# Patient Record
Sex: MALE | Race: WHITE | NOT HISPANIC OR LATINO | Employment: OTHER | URBAN - METROPOLITAN AREA
[De-identification: names, ages, dates, MRNs, and addresses within clinical notes are randomized per-mention and may not be internally consistent; named-entity substitution may affect disease eponyms.]

---

## 2017-01-19 ENCOUNTER — ALLSCRIPTS OFFICE VISIT (OUTPATIENT)
Dept: OTHER | Facility: OTHER | Age: 72
End: 2017-01-19

## 2017-03-03 ENCOUNTER — ALLSCRIPTS OFFICE VISIT (OUTPATIENT)
Dept: FAMILY MEDICINE CLINIC | Facility: CLINIC | Age: 72
End: 2017-03-03
Payer: COMMERCIAL

## 2017-03-03 ENCOUNTER — APPOINTMENT (OUTPATIENT)
Dept: LAB | Facility: HOSPITAL | Age: 72
End: 2017-03-03
Payer: COMMERCIAL

## 2017-03-03 DIAGNOSIS — E55.9 VITAMIN D DEFICIENCY: ICD-10-CM

## 2017-03-03 DIAGNOSIS — I51.9 HEART DISEASE: ICD-10-CM

## 2017-03-03 DIAGNOSIS — R94.6 ABNORMAL RESULTS OF THYROID FUNCTION STUDIES: ICD-10-CM

## 2017-03-03 DIAGNOSIS — R19.09 OTHER INTRA-ABDOMINAL AND PELVIC SWELLING, MASS AND LUMP: ICD-10-CM

## 2017-03-03 DIAGNOSIS — D64.9 ANEMIA: ICD-10-CM

## 2017-03-03 DIAGNOSIS — E53.8 DEFICIENCY OF OTHER SPECIFIED B GROUP VITAMINS: ICD-10-CM

## 2017-03-03 LAB
25(OH)D3 SERPL-MCNC: 5.8 NG/ML (ref 30–100)
ALBUMIN SERPL BCP-MCNC: 3 G/DL (ref 3.5–5)
ALP SERPL-CCNC: 93 U/L (ref 46–116)
ALT SERPL W P-5'-P-CCNC: 14 U/L (ref 12–78)
ANION GAP SERPL CALCULATED.3IONS-SCNC: 6 MMOL/L (ref 4–13)
AST SERPL W P-5'-P-CCNC: 12 U/L (ref 5–45)
BILIRUB SERPL-MCNC: 0.27 MG/DL (ref 0.2–1)
BUN SERPL-MCNC: 11 MG/DL (ref 5–25)
CALCIUM SERPL-MCNC: 9.1 MG/DL (ref 8.3–10.1)
CHLORIDE SERPL-SCNC: 103 MMOL/L (ref 100–108)
CHOLEST SERPL-MCNC: 182 MG/DL (ref 50–200)
CO2 SERPL-SCNC: 32 MMOL/L (ref 21–32)
CREAT SERPL-MCNC: 0.77 MG/DL (ref 0.6–1.3)
GFR SERPL CREATININE-BSD FRML MDRD: >60 ML/MIN/1.73SQ M
GLUCOSE SERPL-MCNC: 88 MG/DL (ref 65–140)
HDLC SERPL-MCNC: 36 MG/DL (ref 40–60)
LDLC SERPL CALC-MCNC: 107 MG/DL (ref 0–100)
POTASSIUM SERPL-SCNC: 4.2 MMOL/L (ref 3.5–5.3)
PROT SERPL-MCNC: 6.9 G/DL (ref 6.4–8.2)
SODIUM SERPL-SCNC: 141 MMOL/L (ref 136–145)
TRIGL SERPL-MCNC: 197 MG/DL
TSH SERPL DL<=0.05 MIU/L-ACNC: 0.93 UIU/ML (ref 0.36–3.74)
VIT B12 SERPL-MCNC: 407 PG/ML (ref 100–900)

## 2017-03-03 PROCEDURE — 82607 VITAMIN B-12: CPT

## 2017-03-03 PROCEDURE — 80061 LIPID PANEL: CPT

## 2017-03-03 PROCEDURE — 80053 COMPREHEN METABOLIC PANEL: CPT

## 2017-03-03 PROCEDURE — T1015 CLINIC SERVICE: HCPCS | Performed by: PHYSICIAN ASSISTANT

## 2017-03-03 PROCEDURE — 82306 VITAMIN D 25 HYDROXY: CPT

## 2017-03-03 PROCEDURE — 84443 ASSAY THYROID STIM HORMONE: CPT

## 2017-03-03 PROCEDURE — 36415 COLL VENOUS BLD VENIPUNCTURE: CPT

## 2017-03-20 ENCOUNTER — ALLSCRIPTS OFFICE VISIT (OUTPATIENT)
Dept: OTHER | Facility: OTHER | Age: 72
End: 2017-03-20

## 2017-05-08 ENCOUNTER — APPOINTMENT (INPATIENT)
Dept: RADIOLOGY | Facility: HOSPITAL | Age: 72
DRG: 189 | End: 2017-05-08
Payer: MEDICARE

## 2017-05-08 ENCOUNTER — HOSPITAL ENCOUNTER (INPATIENT)
Facility: HOSPITAL | Age: 72
LOS: 7 days | Discharge: HOME | DRG: 189 | End: 2017-05-15
Attending: INTERNAL MEDICINE | Admitting: INTERNAL MEDICINE
Payer: MEDICARE

## 2017-05-08 ENCOUNTER — ALLSCRIPTS OFFICE VISIT (OUTPATIENT)
Dept: OTHER | Facility: OTHER | Age: 72
End: 2017-05-08

## 2017-05-08 DIAGNOSIS — J44.1 CHRONIC OBSTRUCTIVE PULMONARY DISEASE WITH ACUTE EXACERBATION (HCC): Primary | ICD-10-CM

## 2017-05-08 LAB
ALBUMIN SERPL BCP-MCNC: 3.1 G/DL (ref 3.5–5)
ALP SERPL-CCNC: 84 U/L (ref 46–116)
ALT SERPL W P-5'-P-CCNC: 16 U/L (ref 12–78)
ANION GAP SERPL CALCULATED.3IONS-SCNC: 5 MMOL/L (ref 4–13)
ARTERIAL PATENCY WRIST A: YES
AST SERPL W P-5'-P-CCNC: 13 U/L (ref 5–45)
BASE EXCESS BLDA CALC-SCNC: 4.9 MMOL/L
BASOPHILS # BLD AUTO: 0.03 THOUSANDS/ΜL (ref 0–0.1)
BASOPHILS NFR BLD AUTO: 1 % (ref 0–1)
BILIRUB SERPL-MCNC: 0.6 MG/DL (ref 0.2–1)
BUN SERPL-MCNC: 11 MG/DL (ref 5–25)
CALCIUM SERPL-MCNC: 8.4 MG/DL (ref 8.3–10.1)
CHLORIDE SERPL-SCNC: 106 MMOL/L (ref 100–108)
CO2 SERPL-SCNC: 32 MMOL/L (ref 21–32)
CREAT SERPL-MCNC: 0.85 MG/DL (ref 0.6–1.3)
EOSINOPHIL # BLD AUTO: 0.24 THOUSAND/ΜL (ref 0–0.61)
EOSINOPHIL NFR BLD AUTO: 4 % (ref 0–6)
ERYTHROCYTE [DISTWIDTH] IN BLOOD BY AUTOMATED COUNT: 13.8 % (ref 11.6–15.1)
GFR SERPL CREATININE-BSD FRML MDRD: >60 ML/MIN/1.73SQ M
GLUCOSE SERPL-MCNC: 86 MG/DL (ref 65–140)
HCO3 BLDA-SCNC: 30.2 MMOL/L (ref 22–28)
HCT VFR BLD AUTO: 42.4 % (ref 36.5–49.3)
HGB BLD-MCNC: 13.6 G/DL (ref 12–17)
LYMPHOCYTES # BLD AUTO: 1.35 THOUSANDS/ΜL (ref 0.6–4.47)
LYMPHOCYTES NFR BLD AUTO: 20 % (ref 14–44)
MAGNESIUM SERPL-MCNC: 2 MG/DL (ref 1.6–2.6)
MCH RBC QN AUTO: 30.9 PG (ref 26.8–34.3)
MCHC RBC AUTO-ENTMCNC: 32.1 G/DL (ref 31.4–37.4)
MCV RBC AUTO: 96 FL (ref 82–98)
MONOCYTES # BLD AUTO: 0.67 THOUSAND/ΜL (ref 0.17–1.22)
MONOCYTES NFR BLD AUTO: 10 % (ref 4–12)
NEUTROPHILS # BLD AUTO: 4.34 THOUSANDS/ΜL (ref 1.85–7.62)
NEUTS SEG NFR BLD AUTO: 65 % (ref 43–75)
NON VENT ROOM AIR: ABNORMAL %
O2 CT BLDA-SCNC: 17.9 ML/DL (ref 16–23)
OXYHGB MFR BLDA: 90.4 % (ref 94–97)
PCO2 BLDA: 46.8 MM HG (ref 36–44)
PH BLDA: 7.43 [PH] (ref 7.35–7.45)
PHOSPHATE SERPL-MCNC: 3.1 MG/DL (ref 2.3–4.1)
PLATELET # BLD AUTO: 223 THOUSANDS/UL (ref 149–390)
PMV BLD AUTO: 9.7 FL (ref 8.9–12.7)
PO2 BLDA: 61.2 MM HG (ref 75–129)
POTASSIUM SERPL-SCNC: 4.1 MMOL/L (ref 3.5–5.3)
PROT SERPL-MCNC: 6.7 G/DL (ref 6.4–8.2)
RBC # BLD AUTO: 4.4 MILLION/UL (ref 3.88–5.62)
SODIUM SERPL-SCNC: 143 MMOL/L (ref 136–145)
SPECIMEN SOURCE: ABNORMAL
TROPONIN I SERPL-MCNC: <0.02 NG/ML
WBC # BLD AUTO: 6.63 THOUSAND/UL (ref 4.31–10.16)

## 2017-05-08 PROCEDURE — 82805 BLOOD GASES W/O2 SATURATION: CPT | Performed by: INTERNAL MEDICINE

## 2017-05-08 PROCEDURE — 85025 COMPLETE CBC W/AUTO DIFF WBC: CPT | Performed by: PHYSICIAN ASSISTANT

## 2017-05-08 PROCEDURE — 71010 HB CHEST X-RAY 1 VIEW FRONTAL (PORTABLE): CPT

## 2017-05-08 PROCEDURE — 84484 ASSAY OF TROPONIN QUANT: CPT | Performed by: PHYSICIAN ASSISTANT

## 2017-05-08 PROCEDURE — 94760 N-INVAS EAR/PLS OXIMETRY 1: CPT

## 2017-05-08 PROCEDURE — 36600 WITHDRAWAL OF ARTERIAL BLOOD: CPT

## 2017-05-08 PROCEDURE — 84100 ASSAY OF PHOSPHORUS: CPT | Performed by: INTERNAL MEDICINE

## 2017-05-08 PROCEDURE — 94640 AIRWAY INHALATION TREATMENT: CPT

## 2017-05-08 PROCEDURE — 94664 DEMO&/EVAL PT USE INHALER: CPT

## 2017-05-08 PROCEDURE — 87798 DETECT AGENT NOS DNA AMP: CPT | Performed by: PHYSICIAN ASSISTANT

## 2017-05-08 PROCEDURE — 93005 ELECTROCARDIOGRAM TRACING: CPT | Performed by: PHYSICIAN ASSISTANT

## 2017-05-08 PROCEDURE — 80053 COMPREHEN METABOLIC PANEL: CPT | Performed by: PHYSICIAN ASSISTANT

## 2017-05-08 PROCEDURE — 83735 ASSAY OF MAGNESIUM: CPT | Performed by: PHYSICIAN ASSISTANT

## 2017-05-08 RX ORDER — GUAIFENESIN/DEXTROMETHORPHAN 100-10MG/5
10 SYRUP ORAL EVERY 4 HOURS PRN
Status: DISCONTINUED | OUTPATIENT
Start: 2017-05-08 | End: 2017-05-15 | Stop reason: HOSPADM

## 2017-05-08 RX ORDER — NICOTINE 21 MG/24HR
1 PATCH, TRANSDERMAL 24 HOURS TRANSDERMAL DAILY
Status: DISCONTINUED | OUTPATIENT
Start: 2017-05-08 | End: 2017-05-08

## 2017-05-08 RX ORDER — LEVALBUTEROL 1.25 MG/.5ML
1.25 SOLUTION, CONCENTRATE RESPIRATORY (INHALATION) EVERY 6 HOURS PRN
Status: DISCONTINUED | OUTPATIENT
Start: 2017-05-08 | End: 2017-05-15 | Stop reason: HOSPADM

## 2017-05-08 RX ORDER — METHYLPREDNISOLONE SODIUM SUCCINATE 40 MG/ML
40 INJECTION, POWDER, LYOPHILIZED, FOR SOLUTION INTRAMUSCULAR; INTRAVENOUS EVERY 8 HOURS
Status: DISCONTINUED | OUTPATIENT
Start: 2017-05-08 | End: 2017-05-11

## 2017-05-08 RX ORDER — NICOTINE 21 MG/24HR
21 PATCH, TRANSDERMAL 24 HOURS TRANSDERMAL DAILY
Status: DISCONTINUED | OUTPATIENT
Start: 2017-05-08 | End: 2017-05-15 | Stop reason: HOSPADM

## 2017-05-08 RX ORDER — LEVALBUTEROL 1.25 MG/.5ML
1.25 SOLUTION, CONCENTRATE RESPIRATORY (INHALATION)
Status: DISCONTINUED | OUTPATIENT
Start: 2017-05-08 | End: 2017-05-15 | Stop reason: HOSPADM

## 2017-05-08 RX ORDER — SODIUM CHLORIDE FOR INHALATION 0.9 %
3 VIAL, NEBULIZER (ML) INHALATION EVERY 6 HOURS PRN
Status: DISCONTINUED | OUTPATIENT
Start: 2017-05-08 | End: 2017-05-15 | Stop reason: HOSPADM

## 2017-05-08 RX ORDER — ATORVASTATIN CALCIUM 40 MG/1
80 TABLET, FILM COATED ORAL
Status: DISCONTINUED | OUTPATIENT
Start: 2017-05-08 | End: 2017-05-15 | Stop reason: HOSPADM

## 2017-05-08 RX ORDER — ASPIRIN 81 MG/1
81 TABLET, CHEWABLE ORAL DAILY
Status: DISCONTINUED | OUTPATIENT
Start: 2017-05-08 | End: 2017-05-15 | Stop reason: HOSPADM

## 2017-05-08 RX ORDER — FERROUS SULFATE 325(65) MG
325 TABLET ORAL
Status: DISCONTINUED | OUTPATIENT
Start: 2017-05-09 | End: 2017-05-15 | Stop reason: HOSPADM

## 2017-05-08 RX ADMIN — ASPIRIN 81 MG: 81 TABLET, CHEWABLE ORAL at 19:03

## 2017-05-08 RX ADMIN — ATORVASTATIN CALCIUM 80 MG: 40 TABLET, FILM COATED ORAL at 19:03

## 2017-05-08 RX ADMIN — THEOPHYLLINE ANHYDROUS 400 MG: 400 CAPSULE, EXTENDED RELEASE ORAL at 19:03

## 2017-05-08 RX ADMIN — LEVALBUTEROL 1.25 MG: 1.25 SOLUTION, CONCENTRATE RESPIRATORY (INHALATION) at 19:44

## 2017-05-08 RX ADMIN — IPRATROPIUM BROMIDE 0.5 MG: 0.5 SOLUTION RESPIRATORY (INHALATION) at 19:44

## 2017-05-08 RX ADMIN — NICOTINE 21 MG: 21 PATCH, EXTENDED RELEASE TRANSDERMAL at 19:03

## 2017-05-08 RX ADMIN — ENOXAPARIN SODIUM 40 MG: 40 INJECTION SUBCUTANEOUS at 19:02

## 2017-05-08 RX ADMIN — METHYLPREDNISOLONE SODIUM SUCCINATE 40 MG: 40 INJECTION, POWDER, FOR SOLUTION INTRAMUSCULAR; INTRAVENOUS at 19:03

## 2017-05-09 ENCOUNTER — APPOINTMENT (INPATIENT)
Dept: OCCUPATIONAL THERAPY | Facility: HOSPITAL | Age: 72
DRG: 189 | End: 2017-05-09
Payer: MEDICARE

## 2017-05-09 LAB
25(OH)D3 SERPL-MCNC: 8.8 NG/ML (ref 30–100)
ANION GAP SERPL CALCULATED.3IONS-SCNC: 8 MMOL/L (ref 4–13)
ATRIAL RATE: 89 BPM
BUN SERPL-MCNC: 14 MG/DL (ref 5–25)
CALCIUM SERPL-MCNC: 8.8 MG/DL (ref 8.3–10.1)
CHLORIDE SERPL-SCNC: 105 MMOL/L (ref 100–108)
CO2 SERPL-SCNC: 28 MMOL/L (ref 21–32)
CREAT SERPL-MCNC: 0.84 MG/DL (ref 0.6–1.3)
ERYTHROCYTE [DISTWIDTH] IN BLOOD BY AUTOMATED COUNT: 13.3 % (ref 11.6–15.1)
FLUAV AG SPEC QL: NORMAL
FLUBV AG SPEC QL: NORMAL
GFR SERPL CREATININE-BSD FRML MDRD: >60 ML/MIN/1.73SQ M
GLUCOSE SERPL-MCNC: 144 MG/DL (ref 65–140)
HCT VFR BLD AUTO: 44 % (ref 36.5–49.3)
HGB BLD-MCNC: 14.3 G/DL (ref 12–17)
MCH RBC QN AUTO: 30.8 PG (ref 26.8–34.3)
MCHC RBC AUTO-ENTMCNC: 32.5 G/DL (ref 31.4–37.4)
MCV RBC AUTO: 95 FL (ref 82–98)
P AXIS: 81 DEGREES
PLATELET # BLD AUTO: 224 THOUSANDS/UL (ref 149–390)
PMV BLD AUTO: 9.9 FL (ref 8.9–12.7)
POTASSIUM SERPL-SCNC: 4.1 MMOL/L (ref 3.5–5.3)
PR INTERVAL: 168 MS
QRS AXIS: -20 DEGREES
QRSD INTERVAL: 76 MS
QT INTERVAL: 356 MS
QTC INTERVAL: 433 MS
RBC # BLD AUTO: 4.64 MILLION/UL (ref 3.88–5.62)
RSV B RNA SPEC QL NAA+PROBE: NORMAL
SODIUM SERPL-SCNC: 141 MMOL/L (ref 136–145)
T WAVE AXIS: 47 DEGREES
THEOPHYLLINE SERPL-MCNC: 4.9 UG/ML (ref 10–20)
VENTRICULAR RATE: 89 BPM
WBC # BLD AUTO: 4.27 THOUSAND/UL (ref 4.31–10.16)

## 2017-05-09 PROCEDURE — 94640 AIRWAY INHALATION TREATMENT: CPT

## 2017-05-09 PROCEDURE — G8988 SELF CARE GOAL STATUS: HCPCS

## 2017-05-09 PROCEDURE — G8987 SELF CARE CURRENT STATUS: HCPCS

## 2017-05-09 PROCEDURE — 82306 VITAMIN D 25 HYDROXY: CPT | Performed by: PHYSICIAN ASSISTANT

## 2017-05-09 PROCEDURE — 85027 COMPLETE CBC AUTOMATED: CPT | Performed by: PHYSICIAN ASSISTANT

## 2017-05-09 PROCEDURE — 80048 BASIC METABOLIC PNL TOTAL CA: CPT | Performed by: PHYSICIAN ASSISTANT

## 2017-05-09 PROCEDURE — 94760 N-INVAS EAR/PLS OXIMETRY 1: CPT

## 2017-05-09 PROCEDURE — 80198 ASSAY OF THEOPHYLLINE: CPT | Performed by: INTERNAL MEDICINE

## 2017-05-09 PROCEDURE — 97167 OT EVAL HIGH COMPLEX 60 MIN: CPT

## 2017-05-09 PROCEDURE — 97535 SELF CARE MNGMENT TRAINING: CPT

## 2017-05-09 RX ADMIN — METHYLPREDNISOLONE SODIUM SUCCINATE 40 MG: 40 INJECTION, POWDER, FOR SOLUTION INTRAMUSCULAR; INTRAVENOUS at 18:28

## 2017-05-09 RX ADMIN — GUAIFENESIN AND DEXTROMETHORPHAN 10 ML: 100; 10 SYRUP ORAL at 21:00

## 2017-05-09 RX ADMIN — LEVALBUTEROL 1.25 MG: 1.25 SOLUTION, CONCENTRATE RESPIRATORY (INHALATION) at 19:58

## 2017-05-09 RX ADMIN — IPRATROPIUM BROMIDE 0.5 MG: 0.5 SOLUTION RESPIRATORY (INHALATION) at 08:14

## 2017-05-09 RX ADMIN — ASPIRIN 81 MG: 81 TABLET, CHEWABLE ORAL at 09:42

## 2017-05-09 RX ADMIN — LEVALBUTEROL 1.25 MG: 1.25 SOLUTION, CONCENTRATE RESPIRATORY (INHALATION) at 13:11

## 2017-05-09 RX ADMIN — IPRATROPIUM BROMIDE 0.5 MG: 0.5 SOLUTION RESPIRATORY (INHALATION) at 19:58

## 2017-05-09 RX ADMIN — ENOXAPARIN SODIUM 40 MG: 40 INJECTION SUBCUTANEOUS at 18:28

## 2017-05-09 RX ADMIN — IPRATROPIUM BROMIDE 0.5 MG: 0.5 SOLUTION RESPIRATORY (INHALATION) at 13:11

## 2017-05-09 RX ADMIN — NICOTINE 21 MG: 21 PATCH, EXTENDED RELEASE TRANSDERMAL at 09:43

## 2017-05-09 RX ADMIN — ISODIUM CHLORIDE 3 ML: 0.03 SOLUTION RESPIRATORY (INHALATION) at 00:20

## 2017-05-09 RX ADMIN — LEVALBUTEROL 1.25 MG: 1.25 SOLUTION, CONCENTRATE RESPIRATORY (INHALATION) at 00:19

## 2017-05-09 RX ADMIN — METHYLPREDNISOLONE SODIUM SUCCINATE 40 MG: 40 INJECTION, POWDER, FOR SOLUTION INTRAMUSCULAR; INTRAVENOUS at 09:48

## 2017-05-09 RX ADMIN — THEOPHYLLINE ANHYDROUS 400 MG: 400 CAPSULE, EXTENDED RELEASE ORAL at 09:42

## 2017-05-09 RX ADMIN — Medication 325 MG: at 09:42

## 2017-05-09 RX ADMIN — METHYLPREDNISOLONE SODIUM SUCCINATE 40 MG: 40 INJECTION, POWDER, FOR SOLUTION INTRAMUSCULAR; INTRAVENOUS at 02:52

## 2017-05-09 RX ADMIN — LEVALBUTEROL 1.25 MG: 1.25 SOLUTION, CONCENTRATE RESPIRATORY (INHALATION) at 08:14

## 2017-05-09 RX ADMIN — ATORVASTATIN CALCIUM 80 MG: 40 TABLET, FILM COATED ORAL at 18:28

## 2017-05-10 ENCOUNTER — APPOINTMENT (INPATIENT)
Dept: PHYSICAL THERAPY | Facility: HOSPITAL | Age: 72
DRG: 189 | End: 2017-05-10
Payer: MEDICARE

## 2017-05-10 ENCOUNTER — APPOINTMENT (INPATIENT)
Dept: OCCUPATIONAL THERAPY | Facility: HOSPITAL | Age: 72
DRG: 189 | End: 2017-05-10
Payer: MEDICARE

## 2017-05-10 PROCEDURE — 97535 SELF CARE MNGMENT TRAINING: CPT

## 2017-05-10 PROCEDURE — G8979 MOBILITY GOAL STATUS: HCPCS | Performed by: PHYSICAL THERAPIST

## 2017-05-10 PROCEDURE — G8978 MOBILITY CURRENT STATUS: HCPCS | Performed by: PHYSICAL THERAPIST

## 2017-05-10 PROCEDURE — 94640 AIRWAY INHALATION TREATMENT: CPT

## 2017-05-10 PROCEDURE — 94760 N-INVAS EAR/PLS OXIMETRY 1: CPT

## 2017-05-10 PROCEDURE — 97163 PT EVAL HIGH COMPLEX 45 MIN: CPT | Performed by: PHYSICAL THERAPIST

## 2017-05-10 RX ORDER — ONDANSETRON 2 MG/ML
4 INJECTION INTRAMUSCULAR; INTRAVENOUS EVERY 4 HOURS PRN
Status: DISCONTINUED | OUTPATIENT
Start: 2017-05-10 | End: 2017-05-15 | Stop reason: HOSPADM

## 2017-05-10 RX ORDER — BENZONATATE 100 MG/1
100 CAPSULE ORAL 3 TIMES DAILY PRN
Status: DISCONTINUED | OUTPATIENT
Start: 2017-05-10 | End: 2017-05-15 | Stop reason: HOSPADM

## 2017-05-10 RX ORDER — SIMETHICONE 80 MG
80 TABLET,CHEWABLE ORAL EVERY 6 HOURS PRN
Status: DISCONTINUED | OUTPATIENT
Start: 2017-05-10 | End: 2017-05-11

## 2017-05-10 RX ADMIN — Medication 325 MG: at 08:11

## 2017-05-10 RX ADMIN — ENOXAPARIN SODIUM 40 MG: 40 INJECTION SUBCUTANEOUS at 18:08

## 2017-05-10 RX ADMIN — LEVALBUTEROL 1.25 MG: 1.25 SOLUTION, CONCENTRATE RESPIRATORY (INHALATION) at 13:44

## 2017-05-10 RX ADMIN — ISODIUM CHLORIDE 3 ML: 0.03 SOLUTION RESPIRATORY (INHALATION) at 04:28

## 2017-05-10 RX ADMIN — METHYLPREDNISOLONE SODIUM SUCCINATE 40 MG: 40 INJECTION, POWDER, FOR SOLUTION INTRAMUSCULAR; INTRAVENOUS at 18:09

## 2017-05-10 RX ADMIN — BENZONATATE 100 MG: 100 CAPSULE ORAL at 05:53

## 2017-05-10 RX ADMIN — NICOTINE 21 MG: 21 PATCH, EXTENDED RELEASE TRANSDERMAL at 09:53

## 2017-05-10 RX ADMIN — IPRATROPIUM BROMIDE 0.5 MG: 0.5 SOLUTION RESPIRATORY (INHALATION) at 20:50

## 2017-05-10 RX ADMIN — LEVALBUTEROL 1.25 MG: 1.25 SOLUTION, CONCENTRATE RESPIRATORY (INHALATION) at 08:12

## 2017-05-10 RX ADMIN — THEOPHYLLINE ANHYDROUS 400 MG: 400 CAPSULE, EXTENDED RELEASE ORAL at 09:52

## 2017-05-10 RX ADMIN — ATORVASTATIN CALCIUM 80 MG: 40 TABLET, FILM COATED ORAL at 18:08

## 2017-05-10 RX ADMIN — SIMETHICONE CHEW TAB 80 MG 80 MG: 80 TABLET ORAL at 22:35

## 2017-05-10 RX ADMIN — BENZONATATE 100 MG: 100 CAPSULE ORAL at 18:23

## 2017-05-10 RX ADMIN — METHYLPREDNISOLONE SODIUM SUCCINATE 40 MG: 40 INJECTION, POWDER, FOR SOLUTION INTRAMUSCULAR; INTRAVENOUS at 02:22

## 2017-05-10 RX ADMIN — LEVALBUTEROL 1.25 MG: 1.25 SOLUTION, CONCENTRATE RESPIRATORY (INHALATION) at 20:50

## 2017-05-10 RX ADMIN — IPRATROPIUM BROMIDE 0.5 MG: 0.5 SOLUTION RESPIRATORY (INHALATION) at 08:12

## 2017-05-10 RX ADMIN — IPRATROPIUM BROMIDE 0.5 MG: 0.5 SOLUTION RESPIRATORY (INHALATION) at 13:44

## 2017-05-10 RX ADMIN — GUAIFENESIN AND DEXTROMETHORPHAN 10 ML: 100; 10 SYRUP ORAL at 10:06

## 2017-05-10 RX ADMIN — ASPIRIN 81 MG: 81 TABLET, CHEWABLE ORAL at 09:52

## 2017-05-10 RX ADMIN — GUAIFENESIN AND DEXTROMETHORPHAN 10 ML: 100; 10 SYRUP ORAL at 02:50

## 2017-05-10 RX ADMIN — LEVALBUTEROL 1.25 MG: 1.25 SOLUTION, CONCENTRATE RESPIRATORY (INHALATION) at 04:28

## 2017-05-10 RX ADMIN — ONDANSETRON 4 MG: 2 INJECTION INTRAMUSCULAR; INTRAVENOUS at 05:53

## 2017-05-10 RX ADMIN — METHYLPREDNISOLONE SODIUM SUCCINATE 40 MG: 40 INJECTION, POWDER, FOR SOLUTION INTRAMUSCULAR; INTRAVENOUS at 09:52

## 2017-05-11 ENCOUNTER — APPOINTMENT (INPATIENT)
Dept: OCCUPATIONAL THERAPY | Facility: HOSPITAL | Age: 72
DRG: 189 | End: 2017-05-11
Payer: MEDICARE

## 2017-05-11 PROCEDURE — 94760 N-INVAS EAR/PLS OXIMETRY 1: CPT

## 2017-05-11 PROCEDURE — 97110 THERAPEUTIC EXERCISES: CPT

## 2017-05-11 PROCEDURE — 94640 AIRWAY INHALATION TREATMENT: CPT

## 2017-05-11 RX ORDER — ERGOCALCIFEROL 1.25 MG/1
50000 CAPSULE ORAL WEEKLY
Status: DISCONTINUED | OUTPATIENT
Start: 2017-05-11 | End: 2017-05-15 | Stop reason: HOSPADM

## 2017-05-11 RX ORDER — METHYLPREDNISOLONE SODIUM SUCCINATE 40 MG/ML
40 INJECTION, POWDER, LYOPHILIZED, FOR SOLUTION INTRAMUSCULAR; INTRAVENOUS EVERY 12 HOURS SCHEDULED
Status: DISCONTINUED | OUTPATIENT
Start: 2017-05-11 | End: 2017-05-13

## 2017-05-11 RX ORDER — FAMOTIDINE 20 MG/1
20 TABLET, FILM COATED ORAL 2 TIMES DAILY
Status: DISCONTINUED | OUTPATIENT
Start: 2017-05-11 | End: 2017-05-12

## 2017-05-11 RX ORDER — MAGNESIUM HYDROXIDE/ALUMINUM HYDROXICE/SIMETHICONE 120; 1200; 1200 MG/30ML; MG/30ML; MG/30ML
30 SUSPENSION ORAL EVERY 4 HOURS PRN
Status: DISCONTINUED | OUTPATIENT
Start: 2017-05-11 | End: 2017-05-15 | Stop reason: HOSPADM

## 2017-05-11 RX ADMIN — METHYLPREDNISOLONE SODIUM SUCCINATE 40 MG: 40 INJECTION, POWDER, FOR SOLUTION INTRAMUSCULAR; INTRAVENOUS at 09:52

## 2017-05-11 RX ADMIN — THEOPHYLLINE ANHYDROUS 400 MG: 400 CAPSULE, EXTENDED RELEASE ORAL at 14:29

## 2017-05-11 RX ADMIN — METHYLPREDNISOLONE SODIUM SUCCINATE 40 MG: 40 INJECTION, POWDER, FOR SOLUTION INTRAMUSCULAR; INTRAVENOUS at 20:56

## 2017-05-11 RX ADMIN — ONDANSETRON 4 MG: 2 INJECTION INTRAMUSCULAR; INTRAVENOUS at 09:53

## 2017-05-11 RX ADMIN — LEVALBUTEROL 1.25 MG: 1.25 SOLUTION, CONCENTRATE RESPIRATORY (INHALATION) at 14:05

## 2017-05-11 RX ADMIN — FAMOTIDINE 20 MG: 20 TABLET ORAL at 09:52

## 2017-05-11 RX ADMIN — FAMOTIDINE 20 MG: 20 TABLET ORAL at 18:46

## 2017-05-11 RX ADMIN — IPRATROPIUM BROMIDE 0.5 MG: 0.5 SOLUTION RESPIRATORY (INHALATION) at 14:04

## 2017-05-11 RX ADMIN — LEVALBUTEROL 1.25 MG: 1.25 SOLUTION, CONCENTRATE RESPIRATORY (INHALATION) at 21:06

## 2017-05-11 RX ADMIN — ASPIRIN 81 MG: 81 TABLET, CHEWABLE ORAL at 09:52

## 2017-05-11 RX ADMIN — NICOTINE 21 MG: 21 PATCH, EXTENDED RELEASE TRANSDERMAL at 09:53

## 2017-05-11 RX ADMIN — METHYLPREDNISOLONE SODIUM SUCCINATE 40 MG: 40 INJECTION, POWDER, FOR SOLUTION INTRAMUSCULAR; INTRAVENOUS at 02:02

## 2017-05-11 RX ADMIN — SIMETHICONE CHEW TAB 80 MG 80 MG: 80 TABLET ORAL at 02:02

## 2017-05-11 RX ADMIN — ATORVASTATIN CALCIUM 80 MG: 40 TABLET, FILM COATED ORAL at 18:46

## 2017-05-11 RX ADMIN — LEVALBUTEROL 1.25 MG: 1.25 SOLUTION, CONCENTRATE RESPIRATORY (INHALATION) at 01:23

## 2017-05-11 RX ADMIN — LEVALBUTEROL 1.25 MG: 1.25 SOLUTION, CONCENTRATE RESPIRATORY (INHALATION) at 07:35

## 2017-05-11 RX ADMIN — IPRATROPIUM BROMIDE 0.5 MG: 0.5 SOLUTION RESPIRATORY (INHALATION) at 21:06

## 2017-05-11 RX ADMIN — Medication 325 MG: at 09:52

## 2017-05-11 RX ADMIN — GUAIFENESIN AND DEXTROMETHORPHAN 10 ML: 100; 10 SYRUP ORAL at 02:02

## 2017-05-11 RX ADMIN — IPRATROPIUM BROMIDE 0.5 MG: 0.5 SOLUTION RESPIRATORY (INHALATION) at 07:35

## 2017-05-11 RX ADMIN — ERGOCALCIFEROL 50000 UNITS: 1.25 CAPSULE ORAL at 09:52

## 2017-05-11 RX ADMIN — ISODIUM CHLORIDE 3 ML: 0.03 SOLUTION RESPIRATORY (INHALATION) at 01:22

## 2017-05-11 RX ADMIN — ENOXAPARIN SODIUM 40 MG: 40 INJECTION SUBCUTANEOUS at 18:46

## 2017-05-11 RX ADMIN — BENZONATATE 100 MG: 100 CAPSULE ORAL at 02:02

## 2017-05-12 ENCOUNTER — APPOINTMENT (INPATIENT)
Dept: OCCUPATIONAL THERAPY | Facility: HOSPITAL | Age: 72
DRG: 189 | End: 2017-05-12
Payer: MEDICARE

## 2017-05-12 ENCOUNTER — APPOINTMENT (INPATIENT)
Dept: PHYSICAL THERAPY | Facility: HOSPITAL | Age: 72
DRG: 189 | End: 2017-05-12
Payer: MEDICARE

## 2017-05-12 PROCEDURE — 94760 N-INVAS EAR/PLS OXIMETRY 1: CPT

## 2017-05-12 PROCEDURE — 94640 AIRWAY INHALATION TREATMENT: CPT

## 2017-05-12 PROCEDURE — 97116 GAIT TRAINING THERAPY: CPT

## 2017-05-12 PROCEDURE — 97530 THERAPEUTIC ACTIVITIES: CPT

## 2017-05-12 PROCEDURE — 97110 THERAPEUTIC EXERCISES: CPT

## 2017-05-12 RX ORDER — PANTOPRAZOLE SODIUM 40 MG/1
40 TABLET, DELAYED RELEASE ORAL
Status: DISCONTINUED | OUTPATIENT
Start: 2017-05-12 | End: 2017-05-15 | Stop reason: HOSPADM

## 2017-05-12 RX ADMIN — METHYLPREDNISOLONE SODIUM SUCCINATE 40 MG: 40 INJECTION, POWDER, FOR SOLUTION INTRAMUSCULAR; INTRAVENOUS at 20:01

## 2017-05-12 RX ADMIN — IPRATROPIUM BROMIDE 0.5 MG: 0.5 SOLUTION RESPIRATORY (INHALATION) at 14:11

## 2017-05-12 RX ADMIN — METHYLPREDNISOLONE SODIUM SUCCINATE 40 MG: 40 INJECTION, POWDER, FOR SOLUTION INTRAMUSCULAR; INTRAVENOUS at 08:22

## 2017-05-12 RX ADMIN — THEOPHYLLINE ANHYDROUS 400 MG: 400 CAPSULE, EXTENDED RELEASE ORAL at 08:22

## 2017-05-12 RX ADMIN — ATORVASTATIN CALCIUM 80 MG: 40 TABLET, FILM COATED ORAL at 15:53

## 2017-05-12 RX ADMIN — GUAIFENESIN AND DEXTROMETHORPHAN 10 ML: 100; 10 SYRUP ORAL at 08:27

## 2017-05-12 RX ADMIN — NICOTINE 21 MG: 21 PATCH, EXTENDED RELEASE TRANSDERMAL at 08:22

## 2017-05-12 RX ADMIN — Medication 325 MG: at 07:57

## 2017-05-12 RX ADMIN — LEVALBUTEROL 1.25 MG: 1.25 SOLUTION, CONCENTRATE RESPIRATORY (INHALATION) at 14:12

## 2017-05-12 RX ADMIN — LEVALBUTEROL 1.25 MG: 1.25 SOLUTION, CONCENTRATE RESPIRATORY (INHALATION) at 20:34

## 2017-05-12 RX ADMIN — IPRATROPIUM BROMIDE 0.5 MG: 0.5 SOLUTION RESPIRATORY (INHALATION) at 20:34

## 2017-05-12 RX ADMIN — ALUMINUM HYDROXIDE, MAGNESIUM HYDROXIDE, AND SIMETHICONE 30 ML: 200; 200; 20 SUSPENSION ORAL at 20:02

## 2017-05-12 RX ADMIN — ENOXAPARIN SODIUM 40 MG: 40 INJECTION SUBCUTANEOUS at 18:38

## 2017-05-12 RX ADMIN — PANTOPRAZOLE SODIUM 40 MG: 40 TABLET, DELAYED RELEASE ORAL at 15:52

## 2017-05-12 RX ADMIN — FAMOTIDINE 20 MG: 20 TABLET ORAL at 08:22

## 2017-05-12 RX ADMIN — ASPIRIN 81 MG: 81 TABLET, CHEWABLE ORAL at 08:22

## 2017-05-13 PROCEDURE — 87493 C DIFF AMPLIFIED PROBE: CPT | Performed by: INTERNAL MEDICINE

## 2017-05-13 PROCEDURE — 94640 AIRWAY INHALATION TREATMENT: CPT

## 2017-05-13 PROCEDURE — 94760 N-INVAS EAR/PLS OXIMETRY 1: CPT

## 2017-05-13 RX ORDER — METHYLPREDNISOLONE SODIUM SUCCINATE 40 MG/ML
40 INJECTION, POWDER, LYOPHILIZED, FOR SOLUTION INTRAMUSCULAR; INTRAVENOUS DAILY
Status: DISCONTINUED | OUTPATIENT
Start: 2017-05-14 | End: 2017-05-14

## 2017-05-13 RX ADMIN — LEVALBUTEROL 1.25 MG: 1.25 SOLUTION, CONCENTRATE RESPIRATORY (INHALATION) at 14:03

## 2017-05-13 RX ADMIN — THEOPHYLLINE ANHYDROUS 400 MG: 400 CAPSULE, EXTENDED RELEASE ORAL at 08:46

## 2017-05-13 RX ADMIN — LEVALBUTEROL 1.25 MG: 1.25 SOLUTION, CONCENTRATE RESPIRATORY (INHALATION) at 09:05

## 2017-05-13 RX ADMIN — NICOTINE 21 MG: 21 PATCH, EXTENDED RELEASE TRANSDERMAL at 08:46

## 2017-05-13 RX ADMIN — ASPIRIN 81 MG: 81 TABLET, CHEWABLE ORAL at 08:46

## 2017-05-13 RX ADMIN — Medication 325 MG: at 08:16

## 2017-05-13 RX ADMIN — LEVALBUTEROL 1.25 MG: 1.25 SOLUTION, CONCENTRATE RESPIRATORY (INHALATION) at 20:55

## 2017-05-13 RX ADMIN — BENZONATATE 100 MG: 100 CAPSULE ORAL at 17:08

## 2017-05-13 RX ADMIN — ALUMINUM HYDROXIDE, MAGNESIUM HYDROXIDE, AND SIMETHICONE 30 ML: 200; 200; 20 SUSPENSION ORAL at 17:14

## 2017-05-13 RX ADMIN — METHYLPREDNISOLONE SODIUM SUCCINATE 40 MG: 40 INJECTION, POWDER, FOR SOLUTION INTRAMUSCULAR; INTRAVENOUS at 08:46

## 2017-05-13 RX ADMIN — PANTOPRAZOLE SODIUM 40 MG: 40 TABLET, DELAYED RELEASE ORAL at 17:08

## 2017-05-13 RX ADMIN — IPRATROPIUM BROMIDE 0.5 MG: 0.5 SOLUTION RESPIRATORY (INHALATION) at 09:06

## 2017-05-13 RX ADMIN — IPRATROPIUM BROMIDE 0.5 MG: 0.5 SOLUTION RESPIRATORY (INHALATION) at 20:55

## 2017-05-13 RX ADMIN — PANTOPRAZOLE SODIUM 40 MG: 40 TABLET, DELAYED RELEASE ORAL at 06:22

## 2017-05-13 RX ADMIN — ATORVASTATIN CALCIUM 80 MG: 40 TABLET, FILM COATED ORAL at 17:08

## 2017-05-13 RX ADMIN — ENOXAPARIN SODIUM 40 MG: 40 INJECTION SUBCUTANEOUS at 17:08

## 2017-05-13 RX ADMIN — IPRATROPIUM BROMIDE 0.5 MG: 0.5 SOLUTION RESPIRATORY (INHALATION) at 14:03

## 2017-05-14 LAB
ANION GAP SERPL CALCULATED.3IONS-SCNC: 6 MMOL/L (ref 4–13)
BASOPHILS # BLD AUTO: 0 THOUSANDS/ΜL (ref 0–0.1)
BASOPHILS NFR BLD AUTO: 0 % (ref 0–1)
BUN SERPL-MCNC: 24 MG/DL (ref 5–25)
CALCIUM SERPL-MCNC: 8.4 MG/DL (ref 8.3–10.1)
CHLORIDE SERPL-SCNC: 101 MMOL/L (ref 100–108)
CO2 SERPL-SCNC: 33 MMOL/L (ref 21–32)
CREAT SERPL-MCNC: 0.88 MG/DL (ref 0.6–1.3)
EOSINOPHIL # BLD AUTO: 0.04 THOUSAND/ΜL (ref 0–0.61)
EOSINOPHIL NFR BLD AUTO: 0 % (ref 0–6)
ERYTHROCYTE [DISTWIDTH] IN BLOOD BY AUTOMATED COUNT: 13.5 % (ref 11.6–15.1)
GFR SERPL CREATININE-BSD FRML MDRD: >60 ML/MIN/1.73SQ M
GLUCOSE SERPL-MCNC: 89 MG/DL (ref 65–140)
HCT VFR BLD AUTO: 44.4 % (ref 36.5–49.3)
HGB BLD-MCNC: 14.4 G/DL (ref 12–17)
LYMPHOCYTES # BLD AUTO: 2.27 THOUSANDS/ΜL (ref 0.6–4.47)
LYMPHOCYTES NFR BLD AUTO: 21 % (ref 14–44)
MCH RBC QN AUTO: 30.7 PG (ref 26.8–34.3)
MCHC RBC AUTO-ENTMCNC: 32.4 G/DL (ref 31.4–37.4)
MCV RBC AUTO: 95 FL (ref 82–98)
MONOCYTES # BLD AUTO: 1.5 THOUSAND/ΜL (ref 0.17–1.22)
MONOCYTES NFR BLD AUTO: 14 % (ref 4–12)
NEUTROPHILS # BLD AUTO: 7.28 THOUSANDS/ΜL (ref 1.85–7.62)
NEUTS SEG NFR BLD AUTO: 65 % (ref 43–75)
PLATELET # BLD AUTO: 249 THOUSANDS/UL (ref 149–390)
PMV BLD AUTO: 10.4 FL (ref 8.9–12.7)
POTASSIUM SERPL-SCNC: 3.6 MMOL/L (ref 3.5–5.3)
RBC # BLD AUTO: 4.69 MILLION/UL (ref 3.88–5.62)
SODIUM SERPL-SCNC: 140 MMOL/L (ref 136–145)
WBC # BLD AUTO: 11.09 THOUSAND/UL (ref 4.31–10.16)

## 2017-05-14 PROCEDURE — 80048 BASIC METABOLIC PNL TOTAL CA: CPT | Performed by: FAMILY MEDICINE

## 2017-05-14 PROCEDURE — 85025 COMPLETE CBC W/AUTO DIFF WBC: CPT | Performed by: FAMILY MEDICINE

## 2017-05-14 PROCEDURE — 94640 AIRWAY INHALATION TREATMENT: CPT

## 2017-05-14 PROCEDURE — 94760 N-INVAS EAR/PLS OXIMETRY 1: CPT

## 2017-05-14 RX ORDER — PREDNISONE 20 MG/1
40 TABLET ORAL DAILY
Status: DISCONTINUED | OUTPATIENT
Start: 2017-05-15 | End: 2017-05-15 | Stop reason: HOSPADM

## 2017-05-14 RX ADMIN — ALUMINUM HYDROXIDE, MAGNESIUM HYDROXIDE, AND SIMETHICONE 30 ML: 200; 200; 20 SUSPENSION ORAL at 17:21

## 2017-05-14 RX ADMIN — ALUMINUM HYDROXIDE, MAGNESIUM HYDROXIDE, AND SIMETHICONE 30 ML: 200; 200; 20 SUSPENSION ORAL at 08:41

## 2017-05-14 RX ADMIN — LEVALBUTEROL 1.25 MG: 1.25 SOLUTION, CONCENTRATE RESPIRATORY (INHALATION) at 20:36

## 2017-05-14 RX ADMIN — LEVALBUTEROL 1.25 MG: 1.25 SOLUTION, CONCENTRATE RESPIRATORY (INHALATION) at 13:57

## 2017-05-14 RX ADMIN — METHYLPREDNISOLONE SODIUM SUCCINATE 40 MG: 40 INJECTION, POWDER, FOR SOLUTION INTRAMUSCULAR; INTRAVENOUS at 08:40

## 2017-05-14 RX ADMIN — BENZONATATE 100 MG: 100 CAPSULE ORAL at 17:21

## 2017-05-14 RX ADMIN — ENOXAPARIN SODIUM 40 MG: 40 INJECTION SUBCUTANEOUS at 17:16

## 2017-05-14 RX ADMIN — LEVALBUTEROL 1.25 MG: 1.25 SOLUTION, CONCENTRATE RESPIRATORY (INHALATION) at 08:00

## 2017-05-14 RX ADMIN — ATORVASTATIN CALCIUM 80 MG: 40 TABLET, FILM COATED ORAL at 17:16

## 2017-05-14 RX ADMIN — Medication 325 MG: at 08:40

## 2017-05-14 RX ADMIN — PANTOPRAZOLE SODIUM 40 MG: 40 TABLET, DELAYED RELEASE ORAL at 06:15

## 2017-05-14 RX ADMIN — IPRATROPIUM BROMIDE 0.5 MG: 0.5 SOLUTION RESPIRATORY (INHALATION) at 20:36

## 2017-05-14 RX ADMIN — NICOTINE 21 MG: 21 PATCH, EXTENDED RELEASE TRANSDERMAL at 08:49

## 2017-05-14 RX ADMIN — PANTOPRAZOLE SODIUM 40 MG: 40 TABLET, DELAYED RELEASE ORAL at 17:16

## 2017-05-14 RX ADMIN — THEOPHYLLINE ANHYDROUS 400 MG: 400 CAPSULE, EXTENDED RELEASE ORAL at 08:40

## 2017-05-14 RX ADMIN — IPRATROPIUM BROMIDE 0.5 MG: 0.5 SOLUTION RESPIRATORY (INHALATION) at 08:00

## 2017-05-14 RX ADMIN — IPRATROPIUM BROMIDE 0.5 MG: 0.5 SOLUTION RESPIRATORY (INHALATION) at 13:57

## 2017-05-14 RX ADMIN — ASPIRIN 81 MG: 81 TABLET, CHEWABLE ORAL at 08:40

## 2017-05-15 ENCOUNTER — APPOINTMENT (INPATIENT)
Dept: OCCUPATIONAL THERAPY | Facility: HOSPITAL | Age: 72
DRG: 189 | End: 2017-05-15
Payer: MEDICARE

## 2017-05-15 VITALS
RESPIRATION RATE: 18 BRPM | OXYGEN SATURATION: 91 % | DIASTOLIC BLOOD PRESSURE: 62 MMHG | SYSTOLIC BLOOD PRESSURE: 108 MMHG | HEIGHT: 64 IN | TEMPERATURE: 97.6 F | WEIGHT: 113.98 LBS | HEART RATE: 83 BPM | BODY MASS INDEX: 19.46 KG/M2

## 2017-05-15 LAB — C DIFF TOX GENS STL QL NAA+PROBE: NORMAL

## 2017-05-15 PROCEDURE — 94640 AIRWAY INHALATION TREATMENT: CPT

## 2017-05-15 PROCEDURE — 94760 N-INVAS EAR/PLS OXIMETRY 1: CPT

## 2017-05-15 RX ORDER — PANTOPRAZOLE SODIUM 40 MG/1
40 TABLET, DELAYED RELEASE ORAL
Qty: 60 TABLET | Refills: 0 | Status: ON HOLD | OUTPATIENT
Start: 2017-05-15 | End: 2017-11-07

## 2017-05-15 RX ORDER — PREDNISONE 20 MG/1
TABLET ORAL
Qty: 25 TABLET | Refills: 0 | Status: ON HOLD | OUTPATIENT
Start: 2017-05-15 | End: 2017-11-07

## 2017-05-15 RX ADMIN — IPRATROPIUM BROMIDE 0.5 MG: 0.5 SOLUTION RESPIRATORY (INHALATION) at 13:18

## 2017-05-15 RX ADMIN — PANTOPRAZOLE SODIUM 40 MG: 40 TABLET, DELAYED RELEASE ORAL at 06:12

## 2017-05-15 RX ADMIN — Medication 325 MG: at 09:13

## 2017-05-15 RX ADMIN — IPRATROPIUM BROMIDE 0.5 MG: 0.5 SOLUTION RESPIRATORY (INHALATION) at 07:37

## 2017-05-15 RX ADMIN — NICOTINE 21 MG: 21 PATCH, EXTENDED RELEASE TRANSDERMAL at 09:14

## 2017-05-15 RX ADMIN — LEVALBUTEROL 1.25 MG: 1.25 SOLUTION, CONCENTRATE RESPIRATORY (INHALATION) at 13:17

## 2017-05-15 RX ADMIN — GUAIFENESIN AND DEXTROMETHORPHAN 10 ML: 100; 10 SYRUP ORAL at 02:56

## 2017-05-15 RX ADMIN — LEVALBUTEROL 1.25 MG: 1.25 SOLUTION, CONCENTRATE RESPIRATORY (INHALATION) at 07:39

## 2017-05-15 RX ADMIN — ASPIRIN 81 MG: 81 TABLET, CHEWABLE ORAL at 09:13

## 2017-05-15 RX ADMIN — THEOPHYLLINE ANHYDROUS 400 MG: 400 CAPSULE, EXTENDED RELEASE ORAL at 09:13

## 2017-05-15 RX ADMIN — PREDNISONE 40 MG: 20 TABLET ORAL at 09:13

## 2017-05-16 ENCOUNTER — GENERIC CONVERSION - ENCOUNTER (OUTPATIENT)
Dept: OTHER | Facility: OTHER | Age: 72
End: 2017-05-16

## 2017-11-05 ENCOUNTER — HOSPITAL ENCOUNTER (INPATIENT)
Facility: HOSPITAL | Age: 72
LOS: 6 days | Discharge: HOME | DRG: 871 | End: 2017-11-11
Attending: EMERGENCY MEDICINE | Admitting: INTERNAL MEDICINE
Payer: MEDICARE

## 2017-11-05 ENCOUNTER — APPOINTMENT (EMERGENCY)
Dept: RADIOLOGY | Facility: HOSPITAL | Age: 72
DRG: 871 | End: 2017-11-05
Payer: MEDICARE

## 2017-11-05 DIAGNOSIS — R06.03 ACUTE RESPIRATORY DISTRESS: Primary | ICD-10-CM

## 2017-11-05 DIAGNOSIS — J44.1 COPD WITH ACUTE EXACERBATION (HCC): ICD-10-CM

## 2017-11-05 DIAGNOSIS — J20.9 ACUTE TRACHEOBRONCHITIS: ICD-10-CM

## 2017-11-05 LAB
ALBUMIN SERPL BCP-MCNC: 3.2 G/DL (ref 3.5–5)
ALP SERPL-CCNC: 83 U/L (ref 46–116)
ALT SERPL W P-5'-P-CCNC: 19 U/L (ref 12–78)
ANION GAP SERPL CALCULATED.3IONS-SCNC: 9 MMOL/L (ref 4–13)
APTT PPP: 43 SECONDS (ref 23–35)
ARTERIAL PATENCY WRIST A: YES
AST SERPL W P-5'-P-CCNC: 19 U/L (ref 5–45)
BASE EXCESS BLDA CALC-SCNC: 4.9 MMOL/L
BASOPHILS # BLD AUTO: 0.03 THOUSANDS/ΜL (ref 0–0.1)
BASOPHILS NFR BLD AUTO: 0 % (ref 0–1)
BILIRUB SERPL-MCNC: 1.2 MG/DL (ref 0.2–1)
BUN SERPL-MCNC: 15 MG/DL (ref 5–25)
CALCIUM SERPL-MCNC: 9.5 MG/DL (ref 8.3–10.1)
CHLORIDE SERPL-SCNC: 97 MMOL/L (ref 100–108)
CO2 SERPL-SCNC: 30 MMOL/L (ref 21–32)
CREAT SERPL-MCNC: 0.97 MG/DL (ref 0.6–1.3)
EOSINOPHIL # BLD AUTO: 0 THOUSAND/ΜL (ref 0–0.61)
EOSINOPHIL NFR BLD AUTO: 0 % (ref 0–6)
ERYTHROCYTE [DISTWIDTH] IN BLOOD BY AUTOMATED COUNT: 13 % (ref 11.6–15.1)
GFR SERPL CREATININE-BSD FRML MDRD: 77 ML/MIN/1.73SQ M
GLUCOSE SERPL-MCNC: 120 MG/DL (ref 65–140)
HCO3 BLDA-SCNC: 34.8 MMOL/L (ref 22–28)
HCT VFR BLD AUTO: 46 % (ref 36.5–49.3)
HGB BLD-MCNC: 14.9 G/DL (ref 12–17)
HOLD SPECIMEN: NO
INR PPP: 1.22 (ref 0.86–1.16)
LACTATE SERPL-SCNC: 2 MMOL/L (ref 0.5–2)
LYMPHOCYTES # BLD AUTO: 0.78 THOUSANDS/ΜL (ref 0.6–4.47)
LYMPHOCYTES NFR BLD AUTO: 4 % (ref 14–44)
MCH RBC QN AUTO: 30 PG (ref 26.8–34.3)
MCHC RBC AUTO-ENTMCNC: 32.4 G/DL (ref 31.4–37.4)
MCV RBC AUTO: 93 FL (ref 82–98)
MONOCYTES # BLD AUTO: 2.22 THOUSAND/ΜL (ref 0.17–1.22)
MONOCYTES NFR BLD AUTO: 11 % (ref 4–12)
NASAL CANNULA: 3
NEUTROPHILS # BLD AUTO: 17.18 THOUSANDS/ΜL (ref 1.85–7.62)
NEUTS SEG NFR BLD AUTO: 85 % (ref 43–75)
NT-PROBNP SERPL-MCNC: 621 PG/ML
O2 CT BLDA-SCNC: 7.5 ML/DL (ref 16–23)
OXYHGB MFR BLDA: 38.9 % (ref 94–97)
PCO2 BLDA: 65.4 MM HG (ref 36–44)
PH BLDA: 7.36 [PH] (ref 7.35–7.45)
PLATELET # BLD AUTO: 274 THOUSANDS/UL (ref 149–390)
PMV BLD AUTO: 9.9 FL (ref 8.9–12.7)
PO2 BLDA: 19.2 MM HG (ref 75–129)
POTASSIUM SERPL-SCNC: 3.5 MMOL/L (ref 3.5–5.3)
PROT SERPL-MCNC: 8.4 G/DL (ref 6.4–8.2)
PROTHROMBIN TIME: 15.3 SECONDS (ref 12.1–14.4)
RBC # BLD AUTO: 4.97 MILLION/UL (ref 3.88–5.62)
SODIUM SERPL-SCNC: 136 MMOL/L (ref 136–145)
SPECIMEN SOURCE: ABNORMAL
WBC # BLD AUTO: 20.21 THOUSAND/UL (ref 4.31–10.16)

## 2017-11-05 PROCEDURE — 85730 THROMBOPLASTIN TIME PARTIAL: CPT | Performed by: EMERGENCY MEDICINE

## 2017-11-05 PROCEDURE — 85025 COMPLETE CBC W/AUTO DIFF WBC: CPT | Performed by: EMERGENCY MEDICINE

## 2017-11-05 PROCEDURE — 84484 ASSAY OF TROPONIN QUANT: CPT | Performed by: EMERGENCY MEDICINE

## 2017-11-05 PROCEDURE — 83880 ASSAY OF NATRIURETIC PEPTIDE: CPT | Performed by: EMERGENCY MEDICINE

## 2017-11-05 PROCEDURE — 71010 HB CHEST X-RAY 1 VIEW FRONTAL (PORTABLE): CPT

## 2017-11-05 PROCEDURE — 36415 COLL VENOUS BLD VENIPUNCTURE: CPT

## 2017-11-05 PROCEDURE — 80053 COMPREHEN METABOLIC PANEL: CPT | Performed by: EMERGENCY MEDICINE

## 2017-11-05 PROCEDURE — 80198 ASSAY OF THEOPHYLLINE: CPT | Performed by: EMERGENCY MEDICINE

## 2017-11-05 PROCEDURE — 82805 BLOOD GASES W/O2 SATURATION: CPT | Performed by: EMERGENCY MEDICINE

## 2017-11-05 PROCEDURE — 93005 ELECTROCARDIOGRAM TRACING: CPT | Performed by: EMERGENCY MEDICINE

## 2017-11-05 PROCEDURE — 87040 BLOOD CULTURE FOR BACTERIA: CPT | Performed by: EMERGENCY MEDICINE

## 2017-11-05 PROCEDURE — 85610 PROTHROMBIN TIME: CPT | Performed by: EMERGENCY MEDICINE

## 2017-11-05 PROCEDURE — 83605 ASSAY OF LACTIC ACID: CPT | Performed by: EMERGENCY MEDICINE

## 2017-11-05 PROCEDURE — 36600 WITHDRAWAL OF ARTERIAL BLOOD: CPT

## 2017-11-05 PROCEDURE — 96365 THER/PROPH/DIAG IV INF INIT: CPT

## 2017-11-05 RX ORDER — METHYLPREDNISOLONE SODIUM SUCCINATE 125 MG/2ML
125 INJECTION, POWDER, LYOPHILIZED, FOR SOLUTION INTRAMUSCULAR; INTRAVENOUS ONCE
Status: COMPLETED | OUTPATIENT
Start: 2017-11-05 | End: 2017-11-05

## 2017-11-05 RX ORDER — LEVOFLOXACIN 5 MG/ML
750 INJECTION, SOLUTION INTRAVENOUS ONCE
Status: COMPLETED | OUTPATIENT
Start: 2017-11-05 | End: 2017-11-05

## 2017-11-05 RX ORDER — ALBUTEROL SULFATE 2.5 MG/3ML
10 SOLUTION RESPIRATORY (INHALATION) ONCE
Status: COMPLETED | OUTPATIENT
Start: 2017-11-05 | End: 2017-11-05

## 2017-11-05 RX ORDER — 0.9 % SODIUM CHLORIDE 0.9 %
3 VIAL (ML) INJECTION AS NEEDED
Status: DISCONTINUED | OUTPATIENT
Start: 2017-11-05 | End: 2017-11-11 | Stop reason: HOSPADM

## 2017-11-05 RX ADMIN — LEVOFLOXACIN 750 MG: 5 INJECTION, SOLUTION INTRAVENOUS at 22:06

## 2017-11-05 RX ADMIN — ALBUTEROL SULFATE 10 MG: 2.5 SOLUTION RESPIRATORY (INHALATION) at 22:08

## 2017-11-05 RX ADMIN — SODIUM CHLORIDE 1479 ML: 0.9 INJECTION, SOLUTION INTRAVENOUS at 22:06

## 2017-11-05 RX ADMIN — METHYLPREDNISOLONE SODIUM SUCCINATE 125 MG: 125 INJECTION, POWDER, FOR SOLUTION INTRAMUSCULAR; INTRAVENOUS at 23:41

## 2017-11-05 RX ADMIN — IPRATROPIUM BROMIDE 1 MG: 0.5 SOLUTION RESPIRATORY (INHALATION) at 22:08

## 2017-11-06 PROBLEM — E88.09 HYPOALBUMINEMIA: Status: ACTIVE | Noted: 2017-11-06

## 2017-11-06 PROBLEM — E80.6 HYPERBILIRUBINEMIA: Status: ACTIVE | Noted: 2017-11-06

## 2017-11-06 LAB
25(OH)D3 SERPL-MCNC: 9 NG/ML (ref 30–100)
ALBUMIN SERPL BCP-MCNC: 2.3 G/DL (ref 3.5–5)
ALP SERPL-CCNC: 63 U/L (ref 46–116)
ALT SERPL W P-5'-P-CCNC: 9 U/L (ref 12–78)
ANION GAP SERPL CALCULATED.3IONS-SCNC: 14 MMOL/L (ref 4–13)
AST SERPL W P-5'-P-CCNC: 14 U/L (ref 5–45)
ATRIAL RATE: 133 BPM
BACTERIA UR QL AUTO: ABNORMAL /HPF
BASOPHILS # BLD MANUAL: 0 THOUSAND/UL (ref 0–0.1)
BASOPHILS NFR MAR MANUAL: 0 % (ref 0–1)
BILIRUB SERPL-MCNC: 1 MG/DL (ref 0.2–1)
BILIRUB UR QL STRIP: ABNORMAL
BUN SERPL-MCNC: 13 MG/DL (ref 5–25)
CALCIUM SERPL-MCNC: 8.4 MG/DL (ref 8.3–10.1)
CHLORIDE SERPL-SCNC: 102 MMOL/L (ref 100–108)
CK SERPL-CCNC: 51 U/L (ref 39–308)
CLARITY UR: ABNORMAL
CO2 SERPL-SCNC: 24 MMOL/L (ref 21–32)
COLOR UR: ABNORMAL
CREAT SERPL-MCNC: 0.8 MG/DL (ref 0.6–1.3)
EOSINOPHIL # BLD MANUAL: 0 THOUSAND/UL (ref 0–0.4)
EOSINOPHIL NFR BLD MANUAL: 0 % (ref 0–6)
ERYTHROCYTE [DISTWIDTH] IN BLOOD BY AUTOMATED COUNT: 12.7 % (ref 11.6–15.1)
GFR SERPL CREATININE-BSD FRML MDRD: 88 ML/MIN/1.73SQ M
GLUCOSE SERPL-MCNC: 136 MG/DL (ref 65–140)
GLUCOSE UR STRIP-MCNC: NEGATIVE MG/DL
HCT VFR BLD AUTO: 39 % (ref 36.5–49.3)
HGB BLD-MCNC: 12.8 G/DL (ref 12–17)
HGB UR QL STRIP.AUTO: ABNORMAL
KETONES UR STRIP-MCNC: ABNORMAL MG/DL
L PNEUMO1 AG UR QL IA.RAPID: NEGATIVE
LACTATE SERPL-SCNC: 1.3 MMOL/L (ref 0.5–2)
LEUKOCYTE ESTERASE UR QL STRIP: NEGATIVE
LYMPHOCYTES # BLD AUTO: 0 % (ref 14–44)
LYMPHOCYTES # BLD AUTO: 0 THOUSAND/UL (ref 0.6–4.47)
MAGNESIUM SERPL-MCNC: 1.8 MG/DL (ref 1.6–2.6)
MCH RBC QN AUTO: 31 PG (ref 26.8–34.3)
MCHC RBC AUTO-ENTMCNC: 32.8 G/DL (ref 31.4–37.4)
MCV RBC AUTO: 94 FL (ref 82–98)
MONOCYTES # BLD AUTO: 0.14 THOUSAND/UL (ref 0–1.22)
MONOCYTES NFR BLD: 1 % (ref 4–12)
MUCOUS THREADS UR QL AUTO: ABNORMAL
NEUTROPHILS # BLD MANUAL: 13.87 THOUSAND/UL (ref 1.85–7.62)
NEUTS SEG NFR BLD AUTO: 99 % (ref 43–75)
NITRITE UR QL STRIP: NEGATIVE
NON-SQ EPI CELLS URNS QL MICRO: ABNORMAL /HPF
P AXIS: 82 DEGREES
PH UR STRIP.AUTO: 6 [PH] (ref 4.5–8)
PHOSPHATE SERPL-MCNC: 3.1 MG/DL (ref 2.3–4.1)
PLATELET # BLD AUTO: 203 THOUSANDS/UL (ref 149–390)
PLATELET BLD QL SMEAR: ADEQUATE
PMV BLD AUTO: 10.2 FL (ref 8.9–12.7)
POTASSIUM SERPL-SCNC: 3.8 MMOL/L (ref 3.5–5.3)
PR INTERVAL: 142 MS
PROT SERPL-MCNC: 6.5 G/DL (ref 6.4–8.2)
PROT UR STRIP-MCNC: ABNORMAL MG/DL
QRS AXIS: -6 DEGREES
QRSD INTERVAL: 72 MS
QT INTERVAL: 278 MS
QTC INTERVAL: 413 MS
RBC # BLD AUTO: 4.13 MILLION/UL (ref 3.88–5.62)
RBC #/AREA URNS AUTO: ABNORMAL /HPF
S PNEUM AG UR QL: NEGATIVE
SODIUM SERPL-SCNC: 140 MMOL/L (ref 136–145)
SP GR UR STRIP.AUTO: >=1.03 (ref 1–1.03)
T WAVE AXIS: 51 DEGREES
THEOPHYLLINE SERPL-MCNC: <2 UG/ML (ref 10–20)
TOTAL CELLS COUNTED SPEC: 100
TROPONIN I SERPL-MCNC: <0.02 NG/ML
TSH SERPL DL<=0.05 MIU/L-ACNC: 0.23 UIU/ML (ref 0.36–3.74)
UROBILINOGEN UR QL STRIP.AUTO: 2 E.U./DL
VENTRICULAR RATE: 133 BPM
WBC # BLD AUTO: 14.01 THOUSAND/UL (ref 4.31–10.16)
WBC #/AREA URNS AUTO: ABNORMAL /HPF

## 2017-11-06 PROCEDURE — 82306 VITAMIN D 25 HYDROXY: CPT | Performed by: INTERNAL MEDICINE

## 2017-11-06 PROCEDURE — 99285 EMERGENCY DEPT VISIT HI MDM: CPT

## 2017-11-06 PROCEDURE — 85014 HEMATOCRIT: CPT

## 2017-11-06 PROCEDURE — 87086 URINE CULTURE/COLONY COUNT: CPT | Performed by: INTERNAL MEDICINE

## 2017-11-06 PROCEDURE — 97116 GAIT TRAINING THERAPY: CPT | Performed by: PHYSICAL THERAPIST

## 2017-11-06 PROCEDURE — 83605 ASSAY OF LACTIC ACID: CPT | Performed by: INTERNAL MEDICINE

## 2017-11-06 PROCEDURE — 87205 SMEAR GRAM STAIN: CPT | Performed by: INTERNAL MEDICINE

## 2017-11-06 PROCEDURE — 82550 ASSAY OF CK (CPK): CPT | Performed by: INTERNAL MEDICINE

## 2017-11-06 PROCEDURE — 94760 N-INVAS EAR/PLS OXIMETRY 1: CPT

## 2017-11-06 PROCEDURE — 85027 COMPLETE CBC AUTOMATED: CPT | Performed by: INTERNAL MEDICINE

## 2017-11-06 PROCEDURE — 84134 ASSAY OF PREALBUMIN: CPT | Performed by: INTERNAL MEDICINE

## 2017-11-06 PROCEDURE — 82330 ASSAY OF CALCIUM: CPT

## 2017-11-06 PROCEDURE — 84484 ASSAY OF TROPONIN QUANT: CPT | Performed by: INTERNAL MEDICINE

## 2017-11-06 PROCEDURE — 87633 RESP VIRUS 12-25 TARGETS: CPT | Performed by: INTERNAL MEDICINE

## 2017-11-06 PROCEDURE — 84132 ASSAY OF SERUM POTASSIUM: CPT

## 2017-11-06 PROCEDURE — 82803 BLOOD GASES ANY COMBINATION: CPT

## 2017-11-06 PROCEDURE — 85007 BL SMEAR W/DIFF WBC COUNT: CPT | Performed by: INTERNAL MEDICINE

## 2017-11-06 PROCEDURE — 97166 OT EVAL MOD COMPLEX 45 MIN: CPT

## 2017-11-06 PROCEDURE — G8988 SELF CARE GOAL STATUS: HCPCS

## 2017-11-06 PROCEDURE — G8978 MOBILITY CURRENT STATUS: HCPCS | Performed by: PHYSICAL THERAPIST

## 2017-11-06 PROCEDURE — 80053 COMPREHEN METABOLIC PANEL: CPT | Performed by: INTERNAL MEDICINE

## 2017-11-06 PROCEDURE — 83036 HEMOGLOBIN GLYCOSYLATED A1C: CPT | Performed by: INTERNAL MEDICINE

## 2017-11-06 PROCEDURE — 36600 WITHDRAWAL OF ARTERIAL BLOOD: CPT

## 2017-11-06 PROCEDURE — 83735 ASSAY OF MAGNESIUM: CPT | Performed by: INTERNAL MEDICINE

## 2017-11-06 PROCEDURE — 94640 AIRWAY INHALATION TREATMENT: CPT

## 2017-11-06 PROCEDURE — 81001 URINALYSIS AUTO W/SCOPE: CPT | Performed by: INTERNAL MEDICINE

## 2017-11-06 PROCEDURE — 97163 PT EVAL HIGH COMPLEX 45 MIN: CPT | Performed by: PHYSICAL THERAPIST

## 2017-11-06 PROCEDURE — 92610 EVALUATE SWALLOWING FUNCTION: CPT | Performed by: SPEECH-LANGUAGE PATHOLOGIST

## 2017-11-06 PROCEDURE — G8998 SWALLOW D/C STATUS: HCPCS | Performed by: SPEECH-LANGUAGE PATHOLOGIST

## 2017-11-06 PROCEDURE — 97535 SELF CARE MNGMENT TRAINING: CPT

## 2017-11-06 PROCEDURE — G8996 SWALLOW CURRENT STATUS: HCPCS | Performed by: SPEECH-LANGUAGE PATHOLOGIST

## 2017-11-06 PROCEDURE — 84100 ASSAY OF PHOSPHORUS: CPT | Performed by: INTERNAL MEDICINE

## 2017-11-06 PROCEDURE — G8979 MOBILITY GOAL STATUS: HCPCS | Performed by: PHYSICAL THERAPIST

## 2017-11-06 PROCEDURE — 87449 NOS EACH ORGANISM AG IA: CPT | Performed by: INTERNAL MEDICINE

## 2017-11-06 PROCEDURE — 84295 ASSAY OF SERUM SODIUM: CPT

## 2017-11-06 PROCEDURE — 87070 CULTURE OTHR SPECIMN AEROBIC: CPT | Performed by: INTERNAL MEDICINE

## 2017-11-06 PROCEDURE — 82947 ASSAY GLUCOSE BLOOD QUANT: CPT

## 2017-11-06 PROCEDURE — G8987 SELF CARE CURRENT STATUS: HCPCS

## 2017-11-06 PROCEDURE — 84443 ASSAY THYROID STIM HORMONE: CPT | Performed by: INTERNAL MEDICINE

## 2017-11-06 PROCEDURE — G8997 SWALLOW GOAL STATUS: HCPCS | Performed by: SPEECH-LANGUAGE PATHOLOGIST

## 2017-11-06 RX ORDER — GUAIFENESIN/DEXTROMETHORPHAN 100-10MG/5
10 SYRUP ORAL EVERY 4 HOURS PRN
Status: DISCONTINUED | OUTPATIENT
Start: 2017-11-06 | End: 2017-11-11 | Stop reason: HOSPADM

## 2017-11-06 RX ORDER — AZITHROMYCIN 250 MG/1
500 TABLET, FILM COATED ORAL EVERY 24 HOURS
Status: DISCONTINUED | OUTPATIENT
Start: 2017-11-07 | End: 2017-11-11

## 2017-11-06 RX ORDER — NICOTINE 21 MG/24HR
1 PATCH, TRANSDERMAL 24 HOURS TRANSDERMAL DAILY
Status: DISCONTINUED | OUTPATIENT
Start: 2017-11-06 | End: 2017-11-11 | Stop reason: HOSPADM

## 2017-11-06 RX ORDER — OXYCODONE HYDROCHLORIDE 5 MG/1
5 TABLET ORAL EVERY 4 HOURS PRN
Status: DISCONTINUED | OUTPATIENT
Start: 2017-11-06 | End: 2017-11-11 | Stop reason: HOSPADM

## 2017-11-06 RX ORDER — ATORVASTATIN CALCIUM 40 MG/1
80 TABLET, FILM COATED ORAL
Status: DISCONTINUED | OUTPATIENT
Start: 2017-11-06 | End: 2017-11-11 | Stop reason: HOSPADM

## 2017-11-06 RX ORDER — LEVALBUTEROL 1.25 MG/.5ML
1.25 SOLUTION, CONCENTRATE RESPIRATORY (INHALATION)
Status: DISCONTINUED | OUTPATIENT
Start: 2017-11-06 | End: 2017-11-11 | Stop reason: HOSPADM

## 2017-11-06 RX ORDER — PANTOPRAZOLE SODIUM 40 MG/1
40 TABLET, DELAYED RELEASE ORAL
Status: DISCONTINUED | OUTPATIENT
Start: 2017-11-06 | End: 2017-11-11 | Stop reason: HOSPADM

## 2017-11-06 RX ORDER — MELATONIN
1000 DAILY
Status: DISCONTINUED | OUTPATIENT
Start: 2017-11-06 | End: 2017-11-06

## 2017-11-06 RX ORDER — ONDANSETRON 2 MG/ML
4 INJECTION INTRAMUSCULAR; INTRAVENOUS EVERY 6 HOURS PRN
Status: DISCONTINUED | OUTPATIENT
Start: 2017-11-06 | End: 2017-11-11 | Stop reason: HOSPADM

## 2017-11-06 RX ORDER — CHOLECALCIFEROL (VITAMIN D3) 125 MCG
250 CAPSULE ORAL DAILY
Status: DISCONTINUED | OUTPATIENT
Start: 2017-11-06 | End: 2017-11-11 | Stop reason: HOSPADM

## 2017-11-06 RX ORDER — ASPIRIN 81 MG/1
81 TABLET, CHEWABLE ORAL DAILY
Status: DISCONTINUED | OUTPATIENT
Start: 2017-11-06 | End: 2017-11-11 | Stop reason: HOSPADM

## 2017-11-06 RX ORDER — METHYLPREDNISOLONE SODIUM SUCCINATE 40 MG/ML
40 INJECTION, POWDER, LYOPHILIZED, FOR SOLUTION INTRAMUSCULAR; INTRAVENOUS EVERY 12 HOURS SCHEDULED
Status: DISCONTINUED | OUTPATIENT
Start: 2017-11-06 | End: 2017-11-07

## 2017-11-06 RX ORDER — ERGOCALCIFEROL 1.25 MG/1
50000 CAPSULE ORAL WEEKLY
Status: DISCONTINUED | OUTPATIENT
Start: 2017-11-06 | End: 2017-11-11 | Stop reason: HOSPADM

## 2017-11-06 RX ORDER — SODIUM CHLORIDE FOR INHALATION 0.9 %
3 VIAL, NEBULIZER (ML) INHALATION
Status: DISCONTINUED | OUTPATIENT
Start: 2017-11-06 | End: 2017-11-06

## 2017-11-06 RX ORDER — ACETAMINOPHEN 325 MG/1
650 TABLET ORAL EVERY 6 HOURS PRN
Status: DISCONTINUED | OUTPATIENT
Start: 2017-11-06 | End: 2017-11-11 | Stop reason: HOSPADM

## 2017-11-06 RX ORDER — SODIUM CHLORIDE 9 MG/ML
50 INJECTION, SOLUTION INTRAVENOUS CONTINUOUS
Status: DISCONTINUED | OUTPATIENT
Start: 2017-11-06 | End: 2017-11-09

## 2017-11-06 RX ADMIN — NICOTINE 1 PATCH: 21 PATCH, EXTENDED RELEASE TRANSDERMAL at 01:15

## 2017-11-06 RX ADMIN — SODIUM CHLORIDE 100 ML/HR: 0.9 INJECTION, SOLUTION INTRAVENOUS at 01:02

## 2017-11-06 RX ADMIN — LEVALBUTEROL 1.25 MG: 1.25 SOLUTION, CONCENTRATE RESPIRATORY (INHALATION) at 19:34

## 2017-11-06 RX ADMIN — ASPIRIN 81 MG CHEWABLE TABLET 81 MG: 81 TABLET CHEWABLE at 09:59

## 2017-11-06 RX ADMIN — CHOLECALCIFEROL TAB 25 MCG (1000 UNIT) 1000 UNITS: 25 TAB at 09:59

## 2017-11-06 RX ADMIN — NICOTINE 1 PATCH: 21 PATCH, EXTENDED RELEASE TRANSDERMAL at 21:40

## 2017-11-06 RX ADMIN — CYANOCOBALAMIN TAB 500 MCG 250 MCG: 500 TAB at 09:59

## 2017-11-06 RX ADMIN — IPRATROPIUM BROMIDE 0.5 MG: 0.5 SOLUTION RESPIRATORY (INHALATION) at 14:05

## 2017-11-06 RX ADMIN — IPRATROPIUM BROMIDE 0.5 MG: 0.5 SOLUTION RESPIRATORY (INHALATION) at 19:34

## 2017-11-06 RX ADMIN — METHYLPREDNISOLONE SODIUM SUCCINATE 40 MG: 40 INJECTION, POWDER, FOR SOLUTION INTRAMUSCULAR; INTRAVENOUS at 10:00

## 2017-11-06 RX ADMIN — ERGOCALCIFEROL 50000 UNITS: 1.25 CAPSULE ORAL at 20:14

## 2017-11-06 RX ADMIN — ATORVASTATIN CALCIUM 80 MG: 40 TABLET, FILM COATED ORAL at 18:15

## 2017-11-06 RX ADMIN — SODIUM CHLORIDE 100 ML/HR: 0.9 INJECTION, SOLUTION INTRAVENOUS at 21:41

## 2017-11-06 RX ADMIN — THEOPHYLLINE ANHYDROUS 400 MG: 400 CAPSULE, EXTENDED RELEASE ORAL at 10:00

## 2017-11-06 RX ADMIN — LEVALBUTEROL 1.25 MG: 1.25 SOLUTION, CONCENTRATE RESPIRATORY (INHALATION) at 14:05

## 2017-11-06 RX ADMIN — LEVALBUTEROL 1.25 MG: 1.25 SOLUTION, CONCENTRATE RESPIRATORY (INHALATION) at 08:14

## 2017-11-06 RX ADMIN — PANTOPRAZOLE SODIUM 40 MG: 40 TABLET, DELAYED RELEASE ORAL at 05:23

## 2017-11-06 RX ADMIN — SODIUM CHLORIDE 100 ML/HR: 0.9 INJECTION, SOLUTION INTRAVENOUS at 12:33

## 2017-11-06 RX ADMIN — ENOXAPARIN SODIUM 40 MG: 40 INJECTION SUBCUTANEOUS at 01:15

## 2017-11-06 RX ADMIN — AZITHROMYCIN MONOHYDRATE 500 MG: 500 INJECTION, POWDER, LYOPHILIZED, FOR SOLUTION INTRAVENOUS at 10:00

## 2017-11-06 RX ADMIN — ISODIUM CHLORIDE 3 ML: 0.03 SOLUTION RESPIRATORY (INHALATION) at 08:14

## 2017-11-06 RX ADMIN — GUAIFENESIN AND DEXTROMETHORPHAN 10 ML: 100; 10 SYRUP ORAL at 23:52

## 2017-11-06 RX ADMIN — METHYLPREDNISOLONE SODIUM SUCCINATE 40 MG: 40 INJECTION, POWDER, FOR SOLUTION INTRAMUSCULAR; INTRAVENOUS at 21:40

## 2017-11-06 NOTE — ED PROVIDER NOTES
History  Chief Complaint   Patient presents with    Shortness of Breath     Shortness of breath since yesterday, no relief with neb treatments     Patient: Nuvia Riojas  72 y o /male  YOB: 1945  MRN: [id number]  PCP: Amaury Mi PA-C  Date of evaluation: 11/05/17    (DEISI Bell may have been used in the preparation of this document )    History gets given by the woman at the bedside who lives in the same house with him  He has been short of breath and coughing for the past day  He has been using his nebulizer repeatedly with minimal relief  Unknown if it is productive  He is eating normally  History provided by: Live-in person at bedside  History limited by:  Patient nonverbal  Shortness of Breath   Severity:  Severe  Onset quality:  Gradual  Duration:  1 day  Timing:  Constant  Progression:  Unchanged  Chronicity:  Recurrent  Relieved by:  Nothing  Worsened by:  Nothing  Ineffective treatments: nebulizer  Associated symptoms: cough    Associated symptoms: no diaphoresis, no fever and no vomiting        Prior to Admission Medications   Prescriptions Last Dose Informant Patient Reported? Taking?    Incontinence Supply Disposable ([employer] CLEANSING WIPES) MISC   Yes No   Sig: [employer] Cleansing Wipes Miscellaneous use as directed  Quantity: 1;  Refills: 5    Saba Chelsea CRNP;  Started 12-Nov-2015 [id number] Miscellaneous Package (3 Packages)   Incontinence Supply Disposable (DEPEND GUARDS FOR MEN) MISC   Yes No   Sig: Depend Guards for Men Miscellaneous use as directed  Quantity: 1;  Refills: 5    Saba Chelsea CRNP;  Started 12-Nov-2015 [id number] Miscellaneous Package (2 Packages)   Incontinence Supply Disposable (DEPEND UNDERGARMENTS) MISC   Yes No   Sig: Depend Undergarments Miscellaneous use as directed  Quantity: 100;  Refills: 5    Saba Chelsea CRNP;  Started 12-Nov-2015 Active   Ipratropium-Albuterol (COMBIVENT RESPIMAT)  MCG/ACT AERS   Yes No   Sig: Combivent Respimat  MCG/ACT Inhalation Aerosol Solution INHALE 1 PUFF 4 TIMES DAILY (MAXIMUM OF 6 PUFFS IN 24 HOURS)  Quantity: 1;  Refills: 3    Ermalene Rather CRNP; Active   Mometasone Furo-Formoterol Fum (DULERA) 200-5 MCG/ACT AERO   Yes No   Sig: Dulera 200-5 MCG/ACT Inhalation Aerosol INHALE 2 PUFFS TWICE DAILY  RINSE MOUTH AFTER USE  Quantity: 0;  Refills: 0   , M D ; Active   aspirin 81 mg chewable tablet   No No   Sig: Chew 1 tablet daily   atorvastatin (LIPITOR) 80 mg tablet   No No   Sig: Take 1 tablet by mouth daily with dinner   cyanocobalamin 250 MCG tablet   No No   Sig: Take 1 tablet by mouth daily for 30 days   dextromethorphan-guaiFENesin (ROBITUSSIN DM)  mg/5 mL syrup   No No   Sig: Take 10 mL by mouth every 4 (four) hours as needed for cough   ergocalciferol (VITAMIN D2) 50,000 units   No No   Sig: Take 1 capsule by mouth once a week for 7 doses   pantoprazole (PROTONIX) 40 mg tablet   No No   Sig: Take 1 tablet by mouth 2 (two) times a day before meals for 30 days   predniSONE 20 mg tablet   No No   Sig: Take 2 tablets daily, decrease by 1/2 tablet every 2 days   theophylline (UNIPHYL) 400 mg 24 hr tablet   Yes No   Sig: Theophylline  MG Oral Tablet Extended Release 24 Hour TAKE 1 TABLET DAILY AS DIRECTED  Quantity: 30;  Refills: 11    Willem Dunlap DO;  Started 22-Dec-2015 Active      Facility-Administered Medications: None       Past Medical History:   Diagnosis Date    Anemia     Asthma     [facility]     Heart disease     Left wrist injury     Low testosterone     Stroke ([facility] [address] )     Vitamin D deficiency        History reviewed  No pertinent surgical history  History reviewed  No pertinent family history  I have reviewed and agree with the history as documented      Social History   Substance Use Topics    Smoking status: Heavy Tobacco Smoker     Packs/day: 2 00    Smokeless tobacco: Never Used      Comment: 2 to 3 packs a day    Alcohol use No        Review of Systems   Unable to perform ROS: Patient nonverbal   Constitutional: Negative for diaphoresis and fever  HENT: Negative for trouble swallowing and voice change  Respiratory: Positive for cough and shortness of breath  Gastrointestinal: Positive for diarrhea (nonbloody)  Negative for vomiting  Psychiatric/Behavioral: Negative for agitation and behavioral problems  Physical Exam  ED Triage Vitals [11/05/17 2117]   Temperature Pulse Respirations Blood Pressure SpO2   100 5 °F (38 1 °C) (!) 137 18 122/73 (!) 89 %      Temp Source Heart Rate Source Patient Position - Orthostatic VS BP Location FiO2 (%)   Temporal Monitor Sitting Left arm --      Pain Score       No Pain           Orthostatic Vital Signs  Vitals:    11/05/17 2117 11/05/17 2130 11/05/17 2200   BP: 122/73 106/64 112/69   Pulse: (!) 137 (!) 130 (!) 130   Patient Position - Orthostatic VS: Sitting Lying Lying       Physical Exam   Constitutional: He is oriented to person, place, and time  He appears well-developed and well-nourished  He appears cachectic  He appears ill  HENT:   Mouth/Throat: Oropharynx is clear and moist and mucous membranes are normal    Voice normal   Eyes: EOM are normal  Pupils are equal, round, and reactive to light  Cardiovascular: Normal rate and regular rhythm  Pulmonary/Chest: Effort normal  No accessory muscle usage  Tachypnea noted  He has decreased breath sounds  He has no wheezes  He has no rhonchi  He has rales in the right middle field, the right lower field, the left middle field and the left lower field  Abdominal: Soft  Bowel sounds are normal    Neurological: He is alert and oriented to person, place, and time  GCS eye subscore is 4  GCS verbal subscore is 5  GCS motor subscore is 6  Unable to assess further   Skin: Skin is warm and dry  Psychiatric: He has a normal mood and affect  His mood appears not anxious  He is slowed  He is noncommunicative  Nursing note and vitals reviewed  ED Medications  Medications   levofloxacin (LEVAQUIN) IVPB (premix) 750 mg (750 mg Intravenous New Bag 11/5/17 2206)   sodium chloride (PF) 0 9 % injection 3 mL (not administered)   sodium chloride 0 9 % bolus 1,479 mL (0 mL/kg × 49 3 kg Intravenous Stopped 11/5/17 2252)     Followed by   sodium chloride 0 9 % bolus 1 mL (not administered)   albuterol inhalation solution 10 mg (10 mg Nebulization Given 11/5/17 2208)   ipratropium (ATROVENT) 0 02 % inhalation solution 1 mg (1 mg Nebulization Given 11/5/17 2208)       Diagnostic Studies  Results Reviewed     Procedure Component Value Units Date/Time    Respiratory Pathogen Profile, PCR [[id number]]     Lab Status:  No result Specimen:  Nasopharyngeal from Nasopharyngeal Swab     Blood gas, arterial [[id number]]     Lab Status:  No result Specimen:  Blood, Arterial     Blood gas, arterial [[id number]]  (Abnormal) Collected:  11/05/17 2234    Lab Status:  Final result Specimen:  Blood, Arterial from Radial, Right Updated:  11/05/17 2256     pH, Arterial 7 356     pCO2, Arterial 65 4 (HH) mm Hg      pO2, Arterial 19 2 (LL) mm Hg      HCO3, Arterial 34 8 (H) mmol/L      Base Excess, Arterial 4 9 mmol/L      O2 Content, Arterial 7 5 (L) mL/dL      O2 HGB,Arterial  38 9 (L) %      SOURCE Radial, Right     WICHO TEST Yes     Nasal Cannula 3    BNP [[id number]]  (Abnormal) Collected:  11/05/17 2130    Lab Status:  Final result Specimen:  Blood from Arm, Right Updated:  11/05/17 2236     NT-proBNP 621 (H) pg/mL     Lactic acid, plasma [[id number]]  (Normal) Collected:  11/05/17 2136    Lab Status:  Final result Specimen:  Blood from Arm, Right Updated:  11/05/17 2208     LACTIC ACID 2 0 mmol/L     Narrative:         Result may be elevated if tourniquet was used during collection      Comprehensive metabolic panel [[id number]]  (Abnormal) Collected:  11/05/17 2130    Lab Status:  Final result Specimen:  Blood from Arm, Right Updated:  11/05/17 2203 Sodium 136 mmol/L      Potassium 3 5 mmol/L      Chloride 97 (L) mmol/L      CO2 30 mmol/L      Anion Gap 9 mmol/L      BUN 15 mg/dL      Creatinine 0 97 mg/dL      Glucose 120 mg/dL      Calcium 9 5 mg/dL      AST 19 U/L      ALT 19 U/L      Alkaline Phosphatase 83 U/L      Total Protein 8 4 (H) g/dL      Albumin 3 2 (L) g/dL      Total Bilirubin 1 20 (H) mg/dL      eGFR 77 ml/min/1 73sq m     Narrative:         National Kidney Disease Education Program recommendations are as follows:  GFR calculation is accurate only with a steady state creatinine  Chronic Kidney disease less than 60 ml/min/1 73 sq  meters  Kidney failure less than 15 ml/min/1 73 sq  meters  Protime-INR [[id number]]  (Abnormal) Collected:  11/05/17 2130    Lab Status:  Final result Specimen:  Blood from Arm, Right Updated:  11/05/17 2158     Protime 15 3 (H) seconds      INR 1 22 (H)    APTT [[id number]]  (Abnormal) Collected:  11/05/17 2130    Lab Status:  Final result Specimen:  Blood from Arm, Right Updated:  11/05/17 2158     PTT 43 (H) seconds     Narrative: Therapeutic Heparin Range = 60-90 seconds    CBC and differential [[id number]]  (Abnormal) Collected:  11/05/17 2130    Lab Status:  Final result Specimen:  Blood from Arm, Right Updated:  11/05/17 2142     WBC 20 21 (H) Thousand/uL      RBC 4 97 Million/uL      Hemoglobin 14 9 g/dL      Hematocrit 46 0 %      MCV 93 fL      MCH 30 0 pg      MCHC 32 4 g/dL      RDW 13 0 %      MPV 9 9 fL      Platelets 312 Thousands/uL      Neutrophils Relative 85 (H) %      Lymphocytes Relative 4 (L) %      Monocytes Relative 11 %      Eosinophils Relative 0 %      Basophils Relative 0 %      Neutrophils Absolute 17 18 (H) Thousands/µL      Lymphocytes Absolute 0 78 Thousands/µL      Monocytes Absolute 2 22 (H) Thousand/µL      Eosinophils Absolute 0 00 Thousand/µL      Basophils Absolute 0 03 Thousands/µL     Blood culture [[id number]] Collected:  11/05/17 2136    Lab Status:   In process Specimen:  Blood from Arm, Right Updated:  11/05/17 2140    Blood culture [[id number]] Collected:  11/05/17 2136    Lab Status: In process Specimen:  Blood from Arm, Right Updated:  11/05/17 2140    Troponin I [[id number]] Collected:  11/05/17 2130    Lab Status:  No result Specimen:  Blood from Arm, Right                  XR chest portable    (Results Pending)              Procedures  Procedures       Phone Contacts  ED Phone Contact    ED Course  ED Course as of Nov 05 2316   Sun Nov 05, 2017   2258 Normal pH, Arterial: 7 356   2258 pCO2, Arterial: (!!) 65 4   2258 pO2, Arterial: (!!) 19 2   2258 Suspect venous sample  - RT reports that on her draw it seemed venous  O2 Content, Arterial: (!) 7 5   2259 LACTIC ACID: 2 0   2259 WBC: (!) 20 21                         Initial Sepsis Screening     Row Name 11/05/17 2300 11/05/17 2130             Is the patient's history suggestive of a new or worsening infection?  (!)  Yes (Proceed)  -DA       Suspected source of infection   pneumonia  -DA       Are two or more of the following signs & symptoms of infection both present and new to the patient?  (!)  Yes (Proceed)  -DA       Indicate SIRS criteria   Tachycardia > 90 bpm;Tachypnea > 20 resp per min  -DA       If the answer is yes to both questions, suspicion of sepsis is present  [de-identified]         If severe sepsis is present AND tissue hypoperfusion perists in the hour after fluid resuscitation or lactate > 4, the patient meets criteria for SEPTIC SHOCK           Are any of the following organ dysfunction criteria present within 6 hours of suspected infection and SIRS criteria that are NOT considered to be chronic conditions?  No  -DA         Organ dysfunction           Date of presentation of severe sepsis           Time of presentation of severe sepsis           Tissue hypoperfusion persists in the hour after crystalloid fluid administration, evidenced, by either:           Was hypotension present within one hour of the conclusion of crystalloid fluid administration? Clarene [place]       Date of presentation of septic shock           Time of presentation of septic shock             User Key  (r) = Recorded By, (t) = Taken By, (c) = Cosigned By    [address] Name Provider Type    DA Jose A Flores MD Physician                  MDM  Number of Diagnoses or Management Options  Acute respiratory distress: established and worsening     Amount and/or Complexity of Data Reviewed  Tests in the radiology section of CPT®: ordered and reviewed  Tests in the medicine section of CPT®: ordered and reviewed  Decide to obtain previous medical records or to obtain history from someone other than the patient: yes  Obtain history from someone other than the patient: yes  Independent visualization of images, tracings, or specimens: yes    Risk of Complications, Morbidity, and/or Mortality  Presenting problems: high    Patient Progress  Patient progress: improved    CritCare Time    Disposition  Final diagnoses:   Acute respiratory distress     Time reflects when diagnosis was documented in both MDM as applicable and the Disposition within this note     Time User Action Codes Description Comment    11/5/2017 11:09 PM Deja CRUZ Add [R06 03] Acute respiratory distress       ED Disposition     ED Disposition Condition Comment    Admit  Case was discussed with Dr Morenita Mac [DO] for [facility]   and the patient's admission status was agreed to be Admission Status: inpatient status to the service of Dr Dr Morenita Mac [DO] for [facility]     Follow-up Information    None       Patient's Medications   Discharge Prescriptions    No medications on file     No discharge procedures on file      ED Provider  Electronically Signed by           Jose A Flores MD  11/07/17 2004

## 2017-11-06 NOTE — ED NOTES
Patient placed on 3L O2 via nasal canula for low O2 saturation of 83% on RA  Patient came up to 93%       Eddie Mendez RN  11/05/17 2121

## 2017-11-06 NOTE — CONSULTS
Pulmonary Consultation   Trae March 74 y o  male MRN: [id number]  Unit/Bed#:  Encounter: [id number]      Reason for consultation:  COPD exacerbation, acute tracheobronchitis    Requesting physician: Dr Fco Lopez    Impressions/Recommendations:     · COPD with acute exacerbation -  improving on IV steroids  He can be transitioned to prednisone starting tomorrow  Continue Xopenex 3 times daily  Add Atrovent with Xopenex  Resume Combivent on discharge  Start Advair, but resume Dulera on discharge  He will need follow-up in our office  · Acute tracheobronchitis - follow-up sputum culture data  Complete 5 days of azithromycin      · Ongoing tobacco use - smoking cessation, nicotine patch      History of Present Illness   HPI:  Trae March is a 74 y o  male who has history of COPD, maintained on Combivent and Martha Fear as an outpatient  He is also on theophylline  He was last seen in our office by Dr Aziza Suarez in May of this year  He was admitted on 11/05/2017 with complaints of worsening shortness of breath  Patient does have oxygen at home and uses it on an as-needed basis and during hours of sleep  Patient reports a cough with scant sputum production  He was noted to be wheezing on admission and started on steroids  Patient is feeling better today  Also met SIRS criteria, started on antibiotics  Review of systems:  Patient denies headache or vision changes  Patient reports weight loss but is unable to quantify    Denies sore throat or runny nose  Denies fever, chills or sweats  Denies chest pain or palpitations  Denies nausea, vomiting or diarrhea  Denies lower extremity edema  Denies skin rashes  Denies dysuria or hematuria  All other 12-point review of systems are negative      Historical Information   Past Medical History:   Diagnosis Date    Anemia     Asthma     [facility]) [facility] [address]     Heart disease     Left wrist injury     Low testosterone     Stroke ([facility] [address]     Vitamin D deficiency      History reviewed  No pertinent surgical history  History reviewed  No pertinent family history  Tobacco history:  Patient smokes up to 1 pack per day for the past 50+ years    Family history:  Negative from pulmonary standpoint    Meds/Allergies   Current Facility-Administered Medications   Medication Dose Route Frequency    acetaminophen (TYLENOL) tablet 650 mg  650 mg Oral Q6H PRN    aspirin chewable tablet 81 mg  81 mg Oral Daily    atorvastatin (LIPITOR) tablet 80 mg  80 mg Oral Daily With Dinner    [START ON 11/7/2017] azithromycin (ZITHROMAX) tablet 500 mg  500 mg Oral Q24H    cholecalciferol (VITAMIN D3) tablet 1,000 Units  1,000 Units Oral Daily    cyanocobalamin (VITAMIN B-12) tablet 250 mcg  250 mcg Oral Daily    dextromethorphan-guaiFENesin (ROBITUSSIN DM)  mg/5 mL oral syrup 10 mL  10 mL Oral Q4H PRN    enoxaparin (LOVENOX) subcutaneous injection 40 mg  40 mg Subcutaneous Q24H    levalbuterol (XOPENEX) inhalation solution 1 25 mg  1 25 mg Nebulization TID    methylPREDNISolone sodium succinate (Solu-MEDROL) injection 40 mg  40 mg Intravenous Q12H [address]    nicotine (NICODERM CQ) 21 mg/24 hr TD 24 hr patch 1 patch  1 patch Transdermal Daily    ondansetron (ZOFRAN) injection 4 mg  4 mg Intravenous Q6H PRN    oxyCODONE (ROXICODONE) IR tablet 5 mg  5 mg Oral Q4H PRN    pantoprazole (PROTONIX) EC tablet 40 mg  40 mg Oral Early Morning    sodium chloride (PF) 0 9 % injection 3 mL  3 mL Intravenous PRN    sodium chloride 0 9 % infusion  100 mL/hr Intravenous Continuous    sodium chloride 0 9 % inhalation solution 3 mL  3 mL Nebulization TID    theophylline (HOLLY-24) 24 hr capsule 400 mg  400 mg Oral Daily     No Known Allergies    Vitals: Blood pressure 111/60, pulse 82, temperature 97 6 °F (36 4 °C), temperature source Temporal, resp  rate 20, height 5' 6" (1 676 m), weight 49 3 kg (108 lb 11 oz), SpO2 97 %  , 3 L, Body mass index is 17 54 kg/m²  Intake/Output Summary (Last 24 hours) at 11/06/17 1152  Last data filed at 11/06/17 0401   Gross per 24 hour   Intake             1629 ml   Output              100 ml   Net             1529 ml       Physical exam:    General Appearance:    Alert, cooperative, no conversational dyspnea or accessory     muscle use       Head/eyes:    Normocephalic, without obvious abnormality, atraumatic,         PERRL, extraocular muscles intact, no scleral icterus    Nose:   Nares normal, septum midline, mucosa normal, no drainage    or sinus tenderness   Throat:   Moist mucous membranes, no thrush   Neck:   Supple, trachea midline, no adenopathy; no carotid    bruit or JVD   Lungs:     Decreased breath sounds throughout without wheeze, rales or rhonchi   Chest Wall:    No tenderness or deformity    Heart:    Regular rate and rhythm, S1 and S2 normal, no murmur, rub   or gallop   Abdomen:     Soft, non-tender, bowel sounds active all four quadrants,     no masses, no organomegaly   Extremities:   Extremities normal, atraumatic, no cyanosis or edema   Skin:   Warm, dry, turgor normal, no rashes or lesions   Lymph nodes:   Cervical and supraclavicular nodes normal   Neurologic:   CNII-XII intact, normal strength, non-focal     Labs: I have personally reviewed pertinent lab results        Results from last 7 days  Lab Units 11/06/17  0520 11/05/17  2130   WBC Thousand/uL 14 01* 20 21*   HEMOGLOBIN g/dL 12 8 14 9   HEMATOCRIT % 39 0 46 0   PLATELETS Thousands/uL 203 274           Results from last 7 days  Lab Units 11/06/17  0520 11/05/17  2130   SODIUM mmol/L 140 136   POTASSIUM mmol/L 3 8 3 5   CHLORIDE mmol/L 102 97*   CO2 mmol/L 24 30   BUN mg/dL 13 15   CREATININE mg/dL 0 80 0 97   CALCIUM mg/dL 8 4 9 5   TOTAL PROTEIN g/dL 6 5 8 4*   BILIRUBIN TOTAL mg/dL 1 00 1 20*   ALK PHOS U/L 63 83   ALT U/L 9* 19   AST U/L 14 19   GLUCOSE RANDOM mg/dL 136 120       Results from last 7 days  Lab Units 11/05/17  2130   INR  1 22*   PTT seconds 43*       Results from last 7 days  Lab Units 11/06/17  0520   MAGNESIUM mg/dL 1 8     VBG - 7 35, pCO2 65, PO2 19  NT-    Imaging and other studies: I have personally reviewed pertinent films in PACS chest x-ray from 11/05/2017 shows clear lungs with blunting of the costophrenic angles, stable from May 2017    Pulmonary function testing:  None on file    Code Status: Level 1 - Full Code    Thank you for allowing us to participate in the care of your patient      Sunday Chacon DO

## 2017-11-06 NOTE — PLAN OF CARE
Problem: Nutrition/Hydration-ADULT  Goal: Nutrient/Hydration intake appropriate for improving, restoring or maintaining nutritional needs  Monitor and assess patient's nutrition/hydration status for malnutrition (ex- brittle hair, bruises, dry skin, pale skin and conjunctiva, muscle wasting, smooth red tongue, and disorientation)  Collaborate with interdisciplinary team and initiate plan and interventions as ordered  Monitor patient's weight and dietary intake as ordered or per policy  Utilize nutrition screening tool and intervene per policy  Determine patient's food preferences and provide high-protein, high-caloric foods as appropriate  INTERVENTIONS:  - Monitor oral intake, urinary output, labs, and treatment plans  - Assess nutrition and hydration status and recommend course of action  - Evaluate amount of meals eaten  - Assist patient with eating if necessary   - Allow adequate time for meals  - Recommend/ encourage appropriate diets, oral nutritional supplements, and vitamin/mineral supplements  - Order, calculate, and assess calorie counts as needed  - Recommend, monitor, and adjust tube feedings and TPN/PPN based on assessed needs  - Assess need for intravenous fluids  - Provide specific nutrition/hydration education as appropriate  - Include patient/family/caregiver in decisions related to nutrition    Outcome: Not Progressing  No intake recorded at this time and was unable to interview pt today  Predict pt to have inadequate intake during admission   Will review again tomorrow to assess malnutrition status and interview pt as able

## 2017-11-06 NOTE — CASE MANAGEMENT
Initial Clinical Review    Admission: Date/Time/Statement: 11/5/17 @ 2309     Orders Placed This Encounter   Procedures    Inpatient Admission (expected length of stay for this patient is greater than two midnights)     Standing Status:   Standing     Number of Occurrences:   1     Order Specific Question:   Admitting Physician     Answer:   Peggy Rodas [id number]     Order Specific Question:   Level of Care     Answer:   Med Surg [16]     Order Specific Question:   Bed request comments     Answer:   Stepdown?  - needs telemetry     Order Specific Question:   Estimated length of stay     Answer:   More than 2 Midnights     Order Specific Question:   Certification     Answer:   I certify that inpatient services are medically necessary for this patient for a duration of greater than two midnights  See H&P and MD Progress Notes for additional information about the patient's course of treatment  ED: Date/Time/Mode of Arrival:   ED Arrival Information     Expected Arrival Acuity Means of Arrival Escorted By Service Admission Type    - 11/5/2017 21:10 Emergent Walk-In Family Member General Medicine Emergency    Arrival Complaint    sob          Chief Complaint:   Chief Complaint   Patient presents with    Shortness of Breath     Shortness of breath since yesterday, no relief with neb treatments       History of Illness: 74year-old male who presented to the emergency department with the complaints of worsening shortness of breath  The patient experiences chronic dyspnea secondary to his chronic medical condition of COPD  Over the last 24 hours, the patient's shortness of breath acutely worsened  The patient also developed a fever and a productive cough  Nothing seemed to relieve his symptoms  His shortness of breath worsened with any type of movement or exertion  No abdominal pain  No chest pain  No nausea or vomiting    In the emergency department, the patient was found to meet SIRS criteria        ED Vital Signs:   ED Triage Vitals [11/05/17 2117]   Temperature Pulse Respirations Blood Pressure SpO2   100 5 °F (38 1 °C) (!) 137 18 122/73 (!) 89 %      Temp Source Heart Rate Source Patient Position - Orthostatic VS BP Location FiO2 (%)   Temporal Monitor Sitting Left arm --      Pain Score       No Pain        Wt Readings from Last 1 Encounters:   11/06/17 49 3 kg (108 lb 11 oz)       Vital Signs:  11/05 0701 11/06 0700 11/06 0701 11/06 0905  Most Recent     Temperature (°F) 98100 5 97 6  97 6 (36 4)    Pulse 87137 82  82    Respirations 1832 20  20    Blood Pressure 103/64122/73 111/60  111/60    SpO2 (%) 8997 97  97        Abnormal Labs/Diagnostic Test Results: WBC 20 21, CL 97, Tl bili 1 20   11/05 2234                 pH, Arterial  7 356              pCO2, Arterial  65 4              pO2, Arterial  19 2              HCO3, Arterial  34 8                    ED Treatment:   Medication Administration from 11/05/2017 2110 to 11/06/2017 0025       Date/Time Order Dose Route Action Action by Comments                11/05/2017 2206 levofloxacin (LEVAQUIN) IVPB (premix) 750 mg 750 mg Intravenous [address] Kierra Lemus RN                 11/05/2017 2206 sodium chloride 0 9 % bolus 1,479 mL 1,479 mL Intravenous [address] Kierra Lemus RN      11/05/2017 2208 albuterol inhalation solution 10 mg 10 mg Nebulization Given Kierra Lemus RN      11/05/2017 2208 ipratropium (ATROVENT) 0 02 % inhalation solution 1 mg 1 mg Nebulization Given Kierra Lemus RN      11/05/2017 2341 methylPREDNISolone sodium succinate (Solu-MEDROL) injection 125 mg 125 mg Intravenous Given Kierra Lemus RN           Past Medical/Surgical History:   Past Medical History:   Diagnosis Date    Anemia     Asthma     [facility] [address] [facility]     Heart disease     Left wrist injury     Low testosterone     Stroke [facility] [address] [facility]     Vitamin D deficiency        Admitting Diagnosis: Shortness of breath [R06 02]  Acute respiratory distress [R06 03]    Age/Sex: 74 y o  male    Assessment/Plan:    Assessment:      Patient Active Problem List   Diagnosis    Vitamin D deficiency    [facility] [address] [facility]    [facility]    Leukocytosis    Nicotine dependence    Acute tracheobronchitis    Tobacco abuse    BMI less than 19,adult    Hypoalbuminemia    Hyperbilirubinemia         Plan:  1)  Sepsis (Present on admission) secondary to acute tracheobronchitis/Acute tracheobronchitis/Leukocytosis/COPD with acute exacerbation:  Admit the patient to inpatient status, med/surg level of care with telemetry monitoring  The patient will require at least a 2-midnight inpatient hospitalization for work-up and treatment of the symptomatology  The patient received the initial normal saline IV fluid resuscitation per the sepsis protocol with normal saline IV fluids at 30 mL/kg  He will be continued on normal saline IV fluids at 100 mL/hr  Check a respiratory pathogen profile  Check blood cultures x 2 sets  Check urine culture  Check Streptococcus pneumoniae urinary antigen  Check Legionella urinary antigen  IV methylprednisolone  IV azithromycin  Check sputum culture  Consult pulmonology     2)  Tobacco abuse:  Nicotine patch  Smoking cessation counseling      3)  BMI less than 19/Hypoalbuminemia:  Check a prealbumin level  Nutrition consult      4)  DVT Prophylaxis:  Lovenox 40 mg SQ every 24 hours    SCD's bilaterally         Admission Orders:  Admit to M/S/Tele unit  Telem  O2 3L nc --O2 to keep sat >90%  PT/OT/Speech evals  Respiratory protocol  Consult pulmonology  Up with assistance  Monitor I&O's, daily weights    Scheduled Meds:   aspirin 81 mg Oral Daily   atorvastatin 80 mg Oral Daily With Dinner   azithromycin 500 mg Intravenous Q24H   cholecalciferol 1,000 Units Oral Daily   cyanocobalamin 250 mcg Oral Daily   enoxaparin 40 mg Subcutaneous Q24H   levalbuterol 1 25 mg Nebulization TID methylPREDNISolone sodium succinate 40 mg Intravenous Q12H [address]   nicotine 1 patch Transdermal Daily   pantoprazole 40 mg Oral Early Morning   sodium chloride 3 mL Nebulization TID   theophylline 400 mg Oral Daily     Continuous Infusions:   sodium chloride 100 mL/hr Last Rate: 100 mL/hr (11/06/17 0102)     PRN Meds:   acetaminophen    dextromethorphan-guaiFENesin    ondansetron    oxyCODONE

## 2017-11-06 NOTE — SPEECH THERAPY NOTE
11/06/17 1741   Swallow Information   Current Risks for Dysphagia & Aspiration Respiratory compromise   Current Symptoms/Concerns Cough; With liquids;Decreased oral intake   Current Diet Regular; Thin liquid   Baseline Diet Regular; Thin liquids   Baseline Assessment   Behavior/Cognition Alert;Confused; Cooperative; Requires cueing   Speech/Language Status WFL   Patient Positioning Upright in chair   Swallow Mechanism Exam   Labial Symmetry WFL   Labial Strength WFL   Labial ROM WFL   Labial Sensation WFL   Facial Symmetry WFL   Facial Strength WFL   Facial ROM WFL   Facial Sensation WFL   Lingual Symmetry WFL   Lingual Strength Mild reduced   Lingual ROM Mild reduced   Lingual Sensation Reduced   Velum Reduced sustained elevation   Gag Unable to assess   Mandible WFL   Dentition Edentulous   Volitional Cough Strong   Consistencies Assessed and Performance   Materials Admnistered Regular/Solid;Puree/Level 1;Soft/Level 3;Mechanical Soft/Level 2; Thin liquid; Nectar thick liquid   Oral Stage Mild impaired   Oral Stage Comment Patient is edentulous; and states his dentures are at home  He is inefficient masticating hard solid food with extended time and fatigue to respiratory system  Mild oral residue present but patient attempts liquid wash with thin liquids and has difficulty with oral control and loss of liquid with premature leakage  Soft and puree foods are more manageable and efficient with less fatigue  Phargngeal Stage Mild impaired   Pharyngeal Stage Comment Patient has no s/s with soft, MS or puree food  He had cough with mixed consistency soup secondary to the thin liquid  Suspect pen/asp before the swallow secondary to premature leakage  Patient had no overt s/s with nectar thick liquids  Swallow Mechanics Good Larygneal rise;Mild delayed;Aspiration risk   Esophageal Concerns No s/s reported   Summary   Swallow Summary Patient presents today with respiratory compromise and was inpatient for SOB   Patient does not have his dentures with him in the hospital  Evaluation with dinner tray reveals patient is able to feed himself; but he has extended and inefficient mastication with hard solid food  He has reduced oral control with thin liquids and suspect premature leakage, which results in probable pen/asp before the swallow as patient has mild delay in swallow initiation  Patient has adequate laryngeal elevation  Patient trialed mixed consistency soup, and again presents with immediate cough, and with nectar liquids there are no s/s  Patient was not able to follow commands for strategies at bedside      Recommendations   Risk for Aspiration Mild   Recommendations Consider oral diet; Dysphagia treatment   Diet Solid Recommendation Level 3 Dysphagia/ advanced/ soft to chew   Diet Liquid Recommendation Nectar thick liquid   Recommended Form of Meds As desired; As tolerated   General Precautions Aspiration precautions; Feed only when alert;Upright as possible for all oral intake;Supervision with meals   Further Evaluations Dietician   Results Reviewed with RN;PT/Family/Caregiver   Treatment Recommendations   Duration of treatment one week   Follow up treatments Assure diet tolerance   Dysphagia Goals Patient will tolerate recommended diet without observed clinical signs of oral/pharngeal dysphagia; Patient will tolerate advanced diet with no signs of oral or pharngeal dysphagia   Speech Therapy Prognosis   Prognosis Guarded   Prognosis Considerations Co-Morbidities   Speech/Language Pathology  Assessment    Patient Name: Mamie Mills  FFTSQ'B Date: 11/6/2017     Problem List  Patient Active Problem List   Diagnosis    Vitamin D deficiency    [facility] [address] [facility]    Sepsis [facility] [address] [facility]    Leukocytosis    Nicotine dependence    Acute tracheobronchitis    Tobacco abuse    BMI less than 19,adult    Hypoalbuminemia    Hyperbilirubinemia     Past Medical History  Past Medical History:   Diagnosis Date    Anemia  Asthma     [facility]     Heart disease     Left wrist injury     Low testosterone     [facility] [address] [facility]     Vitamin D deficiency      Past Surgical History  History reviewed  No pertinent surgical history

## 2017-11-06 NOTE — SOCIAL WORK
Spoke with Bill Esquivel at [employer] ( [phone])  Bill Esquivel is his care manager  Pt receives In Home Services through [facility]  Ashley Rahman is his personal care assistant that he lives with him  Pt also has aides 2 hours a day on Monday, Wednesday and Friday through [address]

## 2017-11-06 NOTE — PLAN OF CARE
Problem: SLP ADULT - SWALLOWING, IMPAIRED  Goal: Initial SLP swallow eval performed  Outcome: Completed Date Met: 11/06/17

## 2017-11-06 NOTE — H&P
H&P Exam - Aysha Silverio 74 y o  male MRN: [id number]    Unit/Bed#: [facility] Encounter: [id number]    Assessment:  Patient Active Problem List   Diagnosis    Vitamin D deficiency    [facility]ation [facility] [address] [facility]    Sepsis ([facility] [address] [facility]    Leukocytosis    Nicotine dependence    Acute tracheobronchitis    Tobacco abuse    BMI less than 19,adult    Hypoalbuminemia    Hyperbilirubinemia       Plan:  1)  Sepsis (Present on admission) secondary to acute tracheobronchitis/Acute tracheobronchitis/Leukocytosis/COPD with acute exacerbation:  Admit the patient to inpatient status, med/surg level of care with telemetry monitoring  The patient will require at least a 2-midnight inpatient hospitalization for work-up and treatment of the symptomatology  The patient received the initial normal saline IV fluid resuscitation per the sepsis protocol with normal saline IV fluids at 30 mL/kg  He will be continued on normal saline IV fluids at 100 mL/hr  Check a respiratory pathogen profile  Check blood cultures x 2 sets  Check urine culture  Check Streptococcus pneumoniae urinary antigen  Check Legionella urinary antigen  IV methylprednisolone  IV azithromycin  Check sputum culture  Consult pulmonology  2)  Tobacco abuse:  Nicotine patch  Smoking cessation counseling  3)  BMI less than 19/Hypoalbuminemia:  Check a prealbumin level  Nutrition consult  4)  DVT Prophylaxis:  Lovenox 40 mg SQ every 24 hours  SCD's bilaterally  History of Present Illness   The patient is a 74year-old male who presented to the emergency department with the complaints of worsening shortness of breath  The patient experiences chronic dyspnea secondary to his chronic medical condition of COPD  Over the last 24 hours, the patient's shortness of breath acutely worsened  The patient also developed a fever and a productive cough  Nothing seemed to relieve his symptoms    His shortness of breath worsened with any type of movement or exertion  No abdominal pain  No chest pain  No nausea or vomiting  In the emergency department, the patient was found to meet SIRS criteria  Review of Systems:  Per HPI, all other systems have been reviewed and were negative  Historical Information   Past Medical History:   Diagnosis Date    Anemia     Asthma     COPD (chronic obstructive pulmonary disease) (HCC)     Heart disease     Left wrist injury     Low testosterone     [facility] [address] [facility]     Vitamin D deficiency      History reviewed  No pertinent surgical history    Social History   History   Alcohol Use No     History   Drug Use No     History   Smoking Status    Heavy Tobacco Smoker    Packs/day: 1 50    Years: 59 00   Smokeless Tobacco    Never Used     Comment: 1 5 packs per day     Family History: non-contributory    Meds/Allergies   all medications and allergies reviewed  No Known Allergies    Objective   First Vitals:   Blood Pressure: 122/73 (11/05/17 2117)  Pulse: (!) 137 (11/05/17 2117)  Temperature: 100 5 °F (38 1 °C) (11/05/17 2117)  Temp Source: Temporal (11/05/17 2117)  Respirations: 18 (11/05/17 2117)  Height: 5' 6" (167 6 cm) (11/06/17 0027)  Weight - Scale: 49 3 kg (108 lb 11 oz) (11/05/17 2120)  SpO2: (!) 89 % (11/05/17 2117)    Current Vitals:   Blood Pressure: 112/61 (11/06/17 0027)  Pulse: 87 (11/06/17 0203)  Temperature: 98 °F (36 7 °C) (11/06/17 0027)  Temp Source: Temporal (11/06/17 0027)  Respirations: 20 (11/06/17 0203)  Height: 5' 6" (167 6 cm) (11/06/17 0027)  Weight - Scale: 49 3 kg (108 lb 11 oz) (11/06/17 0027)  SpO2: 95 % (11/06/17 0203)      Intake/Output Summary (Last 24 hours) at 11/06/17 0525  Last data filed at 11/06/17 0401   Gross per 24 hour   Intake             1629 ml   Output              100 ml   Net             1529 ml       Invasive Devices     Peripheral Intravenous Line            Peripheral IV 11/05/17 Right Arm less than 1 day                Physical Exam   General:  NAD, awake, alert, follows commands  HEENT:  NC/AT, mucous membranes dry  Neck:  Supple, No JVP elevation  CV:  + S1, +S2, Tachycardic, Regular rhythm  Pulm:  Decreased breath sounds bilaterally with expiratory wheezing bilaterally  Abd:  Soft, Non-tender, Non-distended  Ext:  No clubbing/cyanosis/edema      Lab Results:  Reviewed  Lab Results   Component Value Date    WBC 20 21 (H) 11/05/2017    HGB 14 9 11/05/2017    HCT 46 0 11/05/2017    MCV 93 11/05/2017     11/05/2017     Lab Results   Component Value Date    GLUCOSE 120 11/05/2017    CALCIUM 9 5 11/05/2017     11/05/2017    K 3 5 11/05/2017    CO2 30 11/05/2017    CL 97 (L) 11/05/2017    BUN 15 11/05/2017    CREATININE 0 97 11/05/2017     Lab Results   Component Value Date    ALT 19 11/05/2017    AST 19 11/05/2017    ALKPHOS 83 11/05/2017    BILITOT 1 20 (H) 11/05/2017           Code Status: Level 1 - Full Code  Advance Directive and Living Will:      Power of :    POLST:      Counseling / Coordination of Care: Total floor / unit time spent today 30 minutes

## 2017-11-06 NOTE — PHYSICAL THERAPY NOTE
PT Evaluation     11/06/17 1210   Note Type   Note type Eval/Treat   Pain Assessment   Pain Assessment No/denies pain   Pain Score No Pain   Home Living   Type of Home House   Home Layout Multi-level;Bed/bath upstairs; Able to live on main level with bedroom/bathroom  (no FLAQUITA, 14 steps w/ HR to 2nd floor, sleeps on couch 1st fl)   Home Equipment Cane   Prior Function   Level of [place] Independent with ADLs and functional mobility  ((I) ambulation with SPC)   Lives With Friend(s); Family   Receives Help From Family;Home health  (aide daily 1 hour per day)   ADL Assistance Needs assistance  (aides assists with ADL's)   IADLs Needs assistance  (friends/family perform IADL's and driving)   Comments pt doesn't drive   Restrictions/Precautions   Other Precautions Droplet precautions; Telemetry;Multiple lines;O2;Fall Risk   General   Family/Caregiver Present No   Cognition   Arousal/Participation Alert   Orientation Level Oriented to person;Oriented to place;Oriented to time   Following Commands Follows one step commands with increased time or repetition   Comments pt is Hard of hearing and requires instructed repeated at times   RLE Assessment   RLE Assessment WFL  (4+/5)   LLE Assessment   LLE Assessment WFL  (ROM WFL, 4-/5 increased tone during ambulation)   Coordination   Sensation WFL   Light Touch   RLE Light Touch Grossly intact   LLE Light Touch Grossly intact   Bed Mobility   Additional Comments Pt OOB in chair when PT entered  Returned to sitting in chair at bedside, bell in reach   Transfers   Sit to Stand 4  Minimal assistance   Additional items Assist x 1;Verbal cues;Armrests  (armrest or SPC)   Stand to Sit 4  Minimal assistance   Additional items Assist x 1;Verbal cues   Stand pivot (CGA with SPC)   Additional Comments pt ambulates with decreased coordination and increased tone L LE on lateral aspect of foot with foot positioned in inversion  Pt with one to two episodes of LOB but able to self correct  spO2 on 2L's at rest 95%  BP 98/59 and after ambulation Spo2 90-92%   Ambulation/Elevation   Gait pattern Improper Weight shift;L Foot drag;Decreased foot clearance; Short stride  (weightbears on lateral L foot)   Gait Assistance (CGA)   Assistive Device Straight cane   Distance 200ft with SPC CGA with altered gait pattern due to history of CVA with 2 episodes of LOB but able to self correct with CGA to min(A)  Balance   Static Sitting Good   Dynamic Sitting Good   Static Standing Fair  (with SPC)   Dynamic Standing Fair  (with SPC)   Ambulatory Fair  (with SPC)   Endurance Deficit   Endurance Deficit Yes   Endurance Deficit Description decreased ambulation tolerance due to fatigue and mild drop in SpO2   Activity Tolerance   Activity Tolerance Patient limited by fatigue   Assessment   Prognosis Good   Problem List Decreased strength;Decreased endurance; Impaired balance;Decreased mobility; Decreased safety awareness   Assessment Pt is a 74year old male presenting with decreased L LE strength, decreased balance endurance and functional mobility performing all transfers and ambulation at a CGA to min(A) level  Pt is in need of continued activity in PT to improve impairments and functional deficits  Pt will be safe to return home on discharge with home health care services  Goals   Patient Goals To feel better and return home   LTG Expiration Date 11/20/17   Long Term Goal #1 (I) with all bed mobility and transfers with or without quad cane   Long Term Goal #2 Pt will ambulate 300ft with quad cane or SPC (I) and will ascend/descend 11 steps with HR (S)   Plan   Treatment/Interventions Functional transfer training;LE strengthening/ROM; Elevations; Therapeutic exercise; Endurance training;Patient/family training;Bed mobility;Gait training   PT Frequency (3-5x/wk)   Recommendation   Recommendation Home PT   PT - OK to Discharge Yes  (when medically stable for discharge)   pt with SCD's on but not turned on when PT entered  SCD's reapplied and turned on with pt seated in chair at bedside with call bell in reach

## 2017-11-06 NOTE — OCCUPATIONAL THERAPY NOTE
[address] Evaluation     Patient Name: Sheri Castañeda  IBDTV'G Date: 11/6/2017  Problem List  Patient Active Problem List   Diagnosis    Vitamin D deficiency    [facility] with acute exacerbation ([facility] )    Sepsis ([facility] )    Leukocytosis    Nicotine dependence    Acute tracheobronchitis    Tobacco abuse    BMI less than 19,adult    Hypoalbuminemia    Hyperbilirubinemia     Past Medical History  Past Medical History:   Diagnosis Date    Anemia     Asthma     COPD (chronic obstructive pulmonary disease) ([facility] )     Heart disease     Left wrist injury     Low testosterone     Stroke ([facility] )     Vitamin D deficiency      Past Surgical History  History reviewed  No pertinent surgical history  11/06/17 1146   Note Type   Note type Eval/Treat   Restrictions/Precautions   Other Precautions Airborne/isolation;Contact/isolation; Bed Alarm; Chair Alarm;O2;Multiple lines; Fall Risk   Pain Assessment   Pain Assessment No/denies pain   Home Living   Type of Home House   Home Layout Two level;Stairs to enter with rails  (no FLAQUITA, bedroom 1st floor setup )   Bathroom Shower/Tub (2nd level; 14 steps w/ HR to 2nd floor)   Home Equipment Cane  (ambulates w/ [facility])   Prior Function   Level of Cotton Needs assistance with IADLs; Needs assistance with ADLs and functional mobility  (Aide daily 1 hr )   Lives With Family;Friend(s)   Receives Help From Family;Friend(s); Other (Comment)  (aide daily 1 hr )   ADL Assistance Needs assistance   IADLs Needs assistance   Psychosocial   Psychosocial (WDL) WDL   ADL   Where Assessed Edge of bed   Functional Assistance 4  Minimal Assistance   Functional Deficit Steadying;Supervision/safety   Additional Comments (Pt Min A for sit <> stand functional transfers  )   Transfers   Sit to Stand 4  Minimal assistance   Additional items Assist x 1  (use of SPC)   Stand to Sit 4  Minimal assistance   Additional items Assist x 1  (use of SPC)   Functional Mobility   Functional Mobility 4  Minimal assistance  ((CGA) w/ SPC)   Additional Comments (Functional mobility 200 ft w/ SPC)   Activity Tolerance   Activity Tolerance Patient limited by fatigue   RUE Assessment   RUE Assessment WFL  (MMT completed 3+/5 for shoulder flex/ext, elbow flex/ext)   LUE Assessment   LUE Assessment WFL  (MMT completed 3/5 for LUE shoulder flex/ext, elbow flex/ext )   Hand Function   Gross Motor Coordination Functional   Fine Motor Coordination Functional   Sensation   Light Touch No apparent deficits   Cognition   Overall Cognitive Status WFL   Arousal/Participation Responsive; Cooperative   Attention Attends with cues to redirect   Orientation Level Oriented X4   Goals   Short Term Goal #1 Pt will demonstrate strength improvement to 4-/5 for RUE for increased independence w/ ADLs  Long Term Goal #1 Pt will complete all functional transfers w/ SPV for increased independence w/ ADLs and safe return to PLOF  Long Term Goal #2 Pt will complete all functional mobility w/ SPV for increased independence w/ ADLs and safe return to PLOF  Plan   Treatment Interventions ADL retraining; Endurance training;UE strengthening/ROM   Goal Expiration Date 11/20/17   OT Frequency 3-5x/wk   Additional Treatment Session   Treatment Assessment Pt tolerated session well  Pt seated in chair next to bed at end of session w/ SCDs on and in place, chair alarm on, and call bell w/in reach  Recommendation   OT Discharge Recommendation Home with family support   Barthel Index   Feeding 5   Bathing 0   Grooming Score 0   Dressing Score 5   Bladder Score 10   Bowels Score 10   Toilet Use Score 5   Transfers (Bed/Chair) Score 10   Mobility (Level Surface) Score 10   Stairs Score 5   Barthel Index Score 60      OT eval/tx completed this date  Pt presents w/ decreased strength, ROM, and activity tolearance leading to decreased ability to safely complete ADLs   Pt would benefit from OT services at this time for increased strength, ROM, and activity tolerance for return to PLOF

## 2017-11-06 NOTE — PROGRESS NOTES
The azithromycin has / have been converted to Oral per [facility] IV-to-PO Auto-Conversion Protocol for Adults as approved by the Pharmacy and Therapeutics Committee  The patient met all eligible criteria:  3 Age = 23years old   2) Received at least one dose of the IV form   3) Receiving at least one other scheduled oral/enteral medication   4) Tolerating an oral/enteral diet   and did not have any exclusions:   1) Critical care patient   2) Active GI bleed (IF assessing H2RAs or PPIs)   3) Continuous tube feeding (IF assessing cipro, doxycycline, levofloxacin, minocycline, rifampin, or voriconazole)   4) Receiving PO vancomycin (IF assessing metronidazole)   5) Persistent nausea and/or vomiting   6) Ileus or gastrointestinal obstruction   7) Matilde/nasogastric tube set for continuous suction   8) Specific order not to automatically convert to PO (in the order's comments or if discussed in the most recent Infectious Disease or primary team's progress notes)

## 2017-11-06 NOTE — PLAN OF CARE
Nutrition/Hydration-ADULT     Nutrient/Hydration intake appropriate for improving, restoring or maintaining nutritional needs Not Progressing        Potential for Falls     Patient will remain free of falls Not Progressing        Prexisting or High Potential for Compromised Skin Integrity     Skin integrity is maintained or improved Not Progressing        RESPIRATORY - ADULT     Achieves optimal ventilation and oxygenation Not Progressing        SKIN/TISSUE INTEGRITY - ADULT     Skin integrity remains intact Not Progressing     Incision(s), wounds(s) or drain site(s) healing without S/S of infection Not Progressing     Oral mucous membranes remain intact Not Progressing

## 2017-11-06 NOTE — DISCHARGE INSTR - DIET
Recommend diet level of Dental soft (level 3) and honey thick liquids  Pt with suspect aspiration present

## 2017-11-06 NOTE — SOCIAL WORK
Met with pt to discuss role as  in helping pt to develop discharge plan and to help pt carry out their plan  Pt lives in a house with several friends  Pt has no steps on the outside  Pt has 13 steps on the inside  Pt has his bedroom on the first floor  Pt has 13 steps to 2nd floor where bathroom is located  Pt has bedside commode on first floor  Pt has a single point cane , nebulizer, bedside commode, shower chair, and 02  Pt uses 02 at 2 liters at night   Pt is unsure what company he gets his 02 from  Pt said he has an aid that comes in 1 hour a day to help bath and dress him but he is not sure what agency it is   Pt said Jo-Ann Rayshawn no longer cares for him  Pt's friend Almita Norman who lives at the house does the cooking  Pt has friends drive him to appts  Pt does not think he has ever had home care  Pt 's PCP is Anaid Wilkins   Pt uses the [employer] in [facility]  Discharge checklist discussed with patient   Will attempt to find out what agency supplies aides to her and how many hours they come into the house  Also will try to find out what company supplies 02  Pt could possibly benefit from [facility] on discharge

## 2017-11-06 NOTE — RESPIRATORY THERAPY NOTE
RT Protocol Note  Grayson Hodges 74 y o  male MRN: [id number]  Unit/Bed#: MS 0 Encounter: [id number]    Assessment    Principal Problem:    Sepsis ([facility] )  Active Problems:    Vitamin D deficiency    COPD with acute exacerbation (HCC)    Leukocytosis    Nicotine dependence    Acute tracheobronchitis    Tobacco abuse    BMI less than 19,adult    Hypoalbuminemia    Hyperbilirubinemia      Home Pulmonary Medications:  Albuterol (poor historian)    Past Medical History:   Diagnosis Date    Anemia     Asthma     COPD (chronic obstructive pulmonary disease) ([place] [address] )     Heart disease     Left wrist injury     Low testosterone     Stroke ([place] [address] )     Vitamin D deficiency      Social History     Social History    Marital status: Single     Spouse name: N/A    Number of children: N/A    Years of education: N/A     Social History Main Topics    Smoking status: Heavy Tobacco Smoker     Packs/day: 1 50     Years: 59 00    Smokeless tobacco: Never Used      Comment: 1 5 packs per day    Alcohol use No    Drug use: No    Sexual activity: Not Currently     Other Topics Concern    None     Social History Narrative    None       Subjective  Pt offers no complaints       Objective    Physical Exam:   Assessment Type: Assess only  General Appearance: Alert, Awake  Respiratory Pattern: Normal  Chest Assessment: Chest expansion symmetrical  Bilateral Breath Sounds: Diminished, Expiratory wheezes  O2 Device: 3L NC    Vitals:  Blood pressure 112/61, pulse 87, temperature 98 °F (36 7 °C), temperature source Temporal, resp  rate 20, height 5' 6" (1 676 m), weight 49 3 kg (108 lb 11 oz), SpO2 95 %        Results from last 7 days  Lab Units 11/05/17  2234   PH ART  7 356   PCO2 ART mm Hg 65 4*   PO2 ART mm Hg 19 2*   HCO3 ART mmol/L 34 8*   BASE EXC ART mmol/L 4 9   O2 CONTENT ART mL/dL 7 5*   O2 HGB, ARTERIAL % 38 9*   ABG SOURCE  Radial, Right   WICHO TEST  Yes       Imaging and other studies: I have personally reviewed pertinent reports     and I have personally reviewed pertinent films in PACS    O2 Device: 3L NC     Plan    Respiratory Plan: Mild Distress pathway, Home Bronchodilator Patient pathway

## 2017-11-07 PROBLEM — E87.6 HYPOKALEMIA: Status: ACTIVE | Noted: 2017-11-07

## 2017-11-07 PROBLEM — R13.10 DYSPHAGIA: Status: ACTIVE | Noted: 2017-11-07

## 2017-11-07 LAB
ADENOVIRUS: NOT DETECTED
ALBUMIN SERPL BCP-MCNC: 2.1 G/DL (ref 3.5–5)
ALP SERPL-CCNC: 61 U/L (ref 46–116)
ALT SERPL W P-5'-P-CCNC: 13 U/L (ref 12–78)
ANION GAP SERPL CALCULATED.3IONS-SCNC: 8 MMOL/L (ref 4–13)
AST SERPL W P-5'-P-CCNC: 13 U/L (ref 5–45)
BACTERIA UR CULT: NORMAL
BASOPHILS # BLD MANUAL: 0 THOUSAND/UL (ref 0–0.1)
BASOPHILS NFR MAR MANUAL: 0 % (ref 0–1)
BILIRUB SERPL-MCNC: 0.4 MG/DL (ref 0.2–1)
BUN SERPL-MCNC: 15 MG/DL (ref 5–25)
C PNEUM DNA SPEC QL NAA+PROBE: NOT DETECTED
CALCIUM SERPL-MCNC: 8.3 MG/DL (ref 8.3–10.1)
CHLORIDE SERPL-SCNC: 106 MMOL/L (ref 100–108)
CO2 SERPL-SCNC: 27 MMOL/L (ref 21–32)
CREAT SERPL-MCNC: 0.76 MG/DL (ref 0.6–1.3)
EOSINOPHIL # BLD MANUAL: 0 THOUSAND/UL (ref 0–0.4)
EOSINOPHIL NFR BLD MANUAL: 0 % (ref 0–6)
ERYTHROCYTE [DISTWIDTH] IN BLOOD BY AUTOMATED COUNT: 12.6 % (ref 11.6–15.1)
EST. AVERAGE GLUCOSE BLD GHB EST-MCNC: 123 MG/DL
FLUAV H1 RNA SPEC QL NAA+PROBE: NOT DETECTED
FLUAV H3 RNA SPEC QL NAA+PROBE: NOT DETECTED
FLUAV RNA SPEC QL NAA+PROBE: NOT DETECTED
FLUBV RNA SPEC QL NAA+PROBE: NOT DETECTED
GFR SERPL CREATININE-BSD FRML MDRD: 90 ML/MIN/1.73SQ M
GLUCOSE SERPL-MCNC: 159 MG/DL (ref 65–140)
HBA1C MFR BLD: 5.9 % (ref 4.2–6.3)
HBOV DNA SPEC QL NAA+PROBE: NOT DETECTED
HCOV 229E RNA SPEC QL NAA+PROBE: NOT DETECTED
HCOV HKU1 RNA SPEC QL NAA+PROBE: NOT DETECTED
HCOV NL63 RNA SPEC QL NAA+PROBE: NOT DETECTED
HCOV OC43 RNA SPEC QL NAA+PROBE: NOT DETECTED
HCT VFR BLD AUTO: 38.2 % (ref 36.5–49.3)
HGB BLD-MCNC: 12.2 G/DL (ref 12–17)
HPIV1 RNA SPEC QL NAA+PROBE: NOT DETECTED
HPIV2 RNA SPEC QL NAA+PROBE: NOT DETECTED
HPIV3 RNA SPEC QL NAA+PROBE: NOT DETECTED
HPIV4 RNA SPEC QL NAA+PROBE: NOT DETECTED
LYMPHOCYTES # BLD AUTO: 0.32 THOUSAND/UL (ref 0.6–4.47)
LYMPHOCYTES # BLD AUTO: 2 % (ref 14–44)
M PNEUMO DNA SPEC QL NAA+PROBE: NOT DETECTED
MCH RBC QN AUTO: 30 PG (ref 26.8–34.3)
MCHC RBC AUTO-ENTMCNC: 31.9 G/DL (ref 31.4–37.4)
MCV RBC AUTO: 94 FL (ref 82–98)
METAPNEUMOVIRUS: NOT DETECTED
MONOCYTES # BLD AUTO: 0.8 THOUSAND/UL (ref 0–1.22)
MONOCYTES NFR BLD: 5 % (ref 4–12)
NEUTROPHILS # BLD MANUAL: 14.83 THOUSAND/UL (ref 1.85–7.62)
NEUTS SEG NFR BLD AUTO: 93 % (ref 43–75)
PLATELET # BLD AUTO: 242 THOUSANDS/UL (ref 149–390)
PLATELET BLD QL SMEAR: ADEQUATE
PMV BLD AUTO: 10.5 FL (ref 8.9–12.7)
POTASSIUM SERPL-SCNC: 3.4 MMOL/L (ref 3.5–5.3)
PREALB SERPL-MCNC: 9.2 MG/DL (ref 18–40)
PROT SERPL-MCNC: 6.2 G/DL (ref 6.4–8.2)
RBC # BLD AUTO: 4.06 MILLION/UL (ref 3.88–5.62)
RHINOVIRUS RNA SPEC QL NAA+PROBE: NOT DETECTED
RSV A RNA SPEC QL NAA+PROBE: NOT DETECTED
RSV B RNA SPEC QL NAA+PROBE: NOT DETECTED
SODIUM SERPL-SCNC: 141 MMOL/L (ref 136–145)
T3FREE SERPL-MCNC: 1.64 PG/ML (ref 2.3–4.2)
T4 FREE SERPL-MCNC: 1.31 NG/DL (ref 0.76–1.46)
TOTAL CELLS COUNTED SPEC: 100
WBC # BLD AUTO: 15.95 THOUSAND/UL (ref 4.31–10.16)

## 2017-11-07 PROCEDURE — 85007 BL SMEAR W/DIFF WBC COUNT: CPT | Performed by: PHYSICIAN ASSISTANT

## 2017-11-07 PROCEDURE — 84439 ASSAY OF FREE THYROXINE: CPT | Performed by: PHYSICIAN ASSISTANT

## 2017-11-07 PROCEDURE — 94640 AIRWAY INHALATION TREATMENT: CPT

## 2017-11-07 PROCEDURE — 94760 N-INVAS EAR/PLS OXIMETRY 1: CPT

## 2017-11-07 PROCEDURE — 97110 THERAPEUTIC EXERCISES: CPT

## 2017-11-07 PROCEDURE — 85027 COMPLETE CBC AUTOMATED: CPT | Performed by: PHYSICIAN ASSISTANT

## 2017-11-07 PROCEDURE — 97116 GAIT TRAINING THERAPY: CPT

## 2017-11-07 PROCEDURE — 80053 COMPREHEN METABOLIC PANEL: CPT | Performed by: PHYSICIAN ASSISTANT

## 2017-11-07 PROCEDURE — 97530 THERAPEUTIC ACTIVITIES: CPT

## 2017-11-07 PROCEDURE — 92526 ORAL FUNCTION THERAPY: CPT | Performed by: SPEECH-LANGUAGE PATHOLOGIST

## 2017-11-07 PROCEDURE — 84481 FREE ASSAY (FT-3): CPT | Performed by: PHYSICIAN ASSISTANT

## 2017-11-07 RX ORDER — PREDNISONE 20 MG/1
40 TABLET ORAL DAILY
Status: DISCONTINUED | OUTPATIENT
Start: 2017-11-08 | End: 2017-11-11 | Stop reason: HOSPADM

## 2017-11-07 RX ORDER — GUAIFENESIN 600 MG
600 TABLET, EXTENDED RELEASE 12 HR ORAL EVERY 12 HOURS SCHEDULED
Status: DISCONTINUED | OUTPATIENT
Start: 2017-11-07 | End: 2017-11-11 | Stop reason: HOSPADM

## 2017-11-07 RX ORDER — POTASSIUM CHLORIDE 20 MEQ/1
40 TABLET, EXTENDED RELEASE ORAL ONCE
Status: COMPLETED | OUTPATIENT
Start: 2017-11-07 | End: 2017-11-07

## 2017-11-07 RX ADMIN — IPRATROPIUM BROMIDE 0.5 MG: 0.5 SOLUTION RESPIRATORY (INHALATION) at 07:35

## 2017-11-07 RX ADMIN — POTASSIUM CHLORIDE 40 MEQ: 1500 TABLET, EXTENDED RELEASE ORAL at 12:00

## 2017-11-07 RX ADMIN — LEVALBUTEROL 1.25 MG: 1.25 SOLUTION, CONCENTRATE RESPIRATORY (INHALATION) at 19:58

## 2017-11-07 RX ADMIN — GUAIFENESIN AND DEXTROMETHORPHAN 10 ML: 100; 10 SYRUP ORAL at 09:16

## 2017-11-07 RX ADMIN — SODIUM CHLORIDE 500 ML: 0.9 INJECTION, SOLUTION INTRAVENOUS at 12:24

## 2017-11-07 RX ADMIN — OXYCODONE HYDROCHLORIDE 5 MG: 5 TABLET ORAL at 09:09

## 2017-11-07 RX ADMIN — GUAIFENESIN 600 MG: 600 TABLET, EXTENDED RELEASE ORAL at 12:20

## 2017-11-07 RX ADMIN — SODIUM CHLORIDE 100 ML/HR: 0.9 INJECTION, SOLUTION INTRAVENOUS at 20:32

## 2017-11-07 RX ADMIN — GUAIFENESIN AND DEXTROMETHORPHAN 10 ML: 100; 10 SYRUP ORAL at 20:34

## 2017-11-07 RX ADMIN — THEOPHYLLINE ANHYDROUS 400 MG: 400 CAPSULE, EXTENDED RELEASE ORAL at 09:17

## 2017-11-07 RX ADMIN — GUAIFENESIN 600 MG: 600 TABLET, EXTENDED RELEASE ORAL at 21:37

## 2017-11-07 RX ADMIN — LEVALBUTEROL 1.25 MG: 1.25 SOLUTION, CONCENTRATE RESPIRATORY (INHALATION) at 13:47

## 2017-11-07 RX ADMIN — AZITHROMYCIN 500 MG: 250 TABLET, FILM COATED ORAL at 09:58

## 2017-11-07 RX ADMIN — ATORVASTATIN CALCIUM 80 MG: 40 TABLET, FILM COATED ORAL at 15:34

## 2017-11-07 RX ADMIN — METHYLPREDNISOLONE SODIUM SUCCINATE 40 MG: 40 INJECTION, POWDER, FOR SOLUTION INTRAMUSCULAR; INTRAVENOUS at 09:09

## 2017-11-07 RX ADMIN — OXYCODONE HYDROCHLORIDE 5 MG: 5 TABLET ORAL at 03:53

## 2017-11-07 RX ADMIN — IPRATROPIUM BROMIDE 0.5 MG: 0.5 SOLUTION RESPIRATORY (INHALATION) at 19:58

## 2017-11-07 RX ADMIN — FLUTICASONE PROPIONATE AND SALMETEROL 1 PUFF: 50; 250 POWDER RESPIRATORY (INHALATION) at 21:37

## 2017-11-07 RX ADMIN — PANTOPRAZOLE SODIUM 40 MG: 40 TABLET, DELAYED RELEASE ORAL at 05:49

## 2017-11-07 RX ADMIN — ASPIRIN 81 MG CHEWABLE TABLET 81 MG: 81 TABLET CHEWABLE at 09:09

## 2017-11-07 RX ADMIN — LEVALBUTEROL 1.25 MG: 1.25 SOLUTION, CONCENTRATE RESPIRATORY (INHALATION) at 07:35

## 2017-11-07 RX ADMIN — IPRATROPIUM BROMIDE 0.5 MG: 0.5 SOLUTION RESPIRATORY (INHALATION) at 13:47

## 2017-11-07 RX ADMIN — GUAIFENESIN AND DEXTROMETHORPHAN 10 ML: 100; 10 SYRUP ORAL at 15:37

## 2017-11-07 RX ADMIN — SODIUM CHLORIDE 100 ML/HR: 0.9 INJECTION, SOLUTION INTRAVENOUS at 07:07

## 2017-11-07 RX ADMIN — NICOTINE 1 PATCH: 21 PATCH, EXTENDED RELEASE TRANSDERMAL at 21:39

## 2017-11-07 RX ADMIN — CYANOCOBALAMIN TAB 500 MCG 250 MCG: 500 TAB at 09:09

## 2017-11-07 RX ADMIN — FLUTICASONE PROPIONATE AND SALMETEROL 1 PUFF: 50; 250 POWDER RESPIRATORY (INHALATION) at 11:00

## 2017-11-07 RX ADMIN — ENOXAPARIN SODIUM 40 MG: 40 INJECTION SUBCUTANEOUS at 01:20

## 2017-11-07 NOTE — PHYSICAL THERAPY NOTE
PT Note      11/07/17 1430   Restrictions/Precautions   Other Precautions O2;Fall Risk;Telemetry;Multiple lines   Endurance Deficit   Endurance Deficit Yes   Activity Tolerance   Activity Tolerance Patient limited by fatigue   Exercises   Hip Flexion Sitting;20 reps;AROM   Hip Abduction Sitting;20 reps;AROM   Hip Adduction Sitting;20 reps;AROM   Knee AROM Long Arc Quad Sitting;20 reps;AROM   Ankle Pumps Sitting;20 reps;AROM   Assessment   Prognosis Good   Problem List Decreased strength;Decreased endurance; Impaired balance;Decreased mobility; Decreased safety awareness   Assessment Pt  seen for therapeutic exercises as above  -119  rest breaks with each exercise  Pt  will benefit from continued PT to improve impairments and functional deficits  Plan   Treatment/Interventions Functional transfer training;LE strengthening/ROM; Therapeutic exercise; Endurance training;Bed mobility;Gait training   Progress Progressing toward goals   Recommendation   Recommendation Home PT   Pt  OOB with call bell within reach, scd's connected and turned on and alarm on at end of PT session

## 2017-11-07 NOTE — SPEECH THERAPY NOTE
Speech Language/Pathology                             SLP  Note  Patient Name: Abiodun Ortega  Today's Date: 11/7/2017     Problem List  Patient Active Problem List   Diagnosis    Vitamin D deficiency    [facility]ation [facility]    Sepsis ([facility] )    Leukocytosis    Nicotine dependence    Acute tracheobronchitis    Tobacco abuse    BMI less than 19,adult    Hypoalbuminemia    Hyperbilirubinemia     Past Medical History  Past Medical History:   Diagnosis Date    Anemia     Asthma     COPD (chronic obstructive pulmonary disease) ([facility] )     Heart disease     Left wrist injury     Low testosterone     Stroke ([facility] )     Vitamin D deficiency      Past Surgical History  History reviewed  No pertinent surgical history  Subjective:  Patient up in his chair with the end of his breakfast tray  Patient is alert and cooperative and feeding himself after nursing set him up  Objective:  Since diet level change; patient states that his drinks are better and his softer food is better  Assessment:  Patient is independent and tolerating trials of soft and some puree on tray with ST  He uses a straw and a cup with his nectar liquids without s/s  Nursing present and attests that his coughing this morning was before any meals or drinks  Patient states he didn't want regular liquids; as he says these are "so much easier for me"  Plan/Recommendations:  Patient is satisfied on current diet level of soft and nectar thick liquids  He is tolerating this level  We will discharge him on this recommendation for at home  Discussed with patient the availability of thickener once discharged  Patient is in agreement

## 2017-11-07 NOTE — PHYSICAL THERAPY NOTE
PT Treatment Note      11/07/17 1203   Restrictions/Precautions   Other Precautions (Bed Alarm; Chair Alarm;O2;Multiple lines; Fall Risk;Droplet pr)   Subjective   Subjective Agreeable to therapy   Bed Mobility   Additional Comments see OT note for bed mobility   Transfers   Sit to Stand 4  Minimal assistance   Additional items Assist x 1; Impulsive;Verbal cues   Stand to Sit 4  Minimal assistance   Additional items Assist x 1; Armrests; Verbal cues   Ambulation/Elevation   Gait pattern (Improper Weight shift;L Foot drag;Decreased foot clearance;S)   Gait Assistance (CGA and line management)   Assistive Device Straight cane   Distance 200'     Balance   Static Sitting Good   Dynamic Sitting Good   Static Standing Fair   Dynamic Standing [address]  (with SPC)   Endurance Deficit   Endurance Deficit Yes   Activity Tolerance   Activity Tolerance Patient limited by fatigue   Assessment   Prognosis Good   Problem List Decreased strength;Decreased endurance; Impaired balance;Decreased mobility; Decreased safety awareness   Assessment Pt  performing all transfers and ambulation at a CGA to min(A) level  Pt is in need of continued activity in PT to improve impairments and functional deficits  LOB x 1 requiring min x 1 to correct  SPO2 high 90's with 3 L O2, mild SOB  Pt will be safe to return home on discharge with home health care services  Plan   Treatment/Interventions Functional transfer training;LE strengthening/ROM; Therapeutic exercise; Endurance training;Bed mobility;Gait training   Recommendation   Recommendation Home PT   Pt  OOB with call bell within reach, scd's connected and turned on and alarm on at end of PT session

## 2017-11-07 NOTE — MALNUTRITION/BMI
This medical record reflects one or more clinical indicators suggestive of malnutrition and/or morbid obesity  Please indicate the one diagnosis below which you feel best reflects the clinical picture  Malnutrition Findings:   chronic illness  malnutrition of moderate degree    BMI Findings:  <19    Body mass index is 17 72 kg/m²  See Nutrition note dated 11/7/2017 for additional details  Completed nutrition assessment is viewable in the nutrition documentation

## 2017-11-07 NOTE — OCCUPATIONAL THERAPY NOTE
OT Note     11/07/17 1140   Restrictions/Precautions   Other Precautions O2;Multiple lines; Fall Risk   ADL   LB Dressing Assistance 5  Supervision/Setup   LB Dressing Deficit Setup   LB Dressing Comments Dons shoes in seated bedside chair, completes velcro fasteners without difficulty   Bed Mobility   Supine to Sit 5  Supervision   Additional items Bedrails; Increased time required   Transfers   Sit to Stand 4  Minimal assistance   Additional items Assist x 1; Impulsive   Stand to Sit 4  Minimal assistance   Additional items Assist x 1; Armrests; Verbal cues; Impulsive   Functional Mobility   Functional Mobility 4  Minimal assistance   Additional Comments Completes txfr EOB to chair at bedside   Additional items [address]   Therapeutic Excerise-Strength   UE Strength Yes   Right Upper Extremity- Strength   R Shoulder Flexion; Extension;Horizontal ABduction   R Elbow Elbow flexion;Elbow extension   R Wrist Wrist flexion;Wrist extension   R Hand (Forearm pro/supination)   R Weight/Reps/Sets 2# free wt, 3 sets/10 all movements   Activity Tolerance   Activity Tolerance Patient tolerated treatment well   Assessment   Assessment Presents supine, agreeable to participate  Completes txfr to EOB then OOB to recliner at bedside  Dons footwear  Completes RUE therex as per above  Pleasant and cooperative  SCDs applied and turned on  Call bell in reach     Recommendation   Recommendation (Continue OT services )

## 2017-11-07 NOTE — SPEECH THERAPY NOTE
Recommend discharge diet of Soft and Nectar thick liquids  Discussed with patient, Rn, and dietician  Dietician to provide can of thickener to take home

## 2017-11-07 NOTE — PLAN OF CARE
Problem: Potential for Falls  Goal: Patient will remain free of falls  INTERVENTIONS:  - Assess patient frequently for physical needs  -  Identify cognitive and physical deficits and behaviors that affect risk of falls  -  Ruby fall precautions as indicated by assessment   - Educate patient/family on patient safety including physical limitations  - Instruct patient to call for assistance with activity based on assessment  - Modify environment to reduce risk of injury  - Consider OT/PT consult to assist with strengthening/mobility   Outcome: Progressing      Problem: Prexisting or High Potential for Compromised Skin Integrity  Goal: Skin integrity is maintained or improved  INTERVENTIONS:  - Identify patients at risk for skin breakdown  - Assess and monitor skin integrity  - Assess and monitor nutrition and hydration status  - Monitor labs (i e  albumin)  - Assess for incontinence   - Turn and reposition patient  - Assist with mobility/ambulation  - Relieve pressure over bony prominences  - Avoid friction and shearing  - Provide appropriate hygiene as needed including keeping skin clean and dry  - Evaluate need for skin moisturizer/barrier cream  - Collaborate with interdisciplinary team (i e  Nutrition, Rehabilitation, etc )   - Patient/family teaching   Outcome: Progressing      Problem: Nutrition/Hydration-ADULT  Goal: Nutrient/Hydration intake appropriate for improving, restoring or maintaining nutritional needs  Monitor and assess patient's nutrition/hydration status for malnutrition (ex- brittle hair, bruises, dry skin, pale skin and conjunctiva, muscle wasting, smooth red tongue, and disorientation)  Collaborate with interdisciplinary team and initiate plan and interventions as ordered  Monitor patient's weight and dietary intake as ordered or per policy  Utilize nutrition screening tool and intervene per policy   Determine patient's food preferences and provide high-protein, high-caloric foods as appropriate       INTERVENTIONS:  - Monitor oral intake, urinary output, labs, and treatment plans  - Assess nutrition and hydration status and recommend course of action  - Evaluate amount of meals eaten  - Assist patient with eating if necessary   - Allow adequate time for meals  - Recommend/ encourage appropriate diets, oral nutritional supplements, and vitamin/mineral supplements  - Order, calculate, and assess calorie counts as needed  - Recommend, monitor, and adjust tube feedings and TPN/PPN based on assessed needs  - Assess need for intravenous fluids  - Provide specific nutrition/hydration education as appropriate  - Include patient/family/caregiver in decisions related to nutrition    Outcome: Progressing      Problem: RESPIRATORY - ADULT  Goal: Achieves optimal ventilation and oxygenation  INTERVENTIONS:  - Assess for changes in respiratory status  - Assess for changes in mentation and behavior  - Position to facilitate oxygenation and minimize respiratory effort  - Oxygen administration by appropriate delivery method based on oxygen saturation (per order) or ABGs  - Initiate smoking cessation education as indicated  - Encourage broncho-pulmonary hygiene including cough, deep breathe, Incentive Spirometry  - Assess the need for suctioning and aspirate as needed  - Assess and instruct to report SOB or any respiratory difficulty  - Respiratory Therapy support as indicated   Outcome: Progressing

## 2017-11-07 NOTE — PLAN OF CARE

## 2017-11-07 NOTE — PROGRESS NOTES
Patient seen and treated by speech therapy and RN patient status discussed   Patient to continue with nectar thick level 3 soft foods

## 2017-11-07 NOTE — PROGRESS NOTES
[address] Internal Medicine Progress Note  Patient: Cindy Dexter 74 y o  male   MRN: [id number]  PCP: Maddison Newton PA-C  Unit/Bed#: [facility] Encounter: [id number]  Date Of Visit: 11/07/17    Assessment:    Principal Problem:    Sepsis ([facility] )  Active Problems:    Vitamin D deficiency    COPD with acute exacerbation ([facility] )    Leukocytosis    Nicotine dependence    Acute tracheobronchitis    Tobacco abuse    Low TSH level    BMI less than 19,adult    Hypoalbuminemia    Hyperbilirubinemia    Hypokalemia    Dysphagia      Plan:    · Sepsis,POA secondary to acute tracheobronchitis/Acute tracheobronchitis/Leukocytosis/COPD with acute exacerbation: will discontinue IV steroids and start Prednisone 40 mg PO daily starting tomorrow  Sputum culture pending  Continue PO Azithromycin  Respiratory protocol  The patient will need outpatient follow up with Pulmonology  Leukocytosis improving  Mucinex 600 mg PO BID  · Vitamin D deficiency: the patient was started on Ergocalciferol 50,000 units PO weekly x 8 doses  The patient will need a repeat vitamin D level in 8 weeks  · Low TSH level: T3 is low at 1 64  and T4 is 1 31    · Tobacco use: continue Nicotine patch  · BMI less than 19/Hypoalbuminemia: Nutrition consulted  · Hypokalemia: replete  Repeat labs in am    · Dysphagia:  Speech consulted and change the patient's diet to dental soft with nectar thick liquids  VTE Pharmacologic Prophylaxis:   Pharmacologic: Enoxaparin (Lovenox)  Mechanical VTE Prophylaxis in Place: Yes      Discussions with Specialists or Other Care Team Provider: Nursing, CM, and attending      Time Spent for Care: 30 minutes  More than 50% of total time spent on counseling and coordination of care as described above      Current Length of Stay: 2 day(s)    Current Patient Status: Inpatient   Certification Statement: The patient will continue to require additional inpatient hospital stay due to continued need for steroids, antibiotics and nebulizer treatments      Code Status: Level 1 - Full Code      Subjective: The patient was seen and examined  The patient states his breathing has improved but continues to complain of coughing up phlegm  No acute events overnight  Objective:     Vitals:   Temp (24hrs), Av 9 °F (36 6 °C), Min:97 6 °F (36 4 °C), Max:98 3 °F (36 8 °C)    HR:  [] 88  Resp:  [18-20] 20  BP: ()/(53-67) 98/53  SpO2:  [96 %-99 %] 97 %  Body mass index is 17 72 kg/m²  Input and Output Summary (last 24 hours): Intake/Output Summary (Last 24 hours) at 17 1200  Last data filed at 17 0748   Gross per 24 hour   Intake          3308 33 ml   Output              250 ml   Net          3058 33 ml       Physical Exam:     Physical Exam   Constitutional: He is oriented to person, place, and time  He appears cachectic  He is active and cooperative  Cardiovascular: Normal rate, regular rhythm and normal heart sounds  Pulmonary/Chest: Effort normal    Breath sounds are very decreased with scattered wheezes  Abdominal: Soft  Normal appearance and bowel sounds are normal  There is no tenderness  Neurological: He is alert and oriented to person, place, and time  No cranial nerve deficit  Skin: Skin is warm, dry and intact  Nursing note and vitals reviewed  Additional Data:     Labs:      Results from last 7 days  Lab Units 17  0508  17  2130   WBC Thousand/uL 15 95*  < > 20 21*   HEMOGLOBIN g/dL 12 2  < > 14 9   HEMATOCRIT % 38 2  < > 46 0   PLATELETS Thousands/uL 242  < > 274   NEUTROS PCT %  --   --  85*   LYMPHS PCT %  --   --  4*   LYMPHO PCT % 2*  < >  --    MONOS PCT %  --   --  11   MONO PCT MAN % 5  < >  --    EOS PCT %  --   --  0   EOSINO PCT MANUAL % 0  < >  --    < > = values in this interval not displayed      Results from last 7 days  Lab Units 17  0508   SODIUM mmol/L 141   POTASSIUM mmol/L 3 4*   CHLORIDE mmol/L 106   CO2 mmol/L 27   BUN mg/dL 15   CREATININE mg/dL 0 76   CALCIUM mg/dL 8 3   TOTAL PROTEIN g/dL 6 2*   BILIRUBIN TOTAL mg/dL 0 40   ALK PHOS U/L 61   ALT U/L 13   AST U/L 13   GLUCOSE RANDOM mg/dL 159*       Results from last 7 days  Lab Units 11/05/17  2130   INR  1 22*       * I Have Reviewed All Lab Data Listed Above  * Additional Pertinent Lab Tests Reviewed: All Labs Within Last 24 Hours Reviewed    Imaging:    Imaging Reports Reviewed Today Include: none  Imaging Personally Reviewed by Myself Includes:  none    Recent Cultures (last 7 days):       Results from last 7 days  Lab Units 11/06/17  0604 11/06/17  0415 11/05/17  2136   BLOOD CULTURE   --   --  No Growth at 24 hrs  No Growth at 24 hrs  URINE CULTURE   --  <10,000 cfu/ml   --    LEGIONELLA URINARY ANTIGEN  Negative  --   --        Last 24 Hours Medication List:     aspirin 81 mg Oral Daily   atorvastatin 80 mg Oral Daily With Dinner   azithromycin 500 mg Oral Q24H   cyanocobalamin 250 mcg Oral Daily   enoxaparin 40 mg Subcutaneous Q24H   ergocalciferol 50,000 Units Oral Weekly   fluticasone-salmeterol 1 puff Inhalation Q12H LOC   guaiFENesin 600 mg Oral Q12H LOC   ipratropium 0 5 mg Nebulization TID   levalbuterol 1 25 mg Nebulization TID   methylPREDNISolone sodium succinate 40 mg Intravenous Q12H LOC   nicotine 1 patch Transdermal Daily   pantoprazole 40 mg Oral Early Morning   potassium chloride 40 mEq Oral Once   sodium chloride 500 mL Intravenous Once   theophylline 400 mg Oral Daily        Today, Patient Was Seen By: Batsheva Jasso PA-C    ** Please Note: This note has been constructed using a voice recognition system   **

## 2017-11-07 NOTE — PLAN OF CARE
Problem: SLP ADULT - SWALLOWING, IMPAIRED  Goal: Advance to least restrictive diet without signs or symptoms of aspiration for planned discharge setting  See evaluation for individualized goals  Patient will:    1  Tolerate Soft diet and nectar thick liquids with no S/S of oral/pharyngeal dysphia across meals           Outcome: Completed Date Met: 11/07/17

## 2017-11-08 ENCOUNTER — APPOINTMENT (INPATIENT)
Dept: ULTRASOUND IMAGING | Facility: HOSPITAL | Age: 72
DRG: 871 | End: 2017-11-08
Payer: MEDICARE

## 2017-11-08 PROBLEM — R31.29 MICROSCOPIC HEMATURIA: Status: ACTIVE | Noted: 2017-11-08

## 2017-11-08 PROBLEM — E44.0 MODERATE PROTEIN-CALORIE MALNUTRITION (HCC): Status: ACTIVE | Noted: 2017-11-08

## 2017-11-08 PROBLEM — R79.89 LOW SERUM TRIIODOTHYRONINE (T3): Status: ACTIVE | Noted: 2017-11-08

## 2017-11-08 LAB
ANION GAP SERPL CALCULATED.3IONS-SCNC: 4 MMOL/L (ref 4–13)
BACTERIA UR QL AUTO: ABNORMAL /HPF
BASOPHILS # BLD AUTO: 0 THOUSANDS/ΜL (ref 0–0.1)
BASOPHILS NFR BLD AUTO: 0 % (ref 0–1)
BILIRUB UR QL STRIP: NEGATIVE
BUN SERPL-MCNC: 11 MG/DL (ref 5–25)
CALCIUM SERPL-MCNC: 8.3 MG/DL (ref 8.3–10.1)
CHLORIDE SERPL-SCNC: 109 MMOL/L (ref 100–108)
CLARITY UR: CLEAR
CO2 SERPL-SCNC: 30 MMOL/L (ref 21–32)
COLOR UR: ABNORMAL
CREAT SERPL-MCNC: 0.58 MG/DL (ref 0.6–1.3)
EOSINOPHIL # BLD AUTO: 0 THOUSAND/ΜL (ref 0–0.61)
EOSINOPHIL NFR BLD AUTO: 0 % (ref 0–6)
ERYTHROCYTE [DISTWIDTH] IN BLOOD BY AUTOMATED COUNT: 13 % (ref 11.6–15.1)
GFR SERPL CREATININE-BSD FRML MDRD: 100 ML/MIN/1.73SQ M
GLUCOSE SERPL-MCNC: 110 MG/DL (ref 65–140)
GLUCOSE UR STRIP-MCNC: NEGATIVE MG/DL
HCT VFR BLD AUTO: 33.6 % (ref 36.5–49.3)
HGB BLD-MCNC: 10.6 G/DL (ref 12–17)
HGB UR QL STRIP.AUTO: ABNORMAL
KETONES UR STRIP-MCNC: NEGATIVE MG/DL
LEUKOCYTE ESTERASE UR QL STRIP: NEGATIVE
LYMPHOCYTES # BLD AUTO: 1.3 THOUSANDS/ΜL (ref 0.6–4.47)
LYMPHOCYTES NFR BLD AUTO: 9 % (ref 14–44)
MCH RBC QN AUTO: 30.1 PG (ref 26.8–34.3)
MCHC RBC AUTO-ENTMCNC: 31.5 G/DL (ref 31.4–37.4)
MCV RBC AUTO: 96 FL (ref 82–98)
MONOCYTES # BLD AUTO: 1.04 THOUSAND/ΜL (ref 0.17–1.22)
MONOCYTES NFR BLD AUTO: 8 % (ref 4–12)
NEUTROPHILS # BLD AUTO: 11.48 THOUSANDS/ΜL (ref 1.85–7.62)
NEUTS SEG NFR BLD AUTO: 83 % (ref 43–75)
NITRITE UR QL STRIP: NEGATIVE
NON-SQ EPI CELLS URNS QL MICRO: ABNORMAL /HPF
PH UR STRIP.AUTO: 5.5 [PH] (ref 4.5–8)
PLATELET # BLD AUTO: 230 THOUSANDS/UL (ref 149–390)
PMV BLD AUTO: 10.2 FL (ref 8.9–12.7)
POTASSIUM SERPL-SCNC: 3.6 MMOL/L (ref 3.5–5.3)
PROT UR STRIP-MCNC: NEGATIVE MG/DL
RBC # BLD AUTO: 3.52 MILLION/UL (ref 3.88–5.62)
RBC #/AREA URNS AUTO: ABNORMAL /HPF
SODIUM SERPL-SCNC: 143 MMOL/L (ref 136–145)
SP GR UR STRIP.AUTO: 1.01 (ref 1–1.03)
UROBILINOGEN UR QL STRIP.AUTO: 0.2 E.U./DL
WBC # BLD AUTO: 13.82 THOUSAND/UL (ref 4.31–10.16)
WBC #/AREA URNS AUTO: ABNORMAL /HPF

## 2017-11-08 PROCEDURE — 97116 GAIT TRAINING THERAPY: CPT

## 2017-11-08 PROCEDURE — 87086 URINE CULTURE/COLONY COUNT: CPT | Performed by: INTERNAL MEDICINE

## 2017-11-08 PROCEDURE — 81001 URINALYSIS AUTO W/SCOPE: CPT | Performed by: INTERNAL MEDICINE

## 2017-11-08 PROCEDURE — 76770 US EXAM ABDO BACK WALL COMP: CPT

## 2017-11-08 PROCEDURE — 97530 THERAPEUTIC ACTIVITIES: CPT

## 2017-11-08 PROCEDURE — 97110 THERAPEUTIC EXERCISES: CPT

## 2017-11-08 PROCEDURE — 85025 COMPLETE CBC W/AUTO DIFF WBC: CPT | Performed by: PHYSICIAN ASSISTANT

## 2017-11-08 PROCEDURE — 94640 AIRWAY INHALATION TREATMENT: CPT

## 2017-11-08 PROCEDURE — 94762 N-INVAS EAR/PLS OXIMTRY CONT: CPT

## 2017-11-08 PROCEDURE — 80048 BASIC METABOLIC PNL TOTAL CA: CPT | Performed by: PHYSICIAN ASSISTANT

## 2017-11-08 PROCEDURE — 94760 N-INVAS EAR/PLS OXIMETRY 1: CPT

## 2017-11-08 RX ORDER — POTASSIUM CHLORIDE 20 MEQ/1
40 TABLET, EXTENDED RELEASE ORAL ONCE
Status: COMPLETED | OUTPATIENT
Start: 2017-11-08 | End: 2017-11-08

## 2017-11-08 RX ADMIN — GUAIFENESIN AND DEXTROMETHORPHAN 10 ML: 100; 10 SYRUP ORAL at 08:36

## 2017-11-08 RX ADMIN — ONDANSETRON 4 MG: 2 INJECTION INTRAMUSCULAR; INTRAVENOUS at 08:36

## 2017-11-08 RX ADMIN — LEVALBUTEROL 1.25 MG: 1.25 SOLUTION, CONCENTRATE RESPIRATORY (INHALATION) at 07:31

## 2017-11-08 RX ADMIN — NICOTINE 1 PATCH: 21 PATCH, EXTENDED RELEASE TRANSDERMAL at 21:36

## 2017-11-08 RX ADMIN — FLUTICASONE PROPIONATE AND SALMETEROL 1 PUFF: 50; 250 POWDER RESPIRATORY (INHALATION) at 21:42

## 2017-11-08 RX ADMIN — CYANOCOBALAMIN TAB 500 MCG 250 MCG: 500 TAB at 08:37

## 2017-11-08 RX ADMIN — SODIUM CHLORIDE 100 ML/HR: 0.9 INJECTION, SOLUTION INTRAVENOUS at 06:39

## 2017-11-08 RX ADMIN — IPRATROPIUM BROMIDE 0.5 MG: 0.5 SOLUTION RESPIRATORY (INHALATION) at 21:00

## 2017-11-08 RX ADMIN — GUAIFENESIN AND DEXTROMETHORPHAN 10 ML: 100; 10 SYRUP ORAL at 23:19

## 2017-11-08 RX ADMIN — ASPIRIN 81 MG CHEWABLE TABLET 81 MG: 81 TABLET CHEWABLE at 08:36

## 2017-11-08 RX ADMIN — ENOXAPARIN SODIUM 40 MG: 40 INJECTION SUBCUTANEOUS at 00:32

## 2017-11-08 RX ADMIN — GUAIFENESIN 600 MG: 600 TABLET, EXTENDED RELEASE ORAL at 08:37

## 2017-11-08 RX ADMIN — AZITHROMYCIN 500 MG: 250 TABLET, FILM COATED ORAL at 12:00

## 2017-11-08 RX ADMIN — PREDNISONE 40 MG: 20 TABLET ORAL at 08:38

## 2017-11-08 RX ADMIN — POTASSIUM CHLORIDE 40 MEQ: 1500 TABLET, EXTENDED RELEASE ORAL at 12:04

## 2017-11-08 RX ADMIN — LEVALBUTEROL 1.25 MG: 1.25 SOLUTION, CONCENTRATE RESPIRATORY (INHALATION) at 21:00

## 2017-11-08 RX ADMIN — LEVALBUTEROL 1.25 MG: 1.25 SOLUTION, CONCENTRATE RESPIRATORY (INHALATION) at 15:21

## 2017-11-08 RX ADMIN — FLUTICASONE PROPIONATE AND SALMETEROL 1 PUFF: 50; 250 POWDER RESPIRATORY (INHALATION) at 08:47

## 2017-11-08 RX ADMIN — IPRATROPIUM BROMIDE 0.5 MG: 0.5 SOLUTION RESPIRATORY (INHALATION) at 07:31

## 2017-11-08 RX ADMIN — PANTOPRAZOLE SODIUM 40 MG: 40 TABLET, DELAYED RELEASE ORAL at 05:00

## 2017-11-08 RX ADMIN — SODIUM CHLORIDE 100 ML/HR: 0.9 INJECTION, SOLUTION INTRAVENOUS at 17:56

## 2017-11-08 RX ADMIN — THEOPHYLLINE ANHYDROUS 400 MG: 400 CAPSULE, EXTENDED RELEASE ORAL at 08:47

## 2017-11-08 RX ADMIN — ONDANSETRON 4 MG: 2 INJECTION INTRAMUSCULAR; INTRAVENOUS at 01:48

## 2017-11-08 RX ADMIN — IPRATROPIUM BROMIDE 0.5 MG: 0.5 SOLUTION RESPIRATORY (INHALATION) at 15:21

## 2017-11-08 RX ADMIN — GUAIFENESIN 600 MG: 600 TABLET, EXTENDED RELEASE ORAL at 21:36

## 2017-11-08 RX ADMIN — ATORVASTATIN CALCIUM 80 MG: 40 TABLET, FILM COATED ORAL at 17:53

## 2017-11-08 NOTE — OCCUPATIONAL THERAPY NOTE
OT Note     11/08/17 1036   Restrictions/Precautions   Other Precautions Fall Risk;Telemetry;O2;Multiple lines   Bed Mobility   Supine to Sit 6  Modified independent   Additional items Bedrails   Transfers   Sit to Stand 5  Supervision   Stand to Sit 5  Supervision   Additional items Armrests   Functional Mobility   Functional Mobility 5  Supervision   Additional Comments Completes txfr EOB to recliner at opposite side of bed  Sits chair at bedside  Additional items [facility]   Therapeutic Excerise-Strength   UE Strength Yes   Right Upper Extremity- Strength   R Shoulder Flexion; Extension;ABduction   R Elbow Elbow flexion;Elbow extension   R Wrist Wrist flexion;Wrist extension   R Hand (Forearm pro/supination)   R Weight/Reps/Sets 2# free wt; all movements completed 3 sets/10   Activity Tolerance   Activity Tolerance Patient tolerated treatment well   Assessment   Assessment Presents supine, appears to be asleep; difficult to wake  Agreeable to participate  Txfred supine to EOB, then to bedside chair at opposite side of bed  Completes RUE therex as above  A M  meal placed on over the bed table  All items set up  Self feeds all items without difficulty  Call bell and phone in reach  SCDs applied and turned on  Tab alarm appliied     Recommendation   Recommendation (Continue OT services )

## 2017-11-08 NOTE — PROGRESS NOTES
Progress Note - Lucina Ruiz 74 y o  male MRN: [id number]    Unit/Bed#: MS 0 Encounter: [id number]      Assessment:  Patient Active Problem List   Diagnosis    Vitamin D deficiency    COPD with acute exacerbation ([facility] )    Sepsis ([facility] )    Leukocytosis    Nicotine dependence    Acute tracheobronchitis    Tobacco abuse    Low TSH level    Low serum prealbumin    BMI less than 19,adult    Malnutrition of moderate degree (HCC)    Hypoalbuminemia    Hyperbilirubinemia    Hypokalemia    Dysphagia    Low serum triiodothyronine (T3)    Microscopic hematuria    Moderate protein-calorie malnutrition (HCC)         Plan:  1)  Sepsis (present on admission) secondary to acute tracheobronchitis/COPD with acute exacerbation:  Continue the respiratory protocol  Follow culture results  Continue azithromycin  Continue prednisone  I appreciate Pulmonology's input  Continue PT and OT  Maintain continuous pulse oximetry  Incentive spirometry 10 times per hour while awake  Attempt to wean supplemental oxygen  With his persistent dyspnea, he is not safe for discharge to home at this time  2)  Moderate protein-calorie malnutrition/Malnutrition of moderate degree/BMI =18 65/Hypoalbuminemia/Low serum pre-albumin level:  Nutrition is following the patient  3)  Dysphagia:  Diet per speech therapy  Aspiration precautions  4)  Tobacco abuse:  Continue the nicotine patch  Smoking cessation counseling  5)  Low TSH/Low T3/Normal free T4:  Recheck TSH, serum T3 level, and free T4 level in 1 week as an outpatient  6)  Hypokalemia:  Improved  7)  Microscopic hematuria:  His urine culture is mixed contaminants  I will repeat urinalysis and urine culture today, 11/08/2017  Check a retroperitoneal ultrasound  He will need outpatient follow-up with Urology for a probable cystoscopy to rule-out bladder cancer with his tobacco abuse history  8)  Vitamin D deficiency:  Ergocalciferol supplementation  Recheck a Vitamin D 25-OH level in 2 months with his PCP  Subjective:   Patient seen and examined  Patient complains of dyspnea, worsened with exertion  The patient also complains of nausea  Objective:     Vitals: Blood pressure 113/68, pulse (!) 115, temperature 98 1 °F (36 7 °C), temperature source Temporal, resp  rate 20, height 5' 6" (1 676 m), weight 52 4 kg (115 lb 8 3 oz), SpO2 97 %  ,Body mass index is 18 65 kg/m²    Weight (last 2 days)     Date/Time   Weight    11/08/17 0600  52 4 (115 52)    11/07/17 0549  49 8 (109 79)    11/06/17 1159  49 3 (108 69)    11/06/17 0558  49 3 (108 69)    11/06/17 0027  49 3 (108 69)              Intake/Output Summary (Last 24 hours) at 11/08/17 1020  Last data filed at 11/08/17 0639   Gross per 24 hour   Intake          3138 33 ml   Output              400 ml   Net          2738 33 ml       Physical Exam: General:  Mild respiratory distress, awake, alert, conversational dyspnea is present  HEENT:  NC/AT, mucous membranes moist  Neck:  Supple, No JVP elevation  CV:  + S1, + S2, Tachycardic, Regular rhythm  Pulm:  Expiratory wheezing bilaterally with decreased breath sounds bilaterally  Abd:  Soft, Non-tender, Non-distended  Ext:  No clubbing/cyanosis/edema    Scheduled Meds:  aspirin 81 mg Oral Daily   atorvastatin 80 mg Oral Daily With Dinner   azithromycin 500 mg Oral Q24H   cyanocobalamin 250 mcg Oral Daily   enoxaparin 40 mg Subcutaneous Q24H   ergocalciferol 50,000 Units Oral Weekly   fluticasone-salmeterol 1 puff Inhalation Q12H LOC   guaiFENesin 600 mg Oral Q12H LOC   ipratropium 0 5 mg Nebulization TID   levalbuterol 1 25 mg Nebulization TID   nicotine 1 patch Transdermal Daily   pantoprazole 40 mg Oral Early Morning   predniSONE 40 mg Oral Daily   theophylline 400 mg Oral Daily     Continuous Infusions:  sodium chloride 100 mL/hr Last Rate: 100 mL/hr (11/08/17 0639)     PRN Meds:   acetaminophen    dextromethorphan-guaiFENesin    ondansetron   oxyCODONE    sodium chloride (PF)      Invasive Devices     Peripheral Intravenous Line            Peripheral IV 11/05/17 Right Arm 2 days                  Results from last 7 days  Lab Units 11/08/17  0455 11/07/17  0508 11/06/17  0520 11/05/17  2130   WBC Thousand/uL 13 82* 15 95* 14 01* 20 21*   HEMOGLOBIN g/dL 10 6* 12 2 12 8 14 9   HEMATOCRIT % 33 6* 38 2 39 0 46 0   PLATELETS Thousands/uL 230 242 203 274   NEUTROS PCT % 83*  --   --  85*   MONOS PCT % 8  --   --  11   MONO PCT MAN %  --  5 1*  --          Results from last 7 days  Lab Units 11/08/17  0455 11/07/17  0508 11/06/17  0520 11/05/17  2130   SODIUM mmol/L 143 141 140 136   POTASSIUM mmol/L 3 6 3 4* 3 8 3 5   CHLORIDE mmol/L 109* 106 102 97*   CO2 mmol/L 30 27 24 30   BUN mg/dL 11 15 13 15   CREATININE mg/dL 0 58* 0 76 0 80 0 97   CALCIUM mg/dL 8 3 8 3 8 4 9 5   TOTAL PROTEIN g/dL  --  6 2* 6 5 8 4*   BILIRUBIN TOTAL mg/dL  --  0 40 1 00 1 20*   ALK PHOS U/L  --  61 63 83   ALT U/L  --  13 9* 19   AST U/L  --  13 14 19   GLUCOSE RANDOM mg/dL 110 159* 136 120       Lab Results   Component Value Date    CKTOTAL 51 11/06/2017    TROPONINI <0 02 11/06/2017       Lab Results   Component Value Date    INR 1 22 (H) 11/05/2017    INR 1 14 10/04/2016    INR 1 33 (H) 05/04/2016    PROTIME 15 3 (H) 11/05/2017    PROTIME 14 2 10/04/2016    PROTIME 16 1 (H) 05/04/2016                 Lab, Imaging and other studies: I have personally reviewed pertinent reports      VTE Pharmacologic Prophylaxis: Enoxaparin (Lovenox)  VTE Mechanical Prophylaxis: sequential compression device

## 2017-11-08 NOTE — ED PROCEDURE NOTE
PROCEDURE  ECG 12 Lead Documentation  Date/Time: 11/5/2017 9:39 PM  Performed by: Clarita Carrera by: Nicole Romano     Indications / Diagnosis:  Sob  ECG reviewed by me, the ED Provider: yes    Patient location:  ED  Interpretation:     Interpretation: abnormal    Rate:     ECG rate:  133    ECG rate assessment: tachycardic    Rhythm:     Rhythm: sinus rhythm    Ectopy:     Ectopy: none    Conduction:     Conduction: normal    ST segments:     ST segments:  Normal  T waves:     T waves: normal

## 2017-11-08 NOTE — PHYSICAL THERAPY NOTE
PT Treatment note      11/08/17 1300   Pain Assessment   Pain Score No Pain   Restrictions/Precautions   Other Precautions (Bed Alarm; Chair Alarm;O2;Multiple lines; Fall Risk;)   Bed Mobility   Supine to Sit 6  Modified independent   Additional items Bedrails;Verbal cues; Increased time required   Transfers   Sit to Stand 4  Minimal assistance   Additional items Assist x 1;Bedrails;Armrests; Verbal cues   Stand to Sit 5  Supervision   Additional items Verbal cues;Armrests   Stand pivot (CGA with SPC)   Ambulation/Elevation   Gait pattern (Improper Weight shift;L Foot drag;Decreased foot clearance;S)   Gait Assistance (CGA)   Additional items Verbal cues   Assistive Device Straight cane   Distance 200'   Balance   Static Sitting Good   Dynamic Sitting Good   Static Standing Fair   Dynamic Standing Fair   Ambulatory Fair  (with SPC)   Endurance Deficit   Endurance Deficit Yes   Endurance Deficit Description decreased ambulation tolerance due to fatigue and elevated -134    Activity Tolerance   Activity Tolerance Patient limited by fatigue   Exercises   Hip Flexion 20 reps   Hip Abduction 20 reps   Hip Adduction 20 reps   Knee AROM Long Arc Quad 20 reps   Ankle Pumps 20 reps   Assessment   Prognosis Good   Problem List Decreased strength;Decreased endurance; Impaired balance;Decreased mobility; Decreased safety awareness   Assessment Pt  performing all transfers and ambulation at a CGA to min(A) level  Verbal cues for safety  No LOB  HR elevated as noted above with activity  Mild SOB and coughing throughout session  Pt is in need of continued activity in PT to improve impairments and functional deficits  Plan   Treatment/Interventions Functional transfer training;LE strengthening/ROM; Therapeutic exercise; Endurance training;Bed mobility;Gait training   Progress Progressing toward goals   Recommendation   Recommendation Home PT   Pt   OOB with call bell within reach, scd's connected and turned on at end of PT session

## 2017-11-09 ENCOUNTER — APPOINTMENT (INPATIENT)
Dept: CT IMAGING | Facility: HOSPITAL | Age: 72
DRG: 871 | End: 2017-11-09
Payer: MEDICARE

## 2017-11-09 ENCOUNTER — APPOINTMENT (INPATIENT)
Dept: RADIOLOGY | Facility: HOSPITAL | Age: 72
DRG: 871 | End: 2017-11-09
Payer: MEDICARE

## 2017-11-09 PROBLEM — N28.1 RENAL CYST, RIGHT: Status: ACTIVE | Noted: 2017-11-09

## 2017-11-09 PROBLEM — M79.671 PAIN OF RIGHT HEEL: Status: ACTIVE | Noted: 2017-11-09

## 2017-11-09 PROBLEM — E83.39 HYPOPHOSPHATEMIA: Status: ACTIVE | Noted: 2017-11-09

## 2017-11-09 PROBLEM — R93.89 ABNORMAL CHEST X-RAY: Status: ACTIVE | Noted: 2017-11-09

## 2017-11-09 LAB
ALBUMIN SERPL BCP-MCNC: 2 G/DL (ref 3.5–5)
ALP SERPL-CCNC: 52 U/L (ref 46–116)
ALT SERPL W P-5'-P-CCNC: 16 U/L (ref 12–78)
ANION GAP SERPL CALCULATED.3IONS-SCNC: 4 MMOL/L (ref 4–13)
AST SERPL W P-5'-P-CCNC: 13 U/L (ref 5–45)
BACTERIA SPT RESP CULT: ABNORMAL
BACTERIA SPT RESP CULT: ABNORMAL
BACTERIA UR CULT: NORMAL
BASOPHILS # BLD AUTO: 0.01 THOUSANDS/ΜL (ref 0–0.1)
BASOPHILS NFR BLD AUTO: 0 % (ref 0–1)
BILIRUB SERPL-MCNC: 0.3 MG/DL (ref 0.2–1)
BUN SERPL-MCNC: 8 MG/DL (ref 5–25)
CALCIUM SERPL-MCNC: 8.1 MG/DL (ref 8.3–10.1)
CHLORIDE SERPL-SCNC: 107 MMOL/L (ref 100–108)
CO2 SERPL-SCNC: 32 MMOL/L (ref 21–32)
CREAT SERPL-MCNC: 0.66 MG/DL (ref 0.6–1.3)
EOSINOPHIL # BLD AUTO: 0.01 THOUSAND/ΜL (ref 0–0.61)
EOSINOPHIL NFR BLD AUTO: 0 % (ref 0–6)
ERYTHROCYTE [DISTWIDTH] IN BLOOD BY AUTOMATED COUNT: 13.1 % (ref 11.6–15.1)
GFR SERPL CREATININE-BSD FRML MDRD: 95 ML/MIN/1.73SQ M
GLUCOSE SERPL-MCNC: 107 MG/DL (ref 65–140)
GRAM STN SPEC: ABNORMAL
HCT VFR BLD AUTO: 34.5 % (ref 36.5–49.3)
HGB BLD-MCNC: 10.9 G/DL (ref 12–17)
LACTATE SERPL-SCNC: 1.5 MMOL/L (ref 0.5–2)
LYMPHOCYTES # BLD AUTO: 1.31 THOUSANDS/ΜL (ref 0.6–4.47)
LYMPHOCYTES NFR BLD AUTO: 14 % (ref 14–44)
MAGNESIUM SERPL-MCNC: 1.6 MG/DL (ref 1.6–2.6)
MCH RBC QN AUTO: 30.4 PG (ref 26.8–34.3)
MCHC RBC AUTO-ENTMCNC: 31.6 G/DL (ref 31.4–37.4)
MCV RBC AUTO: 96 FL (ref 82–98)
MONOCYTES # BLD AUTO: 0.92 THOUSAND/ΜL (ref 0.17–1.22)
MONOCYTES NFR BLD AUTO: 10 % (ref 4–12)
NEUTROPHILS # BLD AUTO: 7.04 THOUSANDS/ΜL (ref 1.85–7.62)
NEUTS SEG NFR BLD AUTO: 76 % (ref 43–75)
PHOSPHATE SERPL-MCNC: 1.9 MG/DL (ref 2.3–4.1)
PLATELET # BLD AUTO: 221 THOUSANDS/UL (ref 149–390)
PMV BLD AUTO: 9.9 FL (ref 8.9–12.7)
POTASSIUM SERPL-SCNC: 3.7 MMOL/L (ref 3.5–5.3)
PROT SERPL-MCNC: 5.3 G/DL (ref 6.4–8.2)
RBC # BLD AUTO: 3.58 MILLION/UL (ref 3.88–5.62)
SODIUM SERPL-SCNC: 143 MMOL/L (ref 136–145)
WBC # BLD AUTO: 9.29 THOUSAND/UL (ref 4.31–10.16)

## 2017-11-09 PROCEDURE — 71010 HB CHEST X-RAY 1 VIEW FRONTAL (PORTABLE): CPT

## 2017-11-09 PROCEDURE — 84100 ASSAY OF PHOSPHORUS: CPT | Performed by: INTERNAL MEDICINE

## 2017-11-09 PROCEDURE — 80053 COMPREHEN METABOLIC PANEL: CPT | Performed by: INTERNAL MEDICINE

## 2017-11-09 PROCEDURE — 97110 THERAPEUTIC EXERCISES: CPT

## 2017-11-09 PROCEDURE — 85025 COMPLETE CBC W/AUTO DIFF WBC: CPT | Performed by: INTERNAL MEDICINE

## 2017-11-09 PROCEDURE — 97116 GAIT TRAINING THERAPY: CPT

## 2017-11-09 PROCEDURE — 97530 THERAPEUTIC ACTIVITIES: CPT

## 2017-11-09 PROCEDURE — 94762 N-INVAS EAR/PLS OXIMTRY CONT: CPT

## 2017-11-09 PROCEDURE — 83605 ASSAY OF LACTIC ACID: CPT | Performed by: INTERNAL MEDICINE

## 2017-11-09 PROCEDURE — 94760 N-INVAS EAR/PLS OXIMETRY 1: CPT

## 2017-11-09 PROCEDURE — 73650 X-RAY EXAM OF HEEL: CPT

## 2017-11-09 PROCEDURE — 71275 CT ANGIOGRAPHY CHEST: CPT

## 2017-11-09 PROCEDURE — 94640 AIRWAY INHALATION TREATMENT: CPT

## 2017-11-09 PROCEDURE — 83735 ASSAY OF MAGNESIUM: CPT | Performed by: INTERNAL MEDICINE

## 2017-11-09 RX ORDER — MAGNESIUM SULFATE HEPTAHYDRATE 40 MG/ML
2 INJECTION, SOLUTION INTRAVENOUS ONCE
Status: COMPLETED | OUTPATIENT
Start: 2017-11-09 | End: 2017-11-09

## 2017-11-09 RX ORDER — POTASSIUM CHLORIDE 20 MEQ/1
20 TABLET, EXTENDED RELEASE ORAL ONCE
Status: COMPLETED | OUTPATIENT
Start: 2017-11-09 | End: 2017-11-09

## 2017-11-09 RX ADMIN — AZITHROMYCIN 500 MG: 250 TABLET, FILM COATED ORAL at 09:27

## 2017-11-09 RX ADMIN — IPRATROPIUM BROMIDE 0.5 MG: 0.5 SOLUTION RESPIRATORY (INHALATION) at 07:41

## 2017-11-09 RX ADMIN — PREDNISONE 40 MG: 20 TABLET ORAL at 09:25

## 2017-11-09 RX ADMIN — MAGNESIUM SULFATE IN WATER 2 G: 40 INJECTION, SOLUTION INTRAVENOUS at 14:01

## 2017-11-09 RX ADMIN — CYANOCOBALAMIN TAB 500 MCG 250 MCG: 500 TAB at 09:26

## 2017-11-09 RX ADMIN — ACETAMINOPHEN 650 MG: 325 TABLET, FILM COATED ORAL at 09:41

## 2017-11-09 RX ADMIN — ENOXAPARIN SODIUM 40 MG: 40 INJECTION SUBCUTANEOUS at 01:59

## 2017-11-09 RX ADMIN — LEVALBUTEROL 1.25 MG: 1.25 SOLUTION, CONCENTRATE RESPIRATORY (INHALATION) at 20:25

## 2017-11-09 RX ADMIN — LEVALBUTEROL 1.25 MG: 1.25 SOLUTION, CONCENTRATE RESPIRATORY (INHALATION) at 13:23

## 2017-11-09 RX ADMIN — IPRATROPIUM BROMIDE 0.5 MG: 0.5 SOLUTION RESPIRATORY (INHALATION) at 20:25

## 2017-11-09 RX ADMIN — THEOPHYLLINE ANHYDROUS 400 MG: 400 CAPSULE, EXTENDED RELEASE ORAL at 09:30

## 2017-11-09 RX ADMIN — ATORVASTATIN CALCIUM 80 MG: 40 TABLET, FILM COATED ORAL at 16:42

## 2017-11-09 RX ADMIN — NICOTINE 1 PATCH: 21 PATCH, EXTENDED RELEASE TRANSDERMAL at 22:14

## 2017-11-09 RX ADMIN — IPRATROPIUM BROMIDE 0.5 MG: 0.5 SOLUTION RESPIRATORY (INHALATION) at 13:23

## 2017-11-09 RX ADMIN — GUAIFENESIN 600 MG: 600 TABLET, EXTENDED RELEASE ORAL at 22:10

## 2017-11-09 RX ADMIN — GUAIFENESIN AND DEXTROMETHORPHAN 10 ML: 100; 10 SYRUP ORAL at 12:49

## 2017-11-09 RX ADMIN — PANTOPRAZOLE SODIUM 40 MG: 40 TABLET, DELAYED RELEASE ORAL at 09:27

## 2017-11-09 RX ADMIN — LEVALBUTEROL 1.25 MG: 1.25 SOLUTION, CONCENTRATE RESPIRATORY (INHALATION) at 07:41

## 2017-11-09 RX ADMIN — CEFTRIAXONE 1000 MG: 1 INJECTION, POWDER, FOR SOLUTION INTRAMUSCULAR; INTRAVENOUS at 12:42

## 2017-11-09 RX ADMIN — GUAIFENESIN AND DEXTROMETHORPHAN 10 ML: 100; 10 SYRUP ORAL at 06:51

## 2017-11-09 RX ADMIN — FLUTICASONE PROPIONATE AND SALMETEROL 1 PUFF: 50; 250 POWDER RESPIRATORY (INHALATION) at 09:30

## 2017-11-09 RX ADMIN — DIBASIC SODIUM PHOSPHATE, MONOBASIC POTASSIUM PHOSPHATE AND MONOBASIC SODIUM PHOSPHATE 2 TABLET: 852; 155; 130 TABLET ORAL at 12:40

## 2017-11-09 RX ADMIN — FLUTICASONE PROPIONATE AND SALMETEROL 1 PUFF: 50; 250 POWDER RESPIRATORY (INHALATION) at 22:11

## 2017-11-09 RX ADMIN — GUAIFENESIN 600 MG: 600 TABLET, EXTENDED RELEASE ORAL at 09:27

## 2017-11-09 RX ADMIN — IOHEXOL 85 ML: 350 INJECTION, SOLUTION INTRAVENOUS at 14:20

## 2017-11-09 RX ADMIN — ASPIRIN 81 MG CHEWABLE TABLET 81 MG: 81 TABLET CHEWABLE at 09:26

## 2017-11-09 RX ADMIN — POTASSIUM CHLORIDE 20 MEQ: 1500 TABLET, EXTENDED RELEASE ORAL at 12:40

## 2017-11-09 RX ADMIN — SODIUM CHLORIDE 100 ML/HR: 0.9 INJECTION, SOLUTION INTRAVENOUS at 04:10

## 2017-11-09 NOTE — PROGRESS NOTES
Progress Note - Ade Sanchez 74 y o  male MRN: [id number]    Unit/Bed#: 276-82 Encounter: [id number]      Assessment:  Patient Active Problem List   Diagnosis    Vitamin D deficiency    COPD [facility] acute exacerbation [facility]    Anemia    Sepsis ([facility] )    Leukocytosis    Nicotine dependence    Acute tracheobronchitis    Tobacco abuse    Low TSH level    Low serum prealbumin    BMI less than 19,adult    Malnutrition of moderate degree (HCC)    Hypoalbuminemia    Hyperbilirubinemia    Hypokalemia    Dysphagia    Low serum triiodothyronine (T3)    Microscopic hematuria    Moderate protein-calorie malnutrition (HCC)    Pain of right heel    Hypophosphatemia    Abnormal chest x-ray         Plan:  1)  Sepsis (present on admission) secondary to acute tracheobronchitis/COPD with acute exacerbation/Abnormal chest x-ray:  Continue the respiratory protocol  Follow culture results  Continue prednisone  I appreciate Pulmonology's input  Continue PT and OT  Maintain continuous pulse oximetry  Incentive spirometry 10 times per hour while awake  Attempt to wean supplemental oxygen  A repeat portable chest x-ray was completed today, 11/09/2017, with following results:  FINDINGS:          Cardiac silhouette is prominent accentuated by rotation and portable AP technique  Aortic calcification is present      New left basilar airspace consolidation      Left costophrenic angle obscured      No large pleural effusion, pneumothorax, or lisbeth pulmonary edema      Biapical pleural-parenchymal scarring      Asymmetric nodular density projecting over the left 1st rib costochondral junction attributed to osseous overlap and calcification      Hyperlucent changes upper lungs suggestive of COPD      Multilevel thoracic spondylosis      IMPRESSION:     New left basilar airspace disease may represent aspiration and/or pneumonia in the appropriate clinical scenario      I will add IV ceftriaxone to cover for community-acquired pneumonia pathogens and continue azithromycin  I will check a CTA of the chest/PE protocol to evaluate the patient for an infiltrate as well as to evaluate the patient for a pulmonary embolism  Decrease normal saline IV fluids to 50 mL/hour  2)  Moderate protein-calorie malnutrition/Malnutrition of moderate degree/BMI =18 65/Hypoalbuminemia/Low serum pre-albumin level:  Nutrition is following the patient  3)  Dysphagia:  Diet per speech therapy  I will have speech therapy re-evaluate the patient today, 11/09/2017, with the findings of possible aspiration pneumonia on chest x-ray  Aspiration precautions  4)  Tobacco abuse:  Continue the nicotine patch  Smoking cessation counseling  5)  Low TSH/Low T3/Normal free T4:  Recheck TSH, serum T3 level, and free T4 level in 1 week as an outpatient  6)  Hypokalemia:  Improved  Continue to replete for goal potassium level of at least 4  Follow the patient's magnesium level  7)  Microscopic hematuria/Right renal cyst:  His urine culture is mixed contaminants  The urine culture was repeated on 11/08/2017 with results pending at this time  An ultrasound of the kidneys and bladder was completed on 11/08/2017 with the following results:   FINDINGS:     KIDNEYS:  Symmetric and normal size      Right kidney:  11 0 x 4 4 cm  Normal echogenicity and contour  No suspicious masses detected  10 mm right renal cyst is noted  No hydronephrosis  No shadowing calculi  No perinephric fluid collections      Left kidney:  12 0 x 5 1 cm  Normal echogenicity and contour  No suspicious masses detected  No hydronephrosis  No shadowing calculi  No perinephric fluid collections      URETERS:  Nonvisualized      BLADDER:   Normally distended  No focal thickening or mass lesions  Bilateral ureteral jets detected              IMPRESSION:     Right renal cyst, otherwise unremarkable study       He will need outpatient follow-up with Urology for a probable cystoscopy to rule-out bladder cancer with his tobacco abuse history  8)  Vitamin D deficiency:  Ergocalciferol supplementation  Recheck a Vitamin D 25-OH level in 2 months with his PCP  9)  Right heel pain:  Right heel x-ray completed today, 11/09/2017, showed no acute osseous abnormality with mild diffuse osteopenia  10)  Hypophosphatemia:  Replete with p o  phosphorus supplementation  11)  Anemia:  Check an iron panel  Check a vitamin B12 level and folate level  Check the patient's stool for occult blood x 3 sets  He will likely need an outpatient colonoscopy  Subjective:   Patient seen and examined  Patient still complains of shortness of breath, worsened with exertion  He also complains of severe right heel pain  Objective:     Vitals: Blood pressure 130/69, pulse 81, temperature 98 4 °F (36 9 °C), temperature source Temporal, resp  rate 18, height 5' 6" (1 676 m), weight 54 3 kg (119 lb 11 4 oz), SpO2 94 %  ,Body mass index is 19 32 kg/m²    Weight (last 2 days)     Date/Time   Weight    11/09/17 0548  54 3 (119 71)    11/08/17 0600  52 4 (115 52)    11/07/17 0549  49 8 (109 79)              Intake/Output Summary (Last 24 hours) at 11/09/17 1156  Last data filed at 11/09/17 0900   Gross per 24 hour   Intake             2320 ml   Output              925 ml   Net             1395 ml       Physical Exam: General:  NAD, awake, alert, conversational dyspnea is still present  HEENT:  NC/AT, mucous membranes moist  Neck:  Supple, No JVP elevation  CV:  + S1, + S2, Tachycardic, Regular rhythm  Pulm:  Bibasilar crackles  Abd:  Soft, Non-tender, Non-distended  Ext:  No clubbing/cyanosis/edema    Scheduled Meds:    aspirin 81 mg Oral Daily   atorvastatin 80 mg Oral Daily With Dinner   azithromycin 500 mg Oral Q24H   cefTRIAXone 1,000 mg Intravenous Q24H   cyanocobalamin 250 mcg Oral Daily   enoxaparin 40 mg Subcutaneous Q24H   ergocalciferol 50,000 Units Oral Weekly fluticasone-salmeterol 1 puff Inhalation Q12H [facility]   guaiFENesin 600 mg Oral Q12H LOC   ipratropium 0 5 mg Nebulization TID   levalbuterol 1 25 mg Nebulization TID   magnesium sulfate 2 g Intravenous Once   nicotine 1 patch Transdermal Daily   pantoprazole 40 mg Oral Early Morning   potassium chloride 20 mEq Oral Once   predniSONE 40 mg Oral Daily   theophylline 400 mg Oral Daily     Continuous Infusions:    sodium chloride 50 mL/hr Last Rate: 100 mL/hr (11/09/17 0410)     PRN Meds:   acetaminophen    dextromethorphan-guaiFENesin    ondansetron    oxyCODONE    sodium chloride (PF)      Invasive Devices     Peripheral Intravenous Line            Peripheral IV 11/05/17 Right Arm 3 days                  Results from last 7 days  Lab Units 11/09/17 0448 11/08/17 0455 11/07/17  0508  11/05/17  2130   WBC Thousand/uL 9 29 13 82* 15 95*  < > 20 21*   HEMOGLOBIN g/dL 10 9* 10 6* 12 2  < > 14 9   HEMATOCRIT % 34 5* 33 6* 38 2  < > 46 0   PLATELETS Thousands/uL 221 230 242  < > 274   NEUTROS PCT % 76* 83*  --   --  85*   MONOS PCT % 10 8  --   --  11   MONO PCT MAN %  --   --  5  < >  --    < > = values in this interval not displayed        Results from last 7 days  Lab Units 11/09/17 0448 11/08/17 0455 11/07/17  0508 11/06/17  0520   SODIUM mmol/L 143 143 141 140   POTASSIUM mmol/L 3 7 3 6 3 4* 3 8   CHLORIDE mmol/L 107 109* 106 102   CO2 mmol/L 32 30 27 24   BUN mg/dL 8 11 15 13   CREATININE mg/dL 0 66 0 58* 0 76 0 80   CALCIUM mg/dL 8 1* 8 3 8 3 8 4   TOTAL PROTEIN g/dL 5 3*  --  6 2* 6 5   BILIRUBIN TOTAL mg/dL 0 30  --  0 40 1 00   ALK PHOS U/L 52  --  61 63   ALT U/L 16  --  13 9*   AST U/L 13  --  13 14   GLUCOSE RANDOM mg/dL 107 110 159* 136       Lab Results   Component Value Date    CKTOTAL 51 11/06/2017    TROPONINI <0 02 11/06/2017       Lab Results   Component Value Date    INR 1 22 (H) 11/05/2017    INR 1 14 10/04/2016    INR 1 33 (H) 05/04/2016    PROTIME 15 3 (H) 11/05/2017    PROTIME 14 2 10/04/2016 PROTIME 16 1 (H) 05/04/2016                 Lab, Imaging and other studies: I have personally reviewed pertinent reports      VTE Pharmacologic Prophylaxis: Enoxaparin (Lovenox)  VTE Mechanical Prophylaxis: sequential compression device

## 2017-11-09 NOTE — PROGRESS NOTES
CTA of the chest/PE protocol was completed with the following results:  FINDINGS:     PULMONARY ARTERIAL TREE:  No pulmonary embolus is seen       LUNGS:  Emphysema  Atelectasis in the base of the left lower lobe  Coexisting pneumonia cannot be excluded  Mild subsegmental atelectasis in the base of the right lower lobe  6 mm solid nodule in the anterior segment of the right upper lobe      PLEURA:  Bilateral pleural effusions, small on the right and moderate in size on the left      HEART/AORTA:  Mild cardiomegaly  Pericardium unremarkable  Aorta normal in caliber  No dissection      MEDIASTINUM AND TOBI: No lymphadenopathy or mass  Esophagus unremarkable  Trachea and main stem bronchi normal      CHEST WALL AND LOWER NECK:       Normal      VISUALIZED STRUCTURES IN THE UPPER ABDOMEN:  Unremarkable      OSSEOUS STRUCTURES:  No acute fracture or destructive osseous lesion      IMPRESSION:     1  No evidence of pulmonary embolus  2   Emphysema  3   Bilateral pleural effusions, small on the right and moderate in size on the left  4   Compressive atelectasis in the base of the left lower lobe due to the adjacent pleural effusion  A coexisting pneumonia cannot be excluded  I will discontinue the IV fluids  Check an echocardiogram   Check a pro-BN  P level  He may require IV diuresis  The patient should be doing incentive spirometry 10 times per hour while awake for the atelectasis

## 2017-11-09 NOTE — CASE MANAGEMENT
Continued Stay Review    Date: 11/9/17    Vital Signs: /69   Pulse 81   Temp 98 4 °F (36 9 °C) (Temporal)   Resp 18   Ht 5' 6" (1 676 m)   Wt 54 3 kg (119 lb 11 4 oz)   SpO2 94%   BMI 19 32 kg/m²     Medications:   Scheduled Meds:   aspirin 81 mg Oral Daily   atorvastatin 80 mg Oral Daily With Dinner   azithromycin 500 mg Oral Q24H   cyanocobalamin 250 mcg Oral Daily   enoxaparin 40 mg Subcutaneous Q24H   ergocalciferol 50,000 Units Oral Weekly   fluticasone-salmeterol 1 puff Inhalation Q12H LOC   guaiFENesin 600 mg Oral Q12H LOC   ipratropium 0 5 mg Nebulization TID   levalbuterol 1 25 mg Nebulization TID   nicotine 1 patch Transdermal Daily   pantoprazole 40 mg Oral Early Morning   predniSONE 40 mg Oral Daily   theophylline 400 mg Oral Daily     Continuous Infusions:   sodium chloride 100 mL/hr Last Rate: 100 mL/hr (11/09/17 0410)     PRN Meds:   acetaminophen    dextromethorphan-guaiFENesin    ondansetron    oxyCODONE    sodium chloride (PF)  Abnormal Labs/Diagnostic Results:   HGB 10 9 CA 8 1 TPROT 5 3 ALB 2 0 PHOS 1 9   CXR=New left basilar airspace disease may represent aspiration and/or pneumonia in the appropriate clinical scenario  R HEEL XRAY=No acute osseous abnormality  Age/Sex: 74 y o  male  Assessment/Plan:   Subjective:   Patient seen and examined  Patient still complains of shortness of breath, worsened with exertion  He also complains of severe right heel pain  1)  Sepsis (present on admission) secondary to acute tracheobronchitis/COPD with acute exacerbation/Abnormal chest x-ray:  Continue the respiratory protocol  Follow culture results  Continue prednisone  I appreciate Pulmonology's input  Continue PT and OT  Maintain continuous pulse oximetry  Incentive spirometry 10 times per hour while awake  Attempt to wean supplemental oxygen    A repeat portable chest x-ray was completed today, 11/09/2017  I will add IV ceftriaxone to cover for community-acquired pneumonia pathogens and continue azithromycin  I will check a CTA of the chest/PE protocol to evaluate the patient for an infiltrate as well as to evaluate the patient for a pulmonary embolism  Decrease normal saline IV fluids to 50 mL/hour  2)  Moderate protein-calorie malnutrition/Malnutrition of moderate degree/BMI =18 65/Hypoalbuminemia/Low serum pre-albumin level:  Nutrition is following the patient  3)  Dysphagia:  Diet per speech therapy  I will have speech therapy re-evaluate the patient today, 11/09/2017, with the findings of possible aspiration pneumonia on chest x-ray  Aspiration precautions  4)  Tobacco abuse:  Continue the nicotine patch  Smoking cessation counseling  5)  Low TSH/Low T3/Normal free T4:  Recheck TSH, serum T3 level, and free T4 level in 1 week as an outpatient  6)  Hypokalemia:  Improved  Continue to replete for goal potassium level of at least 4  Follow the patient's magnesium level  7)  Microscopic hematuria/Right renal cyst:  His urine culture is mixed contaminants  The urine culture was repeated on 11/08/2017 with results pending at this time  An ultrasound of the kidneys and bladder was completed on 11/08/2017  He will need outpatient follow-up with Urology for a probable cystoscopy to rule-out bladder cancer with his tobacco abuse history  8)  Vitamin D deficiency:  Ergocalciferol supplementation  Recheck a Vitamin D 25-OH level in 2 months with his PCP  9)  Right heel pain:  Right heel x-ray completed today, 11/09/2017, showed no acute osseous abnormality with mild diffuse osteopenia  10)  Hypophosphatemia:  Replete with p o  phosphorus supplementation  11)  Anemia:  Check an iron panel  Check a vitamin B12 level and folate level  Check the patient's stool for occult blood x 3 sets  He will likely need an outpatient colonoscopy       Discharge Plan: HOME

## 2017-11-09 NOTE — PHYSICAL THERAPY NOTE
PT treatment Note      11/09/17 1100   Restrictions/Precautions   Other Precautions (O2;Fall Risk;Multiple lines;Hard of hearing)   Subjective   Subjective States he is tired  Bed Mobility   Additional Comments OOB in chair at start of PT session   Transfers   Sit to Stand 5  Supervision   Additional items Armrests   Stand to Sit 5  Supervision   Additional items Armrests   Ambulation/Elevation   Gait pattern (Improper Weight shift;L Foot drag;Decreased foot clearance;S)   Gait Assistance (CGA)   Assistive Device Straight cane   Distance 200' SpO2 mid 90's, -118 with 2 L O2 mild SOB   Balance   Static Sitting Good   Dynamic Sitting Good   Static Standing Fair   Dynamic Standing Fair   Ambulatory Fair  (with SPC)   Endurance Deficit   Endurance Deficit Yes   Activity Tolerance   Activity Tolerance Patient limited by fatigue   Exercises   Hip Flexion 20 reps   Hip Abduction 20 reps   Hip Adduction 20 reps   Knee AROM Long Arc Quad 20 reps   Ankle Pumps 20 reps   Assessment   Prognosis Good   Problem List Decreased strength;Decreased endurance; Impaired balance;Decreased mobility   Assessment Pt  performing all transfers and ambulation at a (S/CGA) level  Verbal cues for safety  No LOB  HR elevated as noted above with activity  Mild SOB  Pt is in need of continued activity in PT to improve impairments and functional deficits  Plan   Treatment/Interventions Functional transfer training;LE strengthening/ROM; Therapeutic exercise; Endurance training;Bed mobility;Gait training   Progress Progressing toward goals   Recommendation   Recommendation Home PT   Pt  OOB with call bell within reach, scd's connected and turned on and alarm on at end of PT session

## 2017-11-09 NOTE — PROGRESS NOTES
Progress Note - Pulmonary   Seun Landaverde 74 y o  male MRN: [id number]  Unit/Bed#: 417-01 Encounter: [id number]      Assessment/Plan:  · COPD exacerbation - transitioned to prednisone  Taper by 10 mg every 3 days  Continue nebs 3 x daily and Advair BID  Resume combivent and Dulera on D/C  · Acute tracheobronchitis vs early pneumonia - abx per SLIM  · Small bilateral pleural effusions - f/u BNP, echo  Effusions too small to warrant thoracentesis  · Tobacco abuse - smoking cessation, NRT    Subjective:   Still with SOB, cough    Objective:     Vitals: Blood pressure 130/69, pulse 81, temperature 98 4 °F (36 9 °C), temperature source Temporal, resp  rate 18, height 5' 6" (1 676 m), weight 54 3 kg (119 lb 11 4 oz), SpO2 95 %  , 2L, Body mass index is 19 32 kg/m²  Intake/Output Summary (Last 24 hours) at 11/09/17 1557  Last data filed at 11/09/17 1246   Gross per 24 hour   Intake             2320 ml   Output             1325 ml   Net              995 ml     Physical Exam:      General:  Awake, alert, no distress, sitting in chair   HEENT: PERRL, EOMI, moist mucosa    Heart:  Regular rate and rhythm, no murmur   Lungs: decreased at bases, few scattered rhonchi and scant wheeze   Abdomen: Soft, nontender, normal bowel sounds   Extremities: No clubbing, cyanosis or edema    Labs: I have personally reviewed pertinent lab results        Results from last 7 days  Lab Units 11/09/17  0448 11/08/17  0455 11/07/17  0508   WBC Thousand/uL 9 29 13 82* 15 95*   HEMOGLOBIN g/dL 10 9* 10 6* 12 2   HEMATOCRIT % 34 5* 33 6* 38 2   PLATELETS Thousands/uL 221 230 242           Results from last 7 days  Lab Units 11/09/17  0448 11/08/17  0455 11/07/17  0508 11/06/17  0520   SODIUM mmol/L 143 143 141 140   POTASSIUM mmol/L 3 7 3 6 3 4* 3 8   CHLORIDE mmol/L 107 109* 106 102   CO2 mmol/L 32 30 27 24   BUN mg/dL 8 11 15 13   CREATININE mg/dL 0 66 0 58* 0 76 0 80   CALCIUM mg/dL 8 1* 8 3 8 3 8 4   TOTAL PROTEIN g/dL 5 3*  --  6 2* 6 5 BILIRUBIN TOTAL mg/dL 0 30  --  0 40 1 00   ALK PHOS U/L 52  --  61 63   ALT U/L 16  --  13 9*   AST U/L 13  --  13 14   GLUCOSE RANDOM mg/dL 107 110 159* 136       Results from last 7 days  Lab Units 11/05/17  2130   INR  1 22*   PTT seconds 43*       Results from last 7 days  Lab Units 11/09/17  0448 11/06/17  0520   MAGNESIUM mg/dL 1 6 1 8     Imaging and other studies: I have personally reviewed pertinent films in PACS Chest Ct from today shows small B/L (L>R) pleural effusions with mild compressive atelectasis, No PE, 6 mm RUL nodule

## 2017-11-09 NOTE — OCCUPATIONAL THERAPY NOTE
OT Note     11/09/17 1008   Restrictions/Precautions   Other Precautions O2;Fall Risk;Multiple lines;Hard of hearing   Bed Mobility   Supine to Sit 6  Modified independent   Additional items Bedrails   Transfers   Sit to Stand 5  Supervision   Additional items Bedrails   Stand to Sit 5  Supervision   Additional items Armrests   Functional Mobility   Functional Mobility 5  Supervision   Additional Comments Completes txfr EOB to recliner at bedside   Additional items [facility]   Therapeutic Excerise-Strength   UE Strength Yes   Right Upper Extremity- Strength   R Shoulder Flexion; Extension;Horizontal ABduction   R Elbow Elbow flexion;Elbow extension   R Wrist Wrist flexion;Wrist extension   R Hand (Forearm pro/supination)   R Weight/Reps/Sets 3# free wt  3 sets/10   Activity Tolerance   Activity Tolerance Patient tolerated treatment well   Assessment   Assessment Presents supine  Agreeable to OOB to recliner  Txfred same with SPC  Completes RUE therrex as above  Tolerates increased resistance without c/o  Remains recliner at bedside following tx session  Chair alarm applied and turned on, SCDs applied and turned on  Call bell and phone in reach     Recommendation   Recommendation (Continue OT services )

## 2017-11-10 ENCOUNTER — APPOINTMENT (INPATIENT)
Dept: NON INVASIVE DIAGNOSTICS | Facility: HOSPITAL | Age: 72
DRG: 871 | End: 2017-11-10
Payer: MEDICARE

## 2017-11-10 LAB
ALBUMIN SERPL BCP-MCNC: 2.3 G/DL (ref 3.5–5)
ALP SERPL-CCNC: 54 U/L (ref 46–116)
ALT SERPL W P-5'-P-CCNC: 26 U/L (ref 12–78)
ANION GAP SERPL CALCULATED.3IONS-SCNC: 4 MMOL/L (ref 4–13)
AST SERPL W P-5'-P-CCNC: 20 U/L (ref 5–45)
BASOPHILS # BLD AUTO: 0.01 THOUSANDS/ΜL (ref 0–0.1)
BASOPHILS NFR BLD AUTO: 0 % (ref 0–1)
BILIRUB SERPL-MCNC: 0.3 MG/DL (ref 0.2–1)
BUN SERPL-MCNC: 10 MG/DL (ref 5–25)
CA-I BLD-SCNC: 1.19 MMOL/L (ref 1.12–1.32)
CALCIUM SERPL-MCNC: 8.7 MG/DL (ref 8.3–10.1)
CHLORIDE SERPL-SCNC: 103 MMOL/L (ref 100–108)
CO2 SERPL-SCNC: 35 MMOL/L (ref 21–32)
CREAT SERPL-MCNC: 0.69 MG/DL (ref 0.6–1.3)
EOSINOPHIL # BLD AUTO: 0.01 THOUSAND/ΜL (ref 0–0.61)
EOSINOPHIL NFR BLD AUTO: 0 % (ref 0–6)
ERYTHROCYTE [DISTWIDTH] IN BLOOD BY AUTOMATED COUNT: 13 % (ref 11.6–15.1)
FERRITIN SERPL-MCNC: 475 NG/ML (ref 8–388)
FOLATE SERPL-MCNC: 2.8 NG/ML (ref 3.1–17.5)
GFR SERPL CREATININE-BSD FRML MDRD: 94 ML/MIN/1.73SQ M
GLUCOSE SERPL-MCNC: 101 MG/DL (ref 65–140)
HCT VFR BLD AUTO: 39 % (ref 36.5–49.3)
HGB BLD-MCNC: 12.4 G/DL (ref 12–17)
IRON SATN MFR SERPL: 46 %
IRON SERPL-MCNC: 66 UG/DL (ref 65–175)
LACTATE SERPL-SCNC: 1.5 MMOL/L (ref 0.5–2)
LYMPHOCYTES # BLD AUTO: 1.47 THOUSANDS/ΜL (ref 0.6–4.47)
LYMPHOCYTES NFR BLD AUTO: 14 % (ref 14–44)
MAGNESIUM SERPL-MCNC: 1.9 MG/DL (ref 1.6–2.6)
MCH RBC QN AUTO: 30.2 PG (ref 26.8–34.3)
MCHC RBC AUTO-ENTMCNC: 31.8 G/DL (ref 31.4–37.4)
MCV RBC AUTO: 95 FL (ref 82–98)
MONOCYTES # BLD AUTO: 0.86 THOUSAND/ΜL (ref 0.17–1.22)
MONOCYTES NFR BLD AUTO: 8 % (ref 4–12)
NEUTROPHILS # BLD AUTO: 8.17 THOUSANDS/ΜL (ref 1.85–7.62)
NEUTS SEG NFR BLD AUTO: 78 % (ref 43–75)
NT-PROBNP SERPL-MCNC: 1260 PG/ML
PHOSPHATE SERPL-MCNC: 2.9 MG/DL (ref 2.3–4.1)
PLATELET # BLD AUTO: 240 THOUSANDS/UL (ref 149–390)
PMV BLD AUTO: 9.8 FL (ref 8.9–12.7)
POTASSIUM SERPL-SCNC: 4.4 MMOL/L (ref 3.5–5.3)
PROT SERPL-MCNC: 5.6 G/DL (ref 6.4–8.2)
RBC # BLD AUTO: 4.1 MILLION/UL (ref 3.88–5.62)
SODIUM SERPL-SCNC: 142 MMOL/L (ref 136–145)
TIBC SERPL-MCNC: 145 UG/DL (ref 250–450)
TROPONIN I SERPL-MCNC: <0.02 NG/ML
VIT B12 SERPL-MCNC: 460 PG/ML (ref 100–900)
WBC # BLD AUTO: 10.52 THOUSAND/UL (ref 4.31–10.16)

## 2017-11-10 PROCEDURE — 83735 ASSAY OF MAGNESIUM: CPT | Performed by: INTERNAL MEDICINE

## 2017-11-10 PROCEDURE — 82607 VITAMIN B-12: CPT | Performed by: INTERNAL MEDICINE

## 2017-11-10 PROCEDURE — 83550 IRON BINDING TEST: CPT | Performed by: INTERNAL MEDICINE

## 2017-11-10 PROCEDURE — 97110 THERAPEUTIC EXERCISES: CPT

## 2017-11-10 PROCEDURE — 84484 ASSAY OF TROPONIN QUANT: CPT | Performed by: INTERNAL MEDICINE

## 2017-11-10 PROCEDURE — 97116 GAIT TRAINING THERAPY: CPT

## 2017-11-10 PROCEDURE — 94760 N-INVAS EAR/PLS OXIMETRY 1: CPT

## 2017-11-10 PROCEDURE — 80053 COMPREHEN METABOLIC PANEL: CPT | Performed by: INTERNAL MEDICINE

## 2017-11-10 PROCEDURE — 83605 ASSAY OF LACTIC ACID: CPT | Performed by: INTERNAL MEDICINE

## 2017-11-10 PROCEDURE — 93306 TTE W/DOPPLER COMPLETE: CPT

## 2017-11-10 PROCEDURE — 82746 ASSAY OF FOLIC ACID SERUM: CPT | Performed by: INTERNAL MEDICINE

## 2017-11-10 PROCEDURE — 82330 ASSAY OF CALCIUM: CPT | Performed by: INTERNAL MEDICINE

## 2017-11-10 PROCEDURE — 92610 EVALUATE SWALLOWING FUNCTION: CPT

## 2017-11-10 PROCEDURE — 83880 ASSAY OF NATRIURETIC PEPTIDE: CPT | Performed by: INTERNAL MEDICINE

## 2017-11-10 PROCEDURE — 94640 AIRWAY INHALATION TREATMENT: CPT

## 2017-11-10 PROCEDURE — 82728 ASSAY OF FERRITIN: CPT | Performed by: INTERNAL MEDICINE

## 2017-11-10 PROCEDURE — 94762 N-INVAS EAR/PLS OXIMTRY CONT: CPT

## 2017-11-10 PROCEDURE — 84100 ASSAY OF PHOSPHORUS: CPT | Performed by: INTERNAL MEDICINE

## 2017-11-10 PROCEDURE — 83540 ASSAY OF IRON: CPT | Performed by: INTERNAL MEDICINE

## 2017-11-10 PROCEDURE — 85025 COMPLETE CBC W/AUTO DIFF WBC: CPT | Performed by: INTERNAL MEDICINE

## 2017-11-10 RX ADMIN — PREDNISONE 40 MG: 20 TABLET ORAL at 08:38

## 2017-11-10 RX ADMIN — GUAIFENESIN 600 MG: 600 TABLET, EXTENDED RELEASE ORAL at 22:31

## 2017-11-10 RX ADMIN — OXYCODONE HYDROCHLORIDE 5 MG: 5 TABLET ORAL at 08:38

## 2017-11-10 RX ADMIN — CEFTRIAXONE 1000 MG: 1 INJECTION, POWDER, FOR SOLUTION INTRAMUSCULAR; INTRAVENOUS at 15:16

## 2017-11-10 RX ADMIN — GUAIFENESIN AND DEXTROMETHORPHAN 10 ML: 100; 10 SYRUP ORAL at 09:14

## 2017-11-10 RX ADMIN — OXYCODONE HYDROCHLORIDE 5 MG: 5 TABLET ORAL at 16:36

## 2017-11-10 RX ADMIN — IPRATROPIUM BROMIDE 0.5 MG: 0.5 SOLUTION RESPIRATORY (INHALATION) at 13:14

## 2017-11-10 RX ADMIN — CYANOCOBALAMIN TAB 500 MCG 250 MCG: 500 TAB at 08:38

## 2017-11-10 RX ADMIN — LEVALBUTEROL 1.25 MG: 1.25 SOLUTION, CONCENTRATE RESPIRATORY (INHALATION) at 07:33

## 2017-11-10 RX ADMIN — ATORVASTATIN CALCIUM 80 MG: 40 TABLET, FILM COATED ORAL at 16:36

## 2017-11-10 RX ADMIN — GUAIFENESIN AND DEXTROMETHORPHAN 10 ML: 100; 10 SYRUP ORAL at 01:58

## 2017-11-10 RX ADMIN — LEVALBUTEROL 1.25 MG: 1.25 SOLUTION, CONCENTRATE RESPIRATORY (INHALATION) at 19:41

## 2017-11-10 RX ADMIN — LEVALBUTEROL 1.25 MG: 1.25 SOLUTION, CONCENTRATE RESPIRATORY (INHALATION) at 13:14

## 2017-11-10 RX ADMIN — NICOTINE 1 PATCH: 21 PATCH, EXTENDED RELEASE TRANSDERMAL at 22:33

## 2017-11-10 RX ADMIN — AZITHROMYCIN 500 MG: 250 TABLET, FILM COATED ORAL at 11:55

## 2017-11-10 RX ADMIN — OXYCODONE HYDROCHLORIDE 5 MG: 5 TABLET ORAL at 12:38

## 2017-11-10 RX ADMIN — ASPIRIN 81 MG CHEWABLE TABLET 81 MG: 81 TABLET CHEWABLE at 08:37

## 2017-11-10 RX ADMIN — IPRATROPIUM BROMIDE 0.5 MG: 0.5 SOLUTION RESPIRATORY (INHALATION) at 07:33

## 2017-11-10 RX ADMIN — PANTOPRAZOLE SODIUM 40 MG: 40 TABLET, DELAYED RELEASE ORAL at 05:53

## 2017-11-10 RX ADMIN — IPRATROPIUM BROMIDE 0.5 MG: 0.5 SOLUTION RESPIRATORY (INHALATION) at 19:40

## 2017-11-10 RX ADMIN — FLUTICASONE PROPIONATE AND SALMETEROL 1 PUFF: 50; 250 POWDER RESPIRATORY (INHALATION) at 08:37

## 2017-11-10 RX ADMIN — FLUTICASONE PROPIONATE AND SALMETEROL 1 PUFF: 50; 250 POWDER RESPIRATORY (INHALATION) at 22:31

## 2017-11-10 RX ADMIN — THEOPHYLLINE ANHYDROUS 400 MG: 400 CAPSULE, EXTENDED RELEASE ORAL at 08:38

## 2017-11-10 RX ADMIN — ENOXAPARIN SODIUM 40 MG: 40 INJECTION SUBCUTANEOUS at 00:25

## 2017-11-10 RX ADMIN — GUAIFENESIN 600 MG: 600 TABLET, EXTENDED RELEASE ORAL at 08:38

## 2017-11-10 NOTE — PHYSICAL THERAPY NOTE
PT treatment note      11/10/17 1149   Restrictions/Precautions   Other Precautions (Fall Risk;O2;Multiple lines; Chair Alarm)   Subjective   Subjective Agreeable to therapy   Bed Mobility   Additional Comments OOB in chair at start of PT session   Transfers   Sit to Stand 5  Supervision   Stand to Sit 5  Supervision   Additional items Armrests   Stand pivot 5  Supervision   Ambulation/Elevation   Gait pattern (Improper Weight shift;L Foot drag;Decreased foot clearance;S)   Gait Assistance (CGA)   Additional items Verbal cues   Assistive Device Straight cane   Distance 200'   Balance   Static Sitting Good   Dynamic Sitting Good   Static Standing Fair   Dynamic Standing Fair   Ambulatory Fair  (with SPC)   Endurance Deficit   Endurance Deficit Yes   Endurance Deficit Description decreased ambulation tolerance due to fatigue and mild drop in SpO2 (7-94%, -122 with 2 L O2, minimal SOB   Activity Tolerance   Activity Tolerance Patient tolerated treatment well   Exercises   Hip Flexion 20 reps   Hip Abduction 20 reps   Hip Adduction 20 reps   Knee AROM Long Arc Quad 20 reps   Ankle Pumps 20 reps   Assessment   Prognosis Good   Problem List Decreased strength;Decreased range of motion; Impaired balance;Decreased mobility; Decreased safety awareness   Assessment Pt  performing all transfers and ambulation at a (S/CGA) level  Verbal cues for safety  No LOB  HR elevated as noted above with activity  Mild SOB  Pt is in need of continued activity in PT to improve impairments and functional deficits  Plan   Treatment/Interventions Functional transfer training;LE strengthening/ROM; Therapeutic exercise; Endurance training;Bed mobility;Gait training   Progress Progressing toward goals   Recommendation   Recommendation Home PT   Pt  OOB with call bell within reach, scd's connected and turned on and alarm on at end of PT session

## 2017-11-10 NOTE — SPEECH THERAPY NOTE
Speech Language/Pathology  Speech/Language Pathology  Assessment    Patient Name: Alexander Longoria  HAXOI'R Date: 11/10/2017     Problem List  Patient Active Problem List   Diagnosis    Vitamin D deficiency    [facility] exacerbation [facility] [address] )    Anemia    Sepsis ([facility] [place] [facility] )    Leukocytosis    Nicotine dependence    Acute tracheobronchitis    Tobacco abuse    Low TSH level    Low serum prealbumin    BMI less than 19,adult    Malnutrition of moderate degree (HCC)    Hypoalbuminemia    Hyperbilirubinemia    Hypokalemia    Dysphagia    Low serum triiodothyronine (T3)    Microscopic hematuria    Moderate protein-calorie malnutrition (HCC)    Pain of right heel    Hypophosphatemia    Abnormal chest x-ray    Renal cyst, right     Past Medical History  Past Medical History:   Diagnosis Date    Anemia     Asthma     COPD (chronic obstructive pulmonary disease) (HCC)     Heart disease     Left wrist injury     Low testosterone     Stroke ([facility] [place] [facility] )     Vitamin D deficiency      Past Surgical History  History reviewed  No pertinent surgical history  11/10/17 1400   Patient Information   Current Medical sepsis, vit D deficiency, COPD, anemia, leukocytosis, nicotine dependence  Swallow Information   Current Risks for Dysphagia & Aspiration General debilitation   Current Symptoms/Concerns Cough;Clear throat; With liquids; Moist cough; Wet voice   Current Diet Dysphagia advance; Nectar thick liquid   Baseline Diet Dyphagia advanced; Nectar thick liquids   Baseline Assessment   Behavior/Cognition Alert; Cooperative; Interactive   Patient Positioning Upright in chair   Swallow Mechanism Exam   Labial Symmetry Abnormal symmetry left   Labial Strength Reduced   Labial ROM WFL   Labial Sensation WFL   Facial Symmetry Left droop   Facial Strength WFL   Facial ROM WFL   Facial Sensation WFL   SpO2 95 %   Consistencies Assessed and Performance   Materials Admnistered Nectar thick liquid;Honey thick liquid   Oral Stage WFL; Mild impaired   Phargngeal Stage Mild impaired; Moderate impaired   Swallow Mechanics Mild delayed;Swallow initation;Weak larygneal rise   Summary   Swallow Summary Patient presents to evaluation today with hx of previous stroke with residual L sided weakness evident during oral motor exam   Patient reports he lives at home and that the thickened liquid is "just since I got here "  NSG reports this is due to previous CVA hx   Patient reports he gets a food delivery service at home, 14 meals every two weeks  Patient was presented honey thick liquid via cup/straw with no overt s/s of penetration or aspiration  Patient presented NTL via cup, no overt s/s and clear vocal quality when prompted  Patient also presented NTL via straw at which time patient had throat clear x2, cough x2 and delayed cough x1  Patient's respiratory therapist present, reporting wheezing on inhalation and exhalation  Pt has wet cough at baseline and required a breathing treatment  Patient performed best on honey thick liquid with single sips, individual sips and NO STRAW  Patient's chart indicating that he may have lower left lobe infiltrate, possible aspiration pneumonia  Patient will be downgraded to honey thick liquids at this time  ST to f/u  Recommendations   Risk for Aspiration Present   Recommendations Consider oral diet   Diet Solid Recommendation Level 3 Dysphagia/ advanced/ soft to chew   Diet Liquid Recommendation Honey thick liquid   Recommended Form of Meds Crushed; With thick liquid; With puree; As tolerated   General Precautions Aspiration precautions; Feed only when alert;Minimize distractions   Compensatory Swallowing Strategies Alternate solids and liquids; No straws   Results Reviewed with MD;PT/Family/Caregiver   Treatment Recommendations   Duration of treatment (One week)   Follow up treatments Assure diet tolerance; Patient/family education   Speech Therapy Prognosis   Prognosis Guarded   Prognosis Considerations Co-Morbidities

## 2017-11-10 NOTE — PROGRESS NOTES
Progress Note - Sheri Castañeda 74 y o  male MRN: [id number]    Unit/Bed#: 417-01 Encounter: [id number]      Assessment/Plan:    1  COPD with acute exacerbation / Presumed CAP : rocephin initiated yesterday, Echo shows diastolic dysfunction, cultures negative, appreciate input from pulmonary medicine  2  Dysphagia: modified diet  3  Renal Cyst: outpatient follow up with urology  4  Chronic hypoxic respiratory failure: patient wears O2 at home, will obtain ambulatory pulse ox prior to dc  Tentative discharge home tomorrow  Subjective:   Seen and examined @ bedside  Feels well, no SOB or CP  Tolerating diet  Objective:     Vitals:   Vitals:    11/10/17 0734   BP: 117/69   Pulse: 73   Resp: 20   Temp: 97 9 °F (36 6 °C)   SpO2: 96%     Body mass index is 19 54 kg/m²  Intake/Output Summary (Last 24 hours) at 11/10/17 1240  Last data filed at 11/10/17 0754   Gross per 24 hour   Intake              580 ml   Output             1650 ml   Net            -1070 ml       Physical Exam:   /69   Pulse 73   Temp 97 9 °F (36 6 °C) (Temporal)   Resp 20   Ht 5' 6" (1 676 m)   Wt 54 9 kg (121 lb 0 5 oz)   SpO2 96% Comment: 2 l/m Simultaneous filing  User may not have seen previous data    BMI 19 54 kg/m²   General appearance: alert, appears stated age and cooperative  Lungs: clear to auscultation bilaterally  Heart: regular rate and rhythm, S1, S2 normal, no murmur, click, rub or gallop  Abdomen: soft, non-tender; bowel sounds normal; no masses,  no organomegaly  Extremities: extremities normal, atraumatic, no cyanosis or edema  Pulses: 2+ and symmetric  Neurologic: Grossly normal     Invasive Devices     Peripheral Intravenous Line            Peripheral IV 11/05/17 Right Arm 4 days                  Results from last 7 days  Lab Units 11/10/17  0536 11/09/17  0448 11/08/17  0455   WBC Thousand/uL 10 52* 9 29 13 82*   HEMOGLOBIN g/dL 12 4 10 9* 10 6*   HEMATOCRIT % 39 0 34 5* 33 6*   PLATELETS Thousands/uL 240 221 230         Results from last 7 days  Lab Units 11/10/17  0536 11/09/17  0448 11/08/17  0455 11/07/17  0508   SODIUM mmol/L 142 143 143 141   POTASSIUM mmol/L 4 4 3 7 3 6 3 4*   CHLORIDE mmol/L 103 107 109* 106   CO2 mmol/L 35* 32 30 27   BUN mg/dL 10 8 11 15   CREATININE mg/dL 0 69 0 66 0 58* 0 76   CALCIUM mg/dL 8 7 8 1* 8 3 8 3   TOTAL PROTEIN g/dL 5 6* 5 3*  --  6 2*   BILIRUBIN TOTAL mg/dL 0 30 0 30  --  0 40   ALK PHOS U/L 54 52  --  61   ALT U/L 26 16  --  13   AST U/L 20 13  --  13   GLUCOSE RANDOM mg/dL 101 107 110 159*       Medication Administration - last 24 hours from 11/09/2017 1240 to 11/10/2017 1240       Date/Time Order Dose Route Action Action by     11/10/2017 0837 aspirin chewable tablet 81 mg 81 mg Oral Given Lorin Guzman, RN     11/09/2017 1642 atorvastatin (LIPITOR) tablet 80 mg 80 mg Oral Given Latasha Rojas RN     11/10/2017 0838 cyanocobalamin (VITAMIN B-12) tablet 250 mcg 250 mcg Oral Given Lorin Guzman RN     11/10/2017 0914 dextromethorphan-guaiFENesin (ROBITUSSIN DM)  mg/5 mL oral syrup 10 mL 10 mL Oral Given Lorin Guzman RN     11/10/2017 0158 dextromethorphan-guaiFENesin (ROBITUSSIN DM)  mg/5 mL oral syrup 10 mL 10 mL Oral Given [employer], RN     11/09/2017 1249 dextromethorphan-guaiFENesin (ROBITUSSIN DM)  mg/5 mL oral syrup 10 mL 10 mL Oral Given Latasha Rojas RN     11/10/2017 0553 pantoprazole (PROTONIX) EC tablet 40 mg 40 mg Oral Given [employer], RN     11/10/2017 2690 theophylline (HOLLY-24) 24 hr capsule 400 mg 400 mg Oral Given Lorin Guzman RN     11/10/2017 1238 oxyCODONE (ROXICODONE) IR tablet 5 mg 5 mg Oral Given Lorin Guzman, RN     11/10/2017 4378 oxyCODONE (ROXICODONE) IR tablet 5 mg 5 mg Oral Given Lorin Guzman, RN     11/09/2017 2214 nicotine (NICODERM CQ) 21 mg/24 hr TD 24 hr patch 1 patch 1 patch Transdermal Medication Applied [employer], RN     11/09/2017 2213 nicotine (NICODERM CQ) 21 mg/24 hr TD 24 hr patch 1 patch 1 patch Transdermal Patch Removed Beckie Bridges, RN     11/10/2017 0025 enoxaparin (LOVENOX) subcutaneous injection 40 mg 40 mg Subcutaneous Given Beckie Bridges, RN     11/10/2017 4242 levalbuterol (XOPENEX) inhalation solution 1 25 mg 1 25 mg Nebulization Given Anais Hard, RT     11/09/2017 2025 levalbuterol (Norvel Peaks) inhalation solution 1 25 mg 1 25 mg Nebulization Given Lc Sayrais, RT     11/09/2017 1323 levalbuterol (XOPENEX) inhalation solution 1 25 mg 1 25 mg Nebulization Given Anais Hard, RT     11/10/2017 1155 azithromycin (ZITHROMAX) tablet 500 mg 500 mg Oral Given Candelario Joseph, RN     11/10/2017 0733 ipratropium (ATROVENT) 0 02 % inhalation solution 0 5 mg 0 5 mg Nebulization Given Anais Hard, RT     11/09/2017 2025 ipratropium (ATROVENT) 0 02 % inhalation solution 0 5 mg 0 5 mg Nebulization Given Lc Cassis, RT     11/09/2017 1323 ipratropium (ATROVENT) 0 02 % inhalation solution 0 5 mg 0 5 mg Nebulization Given Anais Hard, RT     11/10/2017 0837 fluticasone-salmeterol (ADVAIR) 250-50 mcg/dose inhaler 1 puff 1 puff Inhalation Given Candelario Joseph, RN     11/09/2017 2211 fluticasone-salmeterol (ADVAIR) 250-50 mcg/dose inhaler 1 puff 1 puff Inhalation Given Beckie Bridges, RN     11/10/2017 0838 guaiFENesin (MUCINEX) 12 hr tablet 600 mg 600 mg Oral Given Candelario Joseph, RN     11/09/2017 2210 guaiFENesin (MUCINEX) 12 hr tablet 600 mg 600 mg Oral Given Beckie Bridges, RN     11/10/2017 0521 predniSONE tablet 40 mg 40 mg Oral Given Candelario Joseph, RN     11/09/2017 1401 magnesium sulfate 2 g/50 mL IVPB (premix) 2 g 2 g Intravenous New Bag Latasha Rojas, RN     11/09/2017 1242 cefTRIAXone (ROCEPHIN) 1,000 mg in dextrose 5 % 50 mL IVPB 1,000 mg Intravenous New Bag Latasha Rehlala, RN     11/09/2017 1420 iohexol (OMNIPAQUE) 350 MG/ML injection (SINGLE-DOSE) 85 mL 85 mL Intravenous Given Wolm Shows            Lab, Imaging and other studies: I have personally reviewed pertinent reports      VTE Pharmacologic Prophylaxis: Enoxaparin (Lovenox)  VTE Mechanical Prophylaxis: sequential compression device     Yasmine Gonzales MD  11/10/2017,12:40 PM

## 2017-11-10 NOTE — OCCUPATIONAL THERAPY NOTE
OT Note     11/10/17 0951   Restrictions/Precautions   Other Precautions Fall Risk;O2;Multiple lines; Chair Alarm   Bed Mobility   Supine to Sit 6  Modified independent   Additional items Bedrails   Transfers   Sit to Stand 5  Supervision   Stand to Sit 5  Supervision   Additional items Armrests   Functional Mobility   Functional Mobility 5  Supervision   Additional Comments Completes txfr EOB to recliner at bedside   Additional items [address]   Therapeutic Excerise-Strength   UE Strength Yes   Right Upper Extremity- Strength   R Shoulder Flexion; Extension;Horizontal ABduction   R Elbow Elbow flexion;Elbow extension   R Wrist Wrist flexion;Wrist extension   R Hand (Forearm pro/supination)   R Weight/Reps/Sets 3# free wt 3 sets/10   Activity Tolerance   Activity Tolerance Patient tolerated treatment well   Assessment   Assessment Presents supine, agreeable  to participate  Txfred OOB to recliner at bedside  Completes RUE therex as per above  O2 @ 2 liters thruout, O2 sat 97%  Chair alarm activated, SCDs applied and turned on, call bell and phone in reach     Recommendation   Recommendation (Continue OT services )

## 2017-11-10 NOTE — RESPIRATORY THERAPY NOTE
pt's pulse ox alarming, nurse Mirta Silverio called me and  stated sat was fine but alarming  Upon arrival Pt HR alarm going off at 130  Tech placed new probe on but pt's HR was alarming 129-130  Tech informed nurse of increased HR!

## 2017-11-11 VITALS
SYSTOLIC BLOOD PRESSURE: 116 MMHG | OXYGEN SATURATION: 94 % | WEIGHT: 118.61 LBS | TEMPERATURE: 98.3 F | RESPIRATION RATE: 18 BRPM | HEART RATE: 91 BPM | BODY MASS INDEX: 19.06 KG/M2 | DIASTOLIC BLOOD PRESSURE: 65 MMHG | HEIGHT: 66 IN

## 2017-11-11 LAB
BACTERIA BLD CULT: NORMAL
BACTERIA BLD CULT: NORMAL

## 2017-11-11 PROCEDURE — 94640 AIRWAY INHALATION TREATMENT: CPT

## 2017-11-11 PROCEDURE — G0009 ADMIN PNEUMOCOCCAL VACCINE: HCPCS | Performed by: FAMILY MEDICINE

## 2017-11-11 PROCEDURE — 90670 PCV13 VACCINE IM: CPT | Performed by: FAMILY MEDICINE

## 2017-11-11 PROCEDURE — 94760 N-INVAS EAR/PLS OXIMETRY 1: CPT

## 2017-11-11 PROCEDURE — 94762 N-INVAS EAR/PLS OXIMTRY CONT: CPT

## 2017-11-11 PROCEDURE — 82270 OCCULT BLOOD FECES: CPT | Performed by: INTERNAL MEDICINE

## 2017-11-11 PROCEDURE — 90686 IIV4 VACC NO PRSV 0.5 ML IM: CPT | Performed by: FAMILY MEDICINE

## 2017-11-11 PROCEDURE — G0008 ADMIN INFLUENZA VIRUS VAC: HCPCS | Performed by: FAMILY MEDICINE

## 2017-11-11 RX ORDER — FOLIC ACID 1 MG/1
1 TABLET ORAL DAILY
Qty: 30 TABLET | Refills: 3 | Status: SHIPPED | OUTPATIENT
Start: 2017-11-12

## 2017-11-11 RX ORDER — FOLIC ACID 1 MG/1
1 TABLET ORAL DAILY
Status: DISCONTINUED | OUTPATIENT
Start: 2017-11-11 | End: 2017-11-11 | Stop reason: HOSPADM

## 2017-11-11 RX ORDER — PREDNISONE 20 MG/1
TABLET ORAL
Qty: 40 TABLET | Refills: 0 | Status: ON HOLD | OUTPATIENT
Start: 2017-11-11 | End: 2018-03-10

## 2017-11-11 RX ORDER — CEFUROXIME AXETIL 500 MG/1
500 TABLET ORAL EVERY 12 HOURS SCHEDULED
Qty: 6 TABLET | Refills: 0 | Status: SHIPPED | OUTPATIENT
Start: 2017-11-11 | End: 2017-11-14

## 2017-11-11 RX ADMIN — OXYCODONE HYDROCHLORIDE 5 MG: 5 TABLET ORAL at 12:05

## 2017-11-11 RX ADMIN — PANTOPRAZOLE SODIUM 40 MG: 40 TABLET, DELAYED RELEASE ORAL at 05:00

## 2017-11-11 RX ADMIN — LEVALBUTEROL 1.25 MG: 1.25 SOLUTION, CONCENTRATE RESPIRATORY (INHALATION) at 10:06

## 2017-11-11 RX ADMIN — CYANOCOBALAMIN TAB 500 MCG 250 MCG: 500 TAB at 08:05

## 2017-11-11 RX ADMIN — IPRATROPIUM BROMIDE 0.5 MG: 0.5 SOLUTION RESPIRATORY (INHALATION) at 10:06

## 2017-11-11 RX ADMIN — PREDNISONE 40 MG: 20 TABLET ORAL at 08:05

## 2017-11-11 RX ADMIN — THEOPHYLLINE ANHYDROUS 400 MG: 400 CAPSULE, EXTENDED RELEASE ORAL at 08:07

## 2017-11-11 RX ADMIN — FLUTICASONE PROPIONATE AND SALMETEROL 1 PUFF: 50; 250 POWDER RESPIRATORY (INHALATION) at 08:07

## 2017-11-11 RX ADMIN — ENOXAPARIN SODIUM 40 MG: 40 INJECTION SUBCUTANEOUS at 00:25

## 2017-11-11 RX ADMIN — GUAIFENESIN 600 MG: 600 TABLET, EXTENDED RELEASE ORAL at 08:05

## 2017-11-11 RX ADMIN — ASPIRIN 81 MG CHEWABLE TABLET 81 MG: 81 TABLET CHEWABLE at 08:04

## 2017-11-11 RX ADMIN — INFLUENZA VIRUS VACCINE 0.5 ML: 15; 15; 15; 15 SUSPENSION INTRAMUSCULAR at 12:53

## 2017-11-11 RX ADMIN — FOLIC ACID 1 MG: 1 TABLET ORAL at 08:04

## 2017-11-11 RX ADMIN — OXYCODONE HYDROCHLORIDE 5 MG: 5 TABLET ORAL at 08:05

## 2017-11-11 RX ADMIN — GUAIFENESIN AND DEXTROMETHORPHAN 10 ML: 100; 10 SYRUP ORAL at 00:25

## 2017-11-11 RX ADMIN — PNEUMOCOCCAL 13-VALENT CONJUGATE VACCINE 0.5 ML: 2.2; 2.2; 2.2; 2.2; 2.2; 4.4; 2.2; 2.2; 2.2; 2.2; 2.2; 2.2; 2.2 INJECTION, SUSPENSION INTRAMUSCULAR at 12:46

## 2017-11-11 NOTE — PLAN OF CARE
Problem: DISCHARGE PLANNING - CARE MANAGEMENT  Goal: Discharge to post-acute care or home with appropriate resources  INTERVENTIONS:  - Conduct assessment to determine patient/family and health care team treatment goals, and need for post-acute services based on payer coverage, community resources, and patient preferences, and barriers to discharge  - Address psychosocial, clinical, and financial barriers to discharge as identified in assessment in conjunction with the patient/family and health care team  - Arrange appropriate level of post-acute services according to patient's   needs and preference and payer coverage in collaboration with the physician and health care team  - Communicate with and update the patient/family, physician, and health care team regarding progress on the discharge plan  - Arrange appropriate transportation to post-acute venues   Outcome: Completed Date Met: 11/11/17  Home with independent living services and the caregiver

## 2017-11-11 NOTE — SOCIAL WORK
Cm spoke with the patient and they are for d/c to home today  Cm is taking into account that the patient has preferences while planning the d/c needs  Cm plan was discussed with the patient and the patient is agreeable to the plan the patient does understand the importance of follow up care and taking the medications as prescribed  The patient is aware of any symptoms that he should look for when d/c  I spoke with Lane Wray and she is the caregiver for the patient and she is coming to pick him up with in the hour for d/c to home and she is agreeable to the d/c  The patient is also aware that he is going home and he signed the imm  I called independent living services as the patient gets aides Monday/ wed/ Friday and I told the answering service that he is d/c to home and that they will have to resume services to him on Monday they state that they will relay the message to the agency

## 2017-11-11 NOTE — DISCHARGE SUMMARY
Discharge Summary - Primitivo Jeter 74 y o  male MRN: [id number]    Unit/Bed#: 167-24 Encounter: [id number]    Admission Date: 11/5/2017     Admitting Diagnosis: Shortness of breath [R06 02]  Acute respiratory distress [R06 03]    HPI: The patient is a 74year-old male who presented to the emergency department with the complaints of worsening shortness of breath  The patient experiences chronic dyspnea secondary to his chronic medical condition of COPD  Over the last 24 hours, the patient's shortness of breath acutely worsened  The patient also developed a fever and a productive cough  Nothing seemed to relieve his symptoms  His shortness of breath worsened with any type of movement or exertion  No abdominal pain  No chest pain  No nausea or vomiting  In the emergency department, the patient was found to meet SIRS criteria  Procedures Performed:   Orders Placed This Encounter   Procedures    ED ECG Documentation Only       Hospital Course: The patient is a pleasant 78-year-old male with a history of hypoxic respiratory failure and COPD presented to the emergency room with shortness of breath  He received the 30cc/kg bolus, blood, urine cultures obtained and remained negative  Urine legionella and Pneumo antigen negative  He was started on solumedrol which was transitioned to prednisone on hospital day 5  He received 5 days of Azithromycin  Rocephin was started on hospital day 3 for possible underlying pneumonia for which he will complete 7 days of antibiotics (ceftin)  He was started on theophylline but had significant tachycardia therefore it will not be continued on discharge  He has oxygen at home and wears it PRN  His pulse ox on day of discharge at rest was 92%, and 87% with ambulation  He will follow up with Pulmonary medicine in 2 weeks  On date of discharge the patient was examined, he felt well and had no acute complaints  His lung and cardiac exam were unremarkable   I encouraged him to quit smoking especially with oxygen around  He said he'll think about it  Significant Findings, Care, Treatment and Services Provided:   CTA PE Study  1   No evidence of pulmonary embolus  2   Emphysema  3   Bilateral pleural effusions, small on the right and moderate in size on the left  4   Compressive atelectasis in the base of the left lower lobe due to the adjacent pleural effusion   A coexisting pneumonia cannot be excluded  Complications: none    Discharge Diagnosis:   COPD with acute exacerbation    Condition at Discharge: good     Discharge instructions/Information to patient and family:   See after visit summary for information provided to patient and family  Provisions for Follow-Up Care:  See after visit summary for information related to follow-up care and any pertinent home health orders  Disposition: Home    Planned Readmission: No    Discharge Statement   I spent 45 minutes discharging the patient  This time was spent on the day of discharge  I had direct contact with the patient on the day of discharge  Additional documentation is required if more than 30 minutes were spent on discharge  Discharge Medications:  See after visit summary for reconciled discharge medications provided to patient and family

## 2017-11-11 NOTE — RESPIRATORY THERAPY NOTE
Home Oxygen Qualifying Test       Patient name: Sheri Castañeda        : 1945   Date of Test:  2017  Diagnosis:      Home Oxygen Test:    **Medicare Guidelines require item(s) 1-5 on all ambulatory patients or 1 and 2 on on-ambulatory patients  1   Baseline SPO2 on Room Air at rest 92 %  If = or < 88% on room air add O2 via NC and titrate patient  Patient must be ambulated with O2 and titrated to > 88% with exertion  2   SPO2 on Oxygen at rest 94 %  3   SPO2 during exercise on Room Air *87** %     4   SPO2 during exercise on Oxygen  94% at a liter flow of 2 lpm     5   Exercise performed:    [x] Walking     [] Stairs     [x] Duration 10 (min )     [x] Distance **200* (ft )       [x]  Supplemental Home Oxygen is indicated  []  Client does not qualify for home oxygen        Brittanie Cerna, RT

## 2017-11-11 NOTE — PROGRESS NOTES
Patients room air sat was 92%  With ambulation on RA went down to 87%   When back in room O2 was reapplied @ 2L via NC

## 2017-11-13 LAB
DATE SPECIMEN #1: NORMAL
HEMOCCULT SP1 STL QL: NEGATIVE

## 2017-11-16 LAB
BASE EXCESS BLDA CALC-SCNC: 2 MMOL/L (ref -2–3)
CA-I BLD-SCNC: 1.12 MMOL/L (ref 1.12–1.32)
GLUCOSE SERPL-MCNC: 135 MG/DL (ref 65–140)
HCO3 BLDA-SCNC: 27.3 MMOL/L (ref 22–28)
HCT VFR BLD CALC: 44 % (ref 36.5–49.3)
HGB BLDA-MCNC: 15 G/DL (ref 12–17)
PCO2 BLD: 29 MMOL/L (ref 21–32)
PCO2 BLD: 41.9 MM HG (ref 36–44)
PH BLD: 7.42 [PH] (ref 7.35–7.45)
PO2 BLD: 109 MM HG (ref 75–129)
POTASSIUM BLD-SCNC: 3.2 MMOL/L (ref 3.5–5.3)
SAO2 % BLD FROM PO2: 98 % (ref 95–98)
SODIUM BLD-SCNC: 135 MMOL/L (ref 136–145)
SPECIMEN SOURCE: ABNORMAL

## 2017-12-08 ENCOUNTER — ALLSCRIPTS OFFICE VISIT (OUTPATIENT)
Dept: FAMILY MEDICINE CLINIC | Facility: CLINIC | Age: 72
End: 2017-12-08
Payer: COMMERCIAL

## 2017-12-08 DIAGNOSIS — E78.5 HYPERLIPIDEMIA: ICD-10-CM

## 2017-12-08 DIAGNOSIS — R94.6 ABNORMAL RESULTS OF THYROID FUNCTION STUDIES: ICD-10-CM

## 2017-12-08 DIAGNOSIS — E55.9 VITAMIN D DEFICIENCY: ICD-10-CM

## 2017-12-08 DIAGNOSIS — E53.8 DEFICIENCY OF OTHER SPECIFIED B GROUP VITAMINS (CODE): ICD-10-CM

## 2017-12-08 PROCEDURE — T1015 CLINIC SERVICE: HCPCS | Performed by: FAMILY MEDICINE

## 2017-12-10 NOTE — PROGRESS NOTES
Assessment    1  Vitamin D deficiency (268 9) (E55 9)   2  Vitamin B12 deficiency (266 2) (E53 8)   3  Chronic obstructive pulmonary disease (496) (J44 9)   4  Abnormal thyroid blood test (794 5) (R94 6)   5  Pulmonary emphysema (492 8) (J43 9)   6  Borderline hyperlipidemia (272 4) (E78 5)    Plan  Abnormal thyroid blood test    · (1) TSH; Status:Active; Requested for:93Ubd8744;   Borderline hyperlipidemia    · (1) CBC/ PLT (NO DIFF); Status:Active; Requested for:28Wml3754;    · (1) COMPREHENSIVE METABOLIC PANEL; Status:Active; Requested for:87Mwf1497;    · (1) LIPID PANEL FASTING W DIRECT LDL REFLEX; Status:Active; Requestedfor:56Xll1238;   Chronic obstructive pulmonary disease    · Albuterol Sulfate (2 5 MG/3ML) 0 083% Inhalation Nebulization Solution; USE 1UNIT DOSE EVERY 4-6 HOURS AS NEEDED FOR WHEEZING    · Benzonatate 200 MG Oral Capsule; TAKE 1 CAPSULE 2-3 TIMES DAILY   · Nebulizer/Tubing/Mouthpiece KIT; USE AS DIRECTED   · 1 - Bossman Bravo DO (Pulmonary Disease) Co-Management  *  Status: Active Requested for: 14FLM8072  Care Summary provided  : Yes  Chronic obstructive pulmonary disease, Pulmonary emphysema    · Combivent Respimat  MCG/ACT Inhalation Aerosol Solution; INHALE 1PUFF 4 TIMES DAILY (MAXIMUM OF 6 PUFFS IN 24 HOURS)  Vitamin B12 deficiency    · (1) VITAMIN B12; Status:Active; Requested for:15Nuv8878;   Vitamin D deficiency    · (1) VITAMIN D 25-HYDROXY; Status:Active; Requested for:89Jfv2882;     Discussion/Summary    -Will refer to pulmonologyneeds to get labsurgerd him to quit smoking, he refusesgive Tessalon pearls for coughhad the Prevnar 13 in the hospital as per family  The patient was counseled regarding  Chief Complaint  PT is here today for a med refill  Caregiver is with pt who states that pt has a cough and would like some Meds called into pharm        History of Present Illness  The Patient is a 75 y/o male with a PMH significant for COPD, Emphysema, Vitamin D deficiency, Anemia, Bitmin B12 deficiency, CAD, hypothyroidism, O2 dependenc 3 L 02 at night, and tobacco dependence who presents for follow-up  He was admitted to [address] [place] [address] 11/5/2017 for hypoxia, pneumonia, and respiratory failure  His legionella was negative  He was treated with Zithromax and Rocephin  He had a CTA that was negative for PE but did show emphysema and BL pleural effusions  He was encouraged to quit smoking and discharged on CEftin with follow-up with Pulmonology  He still is awaiting an appointment with Pulmonology  He is currently due for labs  did have flu vaccine and Pneumonia Pneumovax in 9/2016      Review of Systems   Constitutional: No fever or chills, feels well, no tiredness, no recent weight gain or weight loss  Eyes: No complaints of eye pain, no red eyes, no discharge from eyes, no itchy eyes  ENT: no complaints of earache, no hearing loss, no nosebleeds, no nasal discharge, no sore throat, no hoarseness  Cardiovascular: No complaints of slow heart rate, no fast heart rate, no chest pain, no palpitations, no leg claudication, no lower extremity  Respiratory: cough, but-- No complaints of shortness of breath, no wheezing, no cough, no SOB on exertion, no orthopnea or PND  Gastrointestinal: No complaints of abdominal pain, no constipation, no nausea or vomiting, no diarrhea or bloody stools  Genitourinary: No complaints of dysuria, no incontinence, no hesitancy, no nocturia, no genital lesion, no testicular pain  Musculoskeletal: No complaints of arthralgia, no myalgias, no joint swelling or stiffness, no limb pain or swelling  Integumentary: No complaints of skin rash or skin lesions, no itching, no skin wound, no dry skin  Neurological: No compliants of headache, no confusion, no convulsions, no numbness or tingling, no dizziness or fainting, no limb weakness, no difficulty walking    Psychiatric: Is not suicidal, no sleep disturbances, no anxiety or depression, no change in personality, no emotional problems  Endocrine: No complaints of proptosis, no hot flashes, no muscle weakness, no erectile dysfunction, no deepening of the voice, no feelings of weakness  Hematologic/Lymphatic: No complaints of swollen glands, no swollen glands in the neck, does not bleed easily, no easy bruising  ROS reviewed  Active Problems  1  Abnormal thyroid blood test (794 5) (R94 6)   2  Anemia (285 9) (D64 9)   3  Chronic obstructive pulmonary disease (496) (J44 9)   4  Heart disease (429 9) (I51 9)   5  Need for pneumococcal vaccination (V03 82) (Z23)   6  Pulmonary emphysema (492 8) (J43 9)   7  Right groin mass (789 39) (R19 09)   8  Testosterone deficiency (259 9) (E34 9)   9  Urinary incontinence (788 30) (R32)   10  Vitamin B12 deficiency (266 2) (E53 8)   11  Vitamin D deficiency (268 9) (E55 9)    Past Medical History    The active problems and past medical history were reviewed and updated today  Surgical History  1  Denied: History of Reported Prior Surgical / Procedural History    The surgical history was reviewed and updated today  Family History  Other    1  Family history of cardiac disorder (V17 49) (Z82 49)    The family history was reviewed and updated today  Social History     · Current every day smoker (305 1) (F17 200)  The social history was reviewed and updated today  Current Meds   1  Briefs Overnight Medium Miscellaneous; use as directed; Therapy: 41BZL4847 to (Last Rx:49Qyv8906) Ordered   2  Combivent Respimat  MCG/ACT Inhalation Aerosol Solution; INHALE 1 PUFF 4 TIMES DAILY (MAXIMUM OF 6 PUFFS IN 24 HOURS)  Requested for: 54Ugs0989; Last Rx:01Sep2017 Ordered   3  [employer] Cleansing Wipes Miscellaneous; use as directed; Therapy: 90WIE6910 to (Last Rx:16Ilz2467) Ordered   4  [employer] Cleansing Wipes Miscellaneous; use as directed; Therapy: 44KSE6124 to (Last Rx:17Nov2015)  Requested for: 38RZQ0504 Ordered   5   Depend Guards for Men Miscellaneous; use as directed; Therapy: 65DPL1911 to (Last Rx:83Gmn4763)  Requested for: 41EKD1273 Ordered   6  Depend Undergarments Miscellaneous; size small; Therapy: 66LFS2200 to (Last Rosaleen Patch)  Requested for: 21Oct2016 Ordered   7  Depend Undergarments Miscellaneous; use as directed; Therapy: 70BDU4621 to (Last Rx:21Oct2016)  Requested for: 11Ttr6334 Ordered   8  Dulera 200-5 MCG/ACT Inhalation Aerosol; INHALE 2 PUFFS TWICE DAILY  RINSE MOUTH AFTER USE  Requested for: 28Jun2017; Last Rx:28Jun2017 Ordered   9  Spiriva HandiHaler 18 MCG Inhalation Capsule; INHALE CONTENTS OF 1 CAPSULE ONCE DAILY; Therapy: 42Vll4214 to (Last Kiera Dunk)  Requested for: 28Jun2017 Ordered   10  Ventolin  (90 Base) MCG/ACT Inhalation Aerosol Solution; INHALE 1 TO 2 PUFFS  EVERY 4 TO 6 HOURS AS NEEDED; Therapy: 54CQE5076 to (77 873 135)  Requested for: 03QRZ3822; Last  Rx:28Jun2017 Ordered   11  Vitamin D (Ergocalciferol) 95970 UNIT Oral Capsule; 1 cap 2x weekly for 6 weeks; Therapy: 38PMO3524 to (Evaluate:30Mar2017)  Requested for: 94NDQ0245; Last  Rx:06Mar2017 Ordered   12  Vitamin D (Ergocalciferol) 86788 UNIT Oral Capsule; TAKE 1 CAPSULE WEEKLY for 12  weeks; Therapy: 84BXO3611 to (Last IN:43QUL2093)  Requested for: 28ZEX2517 Ordered    The medication list was reviewed and updated today  Allergies  1  No Known Drug Allergies    Vitals  Vital Signs    Recorded: 48RXY8415 10:22AM   Temperature 99 2 F   Heart Rate 104   Respiration 20   Systolic 767   Diastolic 70   Height 5 ft 6 in   Weight 119 lb    BMI Calculated 19 21   BSA Calculated 1 6   O2 Saturation 92       Physical Exam   Constitutional  General appearance: Abnormal   malodorous,-- disheveled clothing,-- unkempt appearance-- and-- appears older than stated age  Ears, Nose, Mouth, and Throat erythema pharynx  Pulmonary  Auscultation of lungs: Abnormal  -- wheezing and scattered rhonchi    Cardiovascular  Auscultation of heart: Abnormal  -- grade III/VI systolic murmur  Abdomen  Abdomen: Non-tender, no masses           Signatures   Electronically signed by : Rubin Manzo, [facility]; Dec  8 2017 11:22AM EST                       (Author)    Electronically signed by : Barbara Hurd DO; Dec  9 2017  6:20PM EST                       (Review)

## 2018-01-14 VITALS
RESPIRATION RATE: 18 BRPM | WEIGHT: 122 LBS | SYSTOLIC BLOOD PRESSURE: 88 MMHG | OXYGEN SATURATION: 90 % | DIASTOLIC BLOOD PRESSURE: 42 MMHG | HEART RATE: 113 BPM | BODY MASS INDEX: 19.61 KG/M2 | HEIGHT: 66 IN

## 2018-01-14 VITALS
WEIGHT: 123 LBS | HEIGHT: 66 IN | SYSTOLIC BLOOD PRESSURE: 106 MMHG | RESPIRATION RATE: 18 BRPM | BODY MASS INDEX: 19.77 KG/M2 | OXYGEN SATURATION: 95 % | HEART RATE: 102 BPM | DIASTOLIC BLOOD PRESSURE: 68 MMHG

## 2018-01-15 VITALS
DIASTOLIC BLOOD PRESSURE: 72 MMHG | WEIGHT: 117 LBS | OXYGEN SATURATION: 94 % | HEART RATE: 94 BPM | BODY MASS INDEX: 18.8 KG/M2 | RESPIRATION RATE: 17 BRPM | TEMPERATURE: 98.6 F | HEIGHT: 66 IN | SYSTOLIC BLOOD PRESSURE: 100 MMHG

## 2018-01-15 VITALS
HEART RATE: 113 BPM | RESPIRATION RATE: 20 BRPM | WEIGHT: 119 LBS | SYSTOLIC BLOOD PRESSURE: 120 MMHG | BODY MASS INDEX: 19.13 KG/M2 | OXYGEN SATURATION: 93 % | DIASTOLIC BLOOD PRESSURE: 68 MMHG | HEIGHT: 66 IN

## 2018-01-16 NOTE — MISCELLANEOUS
Provider Comments  Provider Comments:   Lenore Nati did not show to his scheduled appointment today  Our office office attempted to reschedule  A message was left on voicemail        Signatures   Electronically signed by : Jesu Morgan DO; Oct 19 2016 12:06PM EST                       (Author)

## 2018-01-23 VITALS
HEART RATE: 104 BPM | RESPIRATION RATE: 20 BRPM | DIASTOLIC BLOOD PRESSURE: 70 MMHG | WEIGHT: 119 LBS | OXYGEN SATURATION: 92 % | SYSTOLIC BLOOD PRESSURE: 110 MMHG | HEIGHT: 66 IN | TEMPERATURE: 99.2 F | BODY MASS INDEX: 19.13 KG/M2

## 2018-03-05 ENCOUNTER — APPOINTMENT (EMERGENCY)
Dept: RADIOLOGY | Facility: HOSPITAL | Age: 73
DRG: 871 | End: 2018-03-05
Payer: MEDICARE

## 2018-03-05 ENCOUNTER — OFFICE VISIT (OUTPATIENT)
Dept: PULMONOLOGY | Facility: HOSPITAL | Age: 73
End: 2018-03-05
Payer: COMMERCIAL

## 2018-03-05 ENCOUNTER — HOSPITAL ENCOUNTER (INPATIENT)
Facility: HOSPITAL | Age: 73
LOS: 5 days | Discharge: HOME HEALTH | DRG: 871 | End: 2018-03-10
Attending: EMERGENCY MEDICINE | Admitting: FAMILY MEDICINE
Payer: MEDICARE

## 2018-03-05 VITALS — HEART RATE: 136 BPM | DIASTOLIC BLOOD PRESSURE: 86 MMHG | OXYGEN SATURATION: 78 % | SYSTOLIC BLOOD PRESSURE: 156 MMHG

## 2018-03-05 DIAGNOSIS — R91.1 PULMONARY NODULE: ICD-10-CM

## 2018-03-05 DIAGNOSIS — R00.0 TACHYCARDIA: ICD-10-CM

## 2018-03-05 DIAGNOSIS — J96.21 ACUTE ON CHRONIC RESPIRATORY FAILURE WITH HYPOXIA (HCC): ICD-10-CM

## 2018-03-05 DIAGNOSIS — J44.1 COPD WITH ACUTE EXACERBATION (HCC): Primary | ICD-10-CM

## 2018-03-05 DIAGNOSIS — R06.00 ACUTE DYSPNEA: Primary | ICD-10-CM

## 2018-03-05 DIAGNOSIS — Z72.0 TOBACCO ABUSE: ICD-10-CM

## 2018-03-05 DIAGNOSIS — J44.1 COPD WITH ACUTE EXACERBATION (HCC): ICD-10-CM

## 2018-03-05 DIAGNOSIS — E43 SEVERE PROTEIN-CALORIE MALNUTRITION (HCC): ICD-10-CM

## 2018-03-05 DIAGNOSIS — E55.9 VITAMIN D DEFICIENCY: ICD-10-CM

## 2018-03-05 PROBLEM — E87.6 HYPOKALEMIA: Status: RESOLVED | Noted: 2017-11-07 | Resolved: 2018-03-05

## 2018-03-05 PROBLEM — E83.39 HYPOPHOSPHATEMIA: Status: RESOLVED | Noted: 2017-11-09 | Resolved: 2018-03-05

## 2018-03-05 PROBLEM — R93.89 ABNORMAL CHEST X-RAY: Status: RESOLVED | Noted: 2017-11-09 | Resolved: 2018-03-05

## 2018-03-05 LAB
ABO GROUP BLD: NORMAL
ALBUMIN SERPL BCP-MCNC: 2.9 G/DL (ref 3.5–5)
ALP SERPL-CCNC: 79 U/L (ref 46–116)
ALT SERPL W P-5'-P-CCNC: 21 U/L (ref 12–78)
ANION GAP SERPL CALCULATED.3IONS-SCNC: 3 MMOL/L (ref 4–13)
APTT PPP: 36 SECONDS (ref 23–35)
ARTERIAL PATENCY WRIST A: YES
AST SERPL W P-5'-P-CCNC: 22 U/L (ref 5–45)
BASE EXCESS BLDA CALC-SCNC: 6 MMOL/L
BASOPHILS # BLD AUTO: 0.04 THOUSANDS/ΜL (ref 0–0.1)
BASOPHILS NFR BLD AUTO: 0 % (ref 0–1)
BILIRUB SERPL-MCNC: 0.9 MG/DL (ref 0.2–1)
BLD GP AB SCN SERPL QL: NEGATIVE
BUN SERPL-MCNC: 12 MG/DL (ref 5–25)
CALCIUM SERPL-MCNC: 8.9 MG/DL (ref 8.3–10.1)
CHLORIDE SERPL-SCNC: 101 MMOL/L (ref 100–108)
CO2 SERPL-SCNC: 34 MMOL/L (ref 21–32)
CREAT SERPL-MCNC: 0.69 MG/DL (ref 0.6–1.3)
EOSINOPHIL # BLD AUTO: 0.18 THOUSAND/ΜL (ref 0–0.61)
EOSINOPHIL NFR BLD AUTO: 2 % (ref 0–6)
ERYTHROCYTE [DISTWIDTH] IN BLOOD BY AUTOMATED COUNT: 12.9 % (ref 11.6–15.1)
GFR SERPL CREATININE-BSD FRML MDRD: 94 ML/MIN/1.73SQ M
GLUCOSE SERPL-MCNC: 102 MG/DL (ref 65–140)
HCO3 BLDA-SCNC: 34 MMOL/L (ref 22–28)
HCT VFR BLD AUTO: 46.1 % (ref 36.5–49.3)
HGB BLD-MCNC: 14.9 G/DL (ref 12–17)
HOLD SPECIMEN: NORMAL
INR PPP: 1.16 (ref 0.86–1.16)
LACTATE SERPL-SCNC: 1.1 MMOL/L (ref 0.5–2)
LYMPHOCYTES # BLD AUTO: 1.36 THOUSANDS/ΜL (ref 0.6–4.47)
LYMPHOCYTES NFR BLD AUTO: 11 % (ref 14–44)
MAGNESIUM SERPL-MCNC: 2 MG/DL (ref 1.6–2.6)
MCH RBC QN AUTO: 30.6 PG (ref 26.8–34.3)
MCHC RBC AUTO-ENTMCNC: 32.3 G/DL (ref 31.4–37.4)
MCV RBC AUTO: 95 FL (ref 82–98)
MONOCYTES # BLD AUTO: 1.34 THOUSAND/ΜL (ref 0.17–1.22)
MONOCYTES NFR BLD AUTO: 11 % (ref 4–12)
NASAL CANNULA: 4
NEUTROPHILS # BLD AUTO: 9.28 THOUSANDS/ΜL (ref 1.85–7.62)
NEUTS SEG NFR BLD AUTO: 76 % (ref 43–75)
O2 CT BLDA-SCNC: 20.1 ML/DL (ref 16–23)
OXYHGB MFR BLDA: 96.8 % (ref 94–97)
PCO2 BLDA: 63.8 MM HG (ref 36–44)
PH BLDA: 7.34 [PH] (ref 7.35–7.45)
PLATELET # BLD AUTO: 243 THOUSANDS/UL (ref 149–390)
PLATELET # BLD AUTO: 259 THOUSANDS/UL (ref 149–390)
PMV BLD AUTO: 10 FL (ref 8.9–12.7)
PMV BLD AUTO: 10.3 FL (ref 8.9–12.7)
PO2 BLDA: 104.7 MM HG (ref 75–129)
POTASSIUM SERPL-SCNC: 3.9 MMOL/L (ref 3.5–5.3)
PROT SERPL-MCNC: 7.7 G/DL (ref 6.4–8.2)
PROTHROMBIN TIME: 14.7 SECONDS (ref 12.1–14.4)
RBC # BLD AUTO: 4.87 MILLION/UL (ref 3.88–5.62)
RH BLD: POSITIVE
SODIUM SERPL-SCNC: 138 MMOL/L (ref 136–145)
SPECIMEN EXPIRATION DATE: NORMAL
SPECIMEN SOURCE: ABNORMAL
SPECIMEN SOURCE: NORMAL
TROPONIN I BLD-MCNC: 0 NG/ML (ref 0–0.08)
TROPONIN I SERPL-MCNC: <0.02 NG/ML
WBC # BLD AUTO: 12.2 THOUSAND/UL (ref 4.31–10.16)

## 2018-03-05 PROCEDURE — 99223 1ST HOSP IP/OBS HIGH 75: CPT | Performed by: FAMILY MEDICINE

## 2018-03-05 PROCEDURE — 36415 COLL VENOUS BLD VENIPUNCTURE: CPT | Performed by: EMERGENCY MEDICINE

## 2018-03-05 PROCEDURE — 82805 BLOOD GASES W/O2 SATURATION: CPT | Performed by: EMERGENCY MEDICINE

## 2018-03-05 PROCEDURE — 99285 EMERGENCY DEPT VISIT HI MDM: CPT

## 2018-03-05 PROCEDURE — 36600 WITHDRAWAL OF ARTERIAL BLOOD: CPT

## 2018-03-05 PROCEDURE — 93005 ELECTROCARDIOGRAM TRACING: CPT | Performed by: EMERGENCY MEDICINE

## 2018-03-05 PROCEDURE — 86900 BLOOD TYPING SEROLOGIC ABO: CPT | Performed by: EMERGENCY MEDICINE

## 2018-03-05 PROCEDURE — 94640 AIRWAY INHALATION TREATMENT: CPT

## 2018-03-05 PROCEDURE — 94664 DEMO&/EVAL PT USE INHALER: CPT

## 2018-03-05 PROCEDURE — 86901 BLOOD TYPING SEROLOGIC RH(D): CPT | Performed by: EMERGENCY MEDICINE

## 2018-03-05 PROCEDURE — 83605 ASSAY OF LACTIC ACID: CPT | Performed by: EMERGENCY MEDICINE

## 2018-03-05 PROCEDURE — 99215 OFFICE O/P EST HI 40 MIN: CPT | Performed by: INTERNAL MEDICINE

## 2018-03-05 PROCEDURE — 85730 THROMBOPLASTIN TIME PARTIAL: CPT | Performed by: EMERGENCY MEDICINE

## 2018-03-05 PROCEDURE — 84484 ASSAY OF TROPONIN QUANT: CPT | Performed by: FAMILY MEDICINE

## 2018-03-05 PROCEDURE — 87040 BLOOD CULTURE FOR BACTERIA: CPT | Performed by: EMERGENCY MEDICINE

## 2018-03-05 PROCEDURE — 96374 THER/PROPH/DIAG INJ IV PUSH: CPT

## 2018-03-05 PROCEDURE — 80053 COMPREHEN METABOLIC PANEL: CPT | Performed by: EMERGENCY MEDICINE

## 2018-03-05 PROCEDURE — 85025 COMPLETE CBC W/AUTO DIFF WBC: CPT | Performed by: EMERGENCY MEDICINE

## 2018-03-05 PROCEDURE — 84484 ASSAY OF TROPONIN QUANT: CPT

## 2018-03-05 PROCEDURE — 85049 AUTOMATED PLATELET COUNT: CPT | Performed by: FAMILY MEDICINE

## 2018-03-05 PROCEDURE — 71045 X-RAY EXAM CHEST 1 VIEW: CPT

## 2018-03-05 PROCEDURE — 85610 PROTHROMBIN TIME: CPT | Performed by: EMERGENCY MEDICINE

## 2018-03-05 PROCEDURE — 86850 RBC ANTIBODY SCREEN: CPT | Performed by: EMERGENCY MEDICINE

## 2018-03-05 PROCEDURE — 94760 N-INVAS EAR/PLS OXIMETRY 1: CPT

## 2018-03-05 PROCEDURE — 83735 ASSAY OF MAGNESIUM: CPT | Performed by: EMERGENCY MEDICINE

## 2018-03-05 PROCEDURE — 87633 RESP VIRUS 12-25 TARGETS: CPT | Performed by: FAMILY MEDICINE

## 2018-03-05 RX ORDER — SODIUM CHLORIDE FOR INHALATION 0.9 %
3 VIAL, NEBULIZER (ML) INHALATION ONCE
Status: COMPLETED | OUTPATIENT
Start: 2018-03-05 | End: 2018-03-05

## 2018-03-05 RX ORDER — FOLIC ACID 1 MG/1
1 TABLET ORAL DAILY
Status: DISCONTINUED | OUTPATIENT
Start: 2018-03-06 | End: 2018-03-10 | Stop reason: HOSPADM

## 2018-03-05 RX ORDER — 0.9 % SODIUM CHLORIDE 0.9 %
3 VIAL (ML) INJECTION AS NEEDED
Status: DISCONTINUED | OUTPATIENT
Start: 2018-03-05 | End: 2018-03-07 | Stop reason: HOSPADM

## 2018-03-05 RX ORDER — SODIUM CHLORIDE 9 MG/ML
100 INJECTION, SOLUTION INTRAVENOUS ONCE
Status: COMPLETED | OUTPATIENT
Start: 2018-03-05 | End: 2018-03-06

## 2018-03-05 RX ORDER — METHYLPREDNISOLONE SODIUM SUCCINATE 125 MG/2ML
125 INJECTION, POWDER, LYOPHILIZED, FOR SOLUTION INTRAMUSCULAR; INTRAVENOUS ONCE
Status: COMPLETED | OUTPATIENT
Start: 2018-03-05 | End: 2018-03-05

## 2018-03-05 RX ORDER — NICOTINE 21 MG/24HR
1 PATCH, TRANSDERMAL 24 HOURS TRANSDERMAL DAILY
Status: DISCONTINUED | OUTPATIENT
Start: 2018-03-06 | End: 2018-03-10 | Stop reason: HOSPADM

## 2018-03-05 RX ORDER — LEVALBUTEROL 1.25 MG/.5ML
1.25 SOLUTION, CONCENTRATE RESPIRATORY (INHALATION)
Status: DISCONTINUED | OUTPATIENT
Start: 2018-03-05 | End: 2018-03-07

## 2018-03-05 RX ORDER — CHOLECALCIFEROL (VITAMIN D3) 125 MCG
250 CAPSULE ORAL DAILY
Status: DISCONTINUED | OUTPATIENT
Start: 2018-03-06 | End: 2018-03-10 | Stop reason: HOSPADM

## 2018-03-05 RX ORDER — LEVOFLOXACIN 5 MG/ML
750 INJECTION, SOLUTION INTRAVENOUS EVERY 24 HOURS
Status: DISCONTINUED | OUTPATIENT
Start: 2018-03-05 | End: 2018-03-10 | Stop reason: HOSPADM

## 2018-03-05 RX ORDER — METHYLPREDNISOLONE SODIUM SUCCINATE 40 MG/ML
40 INJECTION, POWDER, LYOPHILIZED, FOR SOLUTION INTRAMUSCULAR; INTRAVENOUS EVERY 12 HOURS SCHEDULED
Status: DISCONTINUED | OUTPATIENT
Start: 2018-03-05 | End: 2018-03-10 | Stop reason: HOSPADM

## 2018-03-05 RX ADMIN — METHYLPREDNISOLONE SODIUM SUCCINATE 40 MG: 40 INJECTION, POWDER, FOR SOLUTION INTRAMUSCULAR; INTRAVENOUS at 21:48

## 2018-03-05 RX ADMIN — ISODIUM CHLORIDE 3 ML: 0.03 SOLUTION RESPIRATORY (INHALATION) at 15:29

## 2018-03-05 RX ADMIN — IPRATROPIUM BROMIDE 1 MG: 0.5 SOLUTION RESPIRATORY (INHALATION) at 15:29

## 2018-03-05 RX ADMIN — LEVALBUTEROL HYDROCHLORIDE 1.25 MG: 1.25 SOLUTION, CONCENTRATE RESPIRATORY (INHALATION) at 22:50

## 2018-03-05 RX ADMIN — ALBUTEROL SULFATE 10 MG: 2.5 SOLUTION RESPIRATORY (INHALATION) at 15:29

## 2018-03-05 RX ADMIN — METHYLPREDNISOLONE SODIUM SUCCINATE 125 MG: 125 INJECTION, POWDER, FOR SOLUTION INTRAMUSCULAR; INTRAVENOUS at 15:29

## 2018-03-05 RX ADMIN — LEVOFLOXACIN 750 MG: 5 INJECTION, SOLUTION INTRAVENOUS at 21:49

## 2018-03-05 RX ADMIN — SODIUM CHLORIDE 100 ML/HR: 0.9 INJECTION, SOLUTION INTRAVENOUS at 21:48

## 2018-03-05 RX ADMIN — IPRATROPIUM BROMIDE 0.5 MG: 0.5 SOLUTION RESPIRATORY (INHALATION) at 22:49

## 2018-03-05 NOTE — H&P
H&P Exam - [facility] 74 y o  male MRN: [id number]    Unit/Bed#: RM01 Encounter: [id number]    Assessment:    Acute on chronic respiratory failure with hypoxia and hypercapnia secondary to acute exacerbation of Chronic obstructive pulmonary disease   Solumedrol 40mg Q12, Levaquin, initiate respiratory protocol  Blood cultures obtained x2, check sputum culture and respiratory pathogen profile  Check troponin x 2   Nicotine patch    LOS > 2 midnights  Full Code  Lovenox      History of Present Illness   The patient is a 74year old male known to me from previous hospitalization who presents to the ED from the office of his pulmonologist (Dr Bakari Brown)  He complains of increasing and worsening cough, and bringing up green yellow sputum  Has been turning up his home o2 and using his inhalers more than prescribed  He usually wears 2L but was noted to be saturating in the 70s in pulmonary office where he required being placed on 6L to bring sats up to the 90s  He continues to smoke up to 3 packs a day  Review of Systems   Constitutional: Positive for chills  Negative for fever  HENT: Negative  Eyes: Negative  Respiratory: Positive for cough, shortness of breath and wheezing  Cardiovascular: Negative  Gastrointestinal: Negative  Endocrine: Negative  Genitourinary: Negative  Musculoskeletal: Negative  Allergic/Immunologic: Negative  Neurological: Negative  Hematological: Negative  Psychiatric/Behavioral: Negative  Historical Information   Past Medical History:   Diagnosis Date    Anemia     Asthma     [facility])     Heart disease     Left wrist injury     Low testosterone     [facility] ([facility] [address] )     Vitamin D deficiency      History reviewed  No pertinent surgical history    Social History   History   Alcohol Use No     History   Drug Use No     History   Smoking Status    Heavy Tobacco Smoker    Packs/day: 1 00    Years: 59 00    Types: Cigarettes   Smokeless Tobacco    Never Used     Family History: non-contributory    Meds/Allergies   all medications and allergies reviewed  No Known Allergies    Objective   First Vitals:   Blood Pressure: 140/86 (03/05/18 1510)  Pulse: (!) 119 (03/05/18 1513)  Temperature: 99 2 °F (37 3 °C) (03/05/18 1510)  Temp Source: Temporal (03/05/18 1510)  Respirations: (!) 24 (03/05/18 1510)  Weight - Scale: 48 5 kg (107 lb) (03/05/18 1510)  SpO2: 97 % (03/05/18 1510)    Current Vitals:   Blood Pressure: 106/59 (03/05/18 1700)  Pulse: 105 (03/05/18 1715)  Temperature: 99 2 °F (37 3 °C) (03/05/18 1510)  Temp Source: Temporal (03/05/18 1510)  Respirations: 21 (03/05/18 1715)  Weight - Scale: 48 5 kg (107 lb) (03/05/18 1510)  SpO2: 93 % (03/05/18 1715)    No intake or output data in the 24 hours ending 03/05/18 1731    Invasive Devices     Peripheral Intravenous Line            Peripheral IV 03/05/18 Right Forearm less than 1 day    Peripheral IV 03/05/18 Right;Ventral (anterior) Antecubital less than 1 day                Physical Exam   Constitutional: He appears well-developed and well-nourished  Eyes: Pupils are equal, round, and reactive to light  No scleral icterus  Neck: Neck supple  No JVD present  Cardiovascular: Normal rate and regular rhythm  Pulmonary/Chest: He is in respiratory distress  He has no wheezes  He has no rales  Abdominal: Soft  Bowel sounds are normal  He exhibits no distension  There is no tenderness  There is no rebound  Musculoskeletal: Normal range of motion  He exhibits no edema  Neurological: He is alert  No cranial nerve deficit  Skin: Skin is warm and dry  He is not diaphoretic  No erythema         Lab Results:   Lab Results   Component Value Date    WBC 12 20 (H) 03/05/2018    HGB 14 9 03/05/2018    HCT 46 1 03/05/2018    MCV 95 03/05/2018     03/05/2018     Lab Results   Component Value Date    GLUCOSE 102 03/05/2018    CALCIUM 8 9 03/05/2018     03/05/2018    K 3 9 03/05/2018    CO2 34 (H) 03/05/2018     03/05/2018    BUN 12 03/05/2018    CREATININE 0 69 03/05/2018       Imaging:   XR chest 1 view portable   Final Result      Emphysema without radiographic evidence of interval decompensation            Workstation performed: [id number]             EKG, Pathology, and Other Studies: normal sinus rhythm      Code Status: Prior  Advance Directive and Living Will: Yes    Power of :    POLST:      Counseling / Coordination of Care:

## 2018-03-05 NOTE — ASSESSMENT & PLAN NOTE
· Increased cough and mucus production has felt poorly over the last several days  Increasing bronchodilator use  Using his supplemental oxygen at and turning up the oxygen at home as well  · Saturations on arrival here 78 percent on 2 L   Required 6 L and rest to bring him up to the low 90 percent range

## 2018-03-05 NOTE — LETTER
Assessment:     Acute on chronic respiratory failure with hypoxia and hypercapnia secondary to acute exacerbation of Chronic obstructive pulmonary disease   Solumedrol 40mg Q12, Levaquin, initiate respiratory protocol  Blood cultures obtained x2, check sputum culture and respiratory pathogen profile  Check troponin x 2   Nicotine patch     LOS > 2 midnights  Full Code  Lovenox        History of Present Illness         The patient is a 74year old male known to me from previous hospitalization who presents to the ED from the office of his pulmonologist (Dr David Vasquez)  He complains of increasing and worsening cough, and bringing up green yellow sputum  Has been turning up his home o2 and using his inhalers more than prescribed  He usually wears 2L but was noted to be saturating in the 70s in pulmonary office where he required being placed on 6L to bring sats up to the 90s  He continues to smoke up to 3 packs a day       Review of Systems   Constitutional: Positive for chills  Negative for fever  HENT: Negative  Eyes: Negative  Respiratory: Positive for cough, shortness of breath and wheezing  Cardiovascular: Negative  Gastrointestinal: Negative  Endocrine: Negative  Genitourinary: Negative  Musculoskeletal: Negative  Allergic/Immunologic: Negative  Neurological: Negative  Hematological: Negative  Psychiatric/Behavioral: Negative           Historical Information         Medical History        Past Medical History:   Diagnosis Date    Anemia      Asthma      COPD (chronic obstructive pulmonary disease) (HCC)      Heart disease      Left wrist injury      Low testosterone      Stroke ([facility] [address] )      Vitamin D deficiency           Surgical History   History reviewed  No pertinent surgical history       Social History             History   Alcohol Use No          History   Drug Use No           History   Smoking Status    Heavy Tobacco Smoker    Packs/day: 1 00    Years: 59 00    Types: Cigarettes   Smokeless Tobacco    Never Used      Family History: non-contributory     Meds/Allergies   all medications and allergies reviewed  No Known Allergies     Objective   First Vitals:   Blood Pressure: 140/86 (03/05/18 1510)  Pulse: (!) 119 (03/05/18 1513)  Temperature: 99 2 °F (37 3 °C) (03/05/18 1510)  Temp Source: Temporal (03/05/18 1510)  Respirations: (!) 24 (03/05/18 1510)  Weight - Scale: 48 5 kg (107 lb) (03/05/18 1510)  SpO2: 97 % (03/05/18 1510)     Current Vitals:   Blood Pressure: 106/59 (03/05/18 1700)  Pulse: 105 (03/05/18 1715)  Temperature: 99 2 °F (37 3 °C) (03/05/18 1510)  Temp Source: Temporal (03/05/18 1510)  Respirations: 21 (03/05/18 1715)  Weight - Scale: 48 5 kg (107 lb) (03/05/18 1510)  SpO2: 93 % (03/05/18 1715)     No intake or output data in the 24 hours ending 03/05/18 1731         Invasive Devices            Peripheral Intravenous Line                     Peripheral IV 03/05/18 Right Forearm less than 1 day      Peripheral IV 03/05/18 Right;Ventral (anterior) Antecubital less than 1 day                     Physical Exam   Constitutional: He appears well-developed and well-nourished  Eyes: Pupils are equal, round, and reactive to light  No scleral icterus  Neck: Neck supple  No JVD present  Cardiovascular: Normal rate and regular rhythm  Pulmonary/Chest: He is in respiratory distress  He has no wheezes  He has no rales  Abdominal: Soft  Bowel sounds are normal  He exhibits no distension  There is no tenderness  There is no rebound  Musculoskeletal: Normal range of motion  He exhibits no edema  Neurological: He is alert  No cranial nerve deficit  Skin: Skin is warm and dry  He is not diaphoretic   No erythema          Lab Results:         Lab Results   Component Value Date     WBC 12 20 (H) 03/05/2018     HGB 14 9 03/05/2018     HCT 46 1 03/05/2018     MCV 95 03/05/2018      03/05/2018            Lab Results   Component Value Date     GLUCOSE 102 03/05/2018     CALCIUM 8 9 03/05/2018      03/05/2018     K 3 9 03/05/2018     CO2 34 (H) 03/05/2018      03/05/2018     BUN 12 03/05/2018     CREATININE 0 69 03/05/2018         Imaging:   XR chest 1 view portable   Final Result       Emphysema without radiographic evidence of interval decompensation               Workstation performed: [id number]                 EKG, Pathology, and Other Studies: normal sinus rhythm        Code Status: Prior  Advance Directive and Living Will: Yes    Power of :    POLST:       Counseling / Coordination of Care:

## 2018-03-05 NOTE — PROGRESS NOTES
Progress note - Pulmonary Medicine   Gene Glaser 74 y o  male MRN: [id number]       Impression & Plan:     Acute on chronic respiratory failure with hypoxia ([facility] [address] )  · Needs ER eval  · I discussed with Dr Gissell Cazares in the emergency department who will evaluate him there    COPD with acute exacerbation ([facility] [address] )  · Increased cough and mucus production has felt poorly over the last several days  Increasing bronchodilator use  Using his supplemental oxygen at and turning up the oxygen at home as well  · Saturations on arrival here 78 percent on 2 L  Required 6 L and rest to bring him up to the low 90 percent range    Tachycardia  · Pulse feels regular to palpation but still remains quite tachycardic  Heart rate 130s on arrival   · Needs ED evaluation given the constellation of pulmonary and cardiac findings    Tobacco abuse  · Continues to smoke heavily up to 3 packs daily when feeling well  Currently smoking 1 pack per day despite feeling poorly  ______________________________________________________________________    HPI:    Gene Glaser presents today for follow-up of COPD/emphysema and ongoing tobacco abuse  He was also identified as having a pulmonary nodule on his last CT scan of the chest during hospitalization in November  He continues to smoke heavily  He smokes 2-3 packs a day of cigarettes normally and when he is sick cut that down to 1-1 and half packs per day  He has been feeling poorly recently  He has been coughing and producing a lot more phlegm  He has felt feverish but never took his temperature  He does not have chest pain or pleurisy  He denies any leg swelling  He continues to lose weight and is very cachectic at baseline  He denies any sick contacts  He did not have any abdominal pain or GERD symptoms  He does have a history of dysphagia  He has a 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 and weakness in the left lower extremity    He has not had headache or visual change  Review of Systems:  Review of Systems   Constitutional: Positive for unexpected weight change (Continues to lose weight)  As per HPI   HENT: Positive for postnasal drip and rhinorrhea  Negative for sinus pressure and sore throat  As per HPI   Eyes: Negative  Respiratory:        As per HPI   Cardiovascular:        AS PER HPI   Gastrointestinal: Negative for abdominal pain  No GERD symptoms   Endocrine: Negative  Genitourinary: Negative  Musculoskeletal: Positive for gait problem  Skin: Negative  Allergic/Immunologic: Negative for environmental allergies  Neurological:        As per HPI   Hematological: Negative  Psychiatric/Behavioral: Negative  All other systems reviewed and are negative  Past medical history, surgical history, and family history were reviewed and updated as appropriate    Social history updates:  History   Smoking Status    Heavy Tobacco Smoker    Packs/day: 1 50    Years: 59 00   Smokeless Tobacco    Never Used     Comment: 1 5 packs per day       PhysicalExamination:  Vitals:   /86   Pulse (!) 136   SpO2 (!) 78%      After being placed on oxygen up to 6 L, saturations were able to recover to the low 90 percent range  Coughing and expectorating mucus  Gen:  Hunched over and cachectic appearing  HEENT: PERRL  Oropharynx is clear, moist   Multiple missing teeth  Neck: Supple  There is no JVD, lymphadenopathy or thyromegaly  Trachea is midline  Chest:  Moderate thoracic kyphosis  Chest excursion symmetric  Lungs are hyperinflated  Breath sounds are distant bilaterally with a few coarse breath sounds  Hyper-resonant to percussion  No appreciable wheeze  Cardiac:  Tachycardic but regular to palpation  There are no murmurs  Abdomen: Soft and nontender  Benign  Extremities: No clubbing, cyanosis or edema  Neurologic:  Chronic left arm Paralysis and left lower extremity weakness  Normal affect  Extremely hard of hearing  Skin: No appreciable rashes  Diagnostic Data:  Labs: I personally reviewed the most recent laboratory data pertinent to today's visit    Lab Results   Component Value Date    WBC 10 52 (H) 11/10/2017    HGB 12 4 11/10/2017    HCT 39 0 11/10/2017    MCV 95 11/10/2017     11/10/2017     Lab Results   Component Value Date    GLUCOSE 101 11/10/2017    CALCIUM 8 7 11/10/2017     11/10/2017    K 4 4 11/10/2017    CO2 35 (H) 11/10/2017     11/10/2017    BUN 10 11/10/2017    CREATININE 0 69 11/10/2017     No results found for: IGE  Lab Results   Component Value Date    ALT 26 11/10/2017    AST 20 11/10/2017    ALKPHOS 54 11/10/2017    BILITOT 0 30 11/10/2017       Imaging:  I personally reviewed the images on the [facility] system pertinent to today's visit  CT from November shows a 6 mm right upper lobe nodule  There are extensive emphysema changes          Christo Fitch MD

## 2018-03-05 NOTE — LETTER
Discharge Summary - [facility]     Patient Information: Funmilayo Coello 74 y o  male MRN: [id number]  Unit/Bed#: 427-01 Encounter: [id number]     Discharging Physician / Practitioner: Chau Mclain DO  PCP: Minerva Mancini PA-C  Admission Date: 3/5/2018  Discharge Date: 03/10/18     Disposition:      Home with VNA Services (Reminder: Complete face to face encounter)     Reason for Admission:      The patient is a 74year old male known to me from previous hospitalization who presents to the ED from the office of his pulmonologist (Dr Radhika White)  He complains of increasing and worsening cough, and bringing up green yellow sputum  Has been turning up his home o2 and using his inhalers more than prescribed  He usually wears 2L but was noted to be saturating in the 70s in pulmonary office where he required being placed on 6L to bring sats up to the Yvetta Ruse continues to smoke up to 3 packs a day             Discharge Diagnoses:      Principal Problem:    Acute on chronic respiratory failure with hypoxia and hypercapnia (HCC)  Active Problems:    Vitamin D deficiency    COPD with acute exacerbation (HCC)    Viral sepsis ([facility] [address] )    Acute tracheobronchitis    Tobacco abuse    BMI less than 19,adult    Severe protein-calorie malnutrition (HCC)    Hypoalbuminemia    Rhinovirus infection    Intractable abdominal pain  Resolved Problems: Moderate protein-calorie malnutrition [facility])           Hospital Course:      Funmilayo Coello is a 74 y o  male patient who originally presented to the hospital on 3/5/2018 due to acute on chronic respiratory failure with hypoxia and hypercapnia  Patient was treated for acute COPD exacerbation, suspected acute tracheobronchitis, likely with sepsis secondary to viral pathogen present on admission, viral panel positive for acute rhino virus infection      Treatment plan while hospitalized    1)  Acute on chronic respiratory failure with hypoxia and hypercapnia/COPD with acute exacerbation/Acute tracheobronchitis/Viral sepsis (present on admission) secondary to Rhinovirus infection:   patient was treated with IV antibiotics and IV steroids  Continue supplemental oxygen to maintain oxygen saturation levels of 90% and above        2) patient had intractable abdominal pain on 3/9/2018, currently resolved, CT scan unremarkable for any acute pathology, only significant finding is moderate stool         3)  Severe protein-calorie malnutrition/BMI less than 19/BMI=16 83/Hypoalbuminemia:  Continue the nutritional supplements per the Dietitian's recommendations     4)  Tobacco abuse:  Nicotine patch   Smoking cessation counseling      5)  Pulmonary nodule: Venus Mcbride will need outpatient surveillance imaging     6)  Vitamin D deficiency:  For the vitamin D deficiency, the patient was placed on Ergocalciferol 50,000 I  U  PO Qweekly for 8 weekly doses  Venus Mcbride will then need a repeat Vitamin D 25-OH level checked in 2 months and will need a daily Cholecalciferol supplement prescribed by her PCP based on the repeat Vitamin D 25-OH level results      Patient with history of chronic back use, smokes 3 packs cigarettes per day, smoking cessation advised      Patient be discharged on continued oral prednisone taper            Condition at Discharge: good      Discharge Day Visit / Exam:      Subjective:  No acute complaints  Vitals: Blood Pressure: 124/62 (03/10/18 0729)  Pulse: 85 (03/10/18 0729)  Temperature: 98 9 °F (37 2 °C) (03/10/18 0729)  Temp Source: Temporal (03/10/18 0729)  Respirations: 18 (03/10/18 0729)  Height: 5' 6" (167 6 cm) (03/05/18 2117)  Weight - Scale: 47 3 kg (104 lb 4 4 oz) (03/05/18 2117)  SpO2: 96 % (2 l/m) (03/10/18 0740)  Exam:   Physical Exam   Constitutional: He is oriented to person, place, and time  No distress  Pulmonary/Chest: Effort normal  No respiratory distress  He has no wheezes  He has no rales  Musculoskeletal: Normal range of motion   He exhibits no edema, tenderness or deformity  Neurological: He is alert and oriented to person, place, and time  No cranial nerve deficit  Coordination normal    Skin: He is not diaphoretic  Psychiatric: He has a normal mood and affect  His behavior is normal  Judgment and thought content normal    Nursing note and vitals reviewed         Discharge instructions/Information to patient and family:   See after visit summary for information provided to patient and family        Provisions for Follow-Up Care:  See after visit summary for information related to follow-up care and any pertinent home health orders        Planned Readmission: no     Discharge Statement:  I spent 35 minutes discharging the patient  This time was spent on the day of discharge  I had direct contact with the patient on the day of discharge  Greater than 50% of the total time was spent examining patient, answering all patient questions, arranging and discussing plan of care with patient as well as directly providing post-discharge instructions    Additional time then spent on discharge activities      Discharge Medications:  See after visit summary for reconciled discharge medications provided to patient and family        ** Please Note: This note has been constructed using a voice recognition system **                    CC: No Recipients    Progress Notes:

## 2018-03-05 NOTE — ASSESSMENT & PLAN NOTE
· Pulse feels regular to palpation but still remains quite tachycardic    Heart rate 130s on arrival   · Needs ED evaluation given the constellation of pulmonary and cardiac findings

## 2018-03-05 NOTE — ASSESSMENT & PLAN NOTE
· Needs ER eval  · I discussed with Dr Elaine Atwood in the emergency department who will evaluate him there

## 2018-03-05 NOTE — ED PROVIDER NOTES
History  Chief Complaint   Patient presents with    Shortness of Breath     Increased shortness of breath for months  Patient was seen at his pulmonologist today and sent to the ED becasue he ahd an oxygen sat in the 70's  Patient: Carrie Parsons y o /male  YOB: 1945  MRN: [id number]  PCP: Mushtaq Jimenez PA-C  Date of evaluation: 3/5/2018    (N B  [address] may have been used in the preparation of this document )    This patient is sent down to the emergency department from Dr Toyin Bruno office due to respiratory distress with an oxygen saturation in the high 70s and a heart rate about 140  The patient is an extremely poor historian  He can't say if he has felt any worse than usual recently  He has no idea what his usual home O2 setting is  He doesn't know if he has had a fever  He can't say if he has had any change in his activity  All history is from Dr Tanmay Anderson and from [facility] notes  Prior to Admission Medications   Prescriptions Last Dose Informant Patient Reported? Taking?    Incontinence Supply Disposable ([employer] CLE[employer] WIPES) MISC   Yes No   Sig: [employer] Cleans[employer] Wipes Miscellaneous use as directed  Quantity: 1;  Refills: 5    Kavon Juan CRNP;  Started 12-Nov-2015 [id number] Miscellaneous Package (3 Packages)   Incontinence Supply Disposable (DEPEND GUARDS FOR MEN) MISC   Yes No   Sig: Depend Guards for Men Miscellaneous use as directed  Quantity: 1;  Refills: 5    Kavon Juan CRNP;  Started 12-Nov-2015 [id number] Miscellaneous Package (2 Packages)   Incontinence Supply Disposable (DEPEND UNDERGARMENTS) MISC   Yes No   Sig: Depend Undergarments Miscellaneous use as directed  Quantity: 100;  Refills: 5    Kavon Juan CRNP;  Started 12-Nov-2015 Active   Ipratropium-Albuterol (COMBIVENT RESPIMAT)  MCG/ACT AERS   Yes No   Sig: Combivent Respimat  MCG/ACT Inhalation Aerosol Solution INHALE 1 PUFF 4 TIMES DAILY (MAXIMUM OF 6 PUFFS IN 24 HOURS) Quantity: 1;  Refills: 3    Vernell HITCHCOCK; Active   Mometasone Furo-Formoterol Fum (DULERA) 200-5 MCG/ACT AERO   Yes No   Sig: Dulera 200-5 MCG/ACT Inhalation Aerosol INHALE 2 PUFFS TWICE DAILY  RINSE MOUTH AFTER USE  Quantity: 0;  Refills: 0   , M D ; Active   cyanocobalamin 250 MCG tablet 3/5/2018 at Unknown time  No Yes   Sig: Take 1 tablet by mouth daily   dextromethorphan-guaiFENesin (ROBITUSSIN DM)  mg/5 mL syrup 3/5/2018 at Unknown time  No Yes   Sig: Take 10 mL by mouth every 4 (four) hours as needed for cough   ergocalciferol (VITAMIN D2) 50,000 units   No No   Sig: Take 1 capsule by mouth once a week for 7 doses   folic acid (FOLVITE) 1 mg tablet 3/5/2018 at Unknown time  No Yes   Sig: Take 1 tablet by mouth daily   ipratropium-albuterol (COMBIVENT RESPIMAT) inhaler 3/5/2018 at Unknown time Self Yes Yes   Sig: Inhale 1 puff every 6 (six) hours   predniSONE 20 mg tablet 3/5/2018 at Unknown time  No Yes   Sig: Take 2 tablets daily and decrease by 1/2 tablet every 4 days      Facility-Administered Medications: None       Past Medical History:   Diagnosis Date    Anemia     Asthma     [facility] [address] [facility]     Coronary artery disease     Heart disease     Left wrist injury     Low testosterone     [facility]     Vitamin D deficiency        History reviewed  No pertinent surgical history  History reviewed  No pertinent family history  I have reviewed and agree with the history as documented      Social History   Substance Use Topics    Smoking status: Heavy Tobacco Smoker     Packs/day: 1 00     Years: 59 00     Types: Cigarettes    Smokeless tobacco: Never Used    Alcohol use No        Review of Systems    Physical Exam  ED Triage Vitals   Temperature Pulse Respirations Blood Pressure SpO2   03/05/18 1510 03/05/18 1513 03/05/18 1510 03/05/18 1510 03/05/18 1510   99 2 °F (37 3 °C) (!) 119 (!) 24 140/86 97 %      Temp Source Heart Rate Source Patient Position - Orthostatic VS BP Location FiO2 (%)   03/05/18 1510 03/06/18 0719 03/05/18 1510 03/05/18 1510 --   Temporal Monitor Sitting Left arm       Pain Score       03/05/18 1510       No Pain           Orthostatic Vital Signs  Vitals:    03/07/18 0026 03/07/18 0715 03/07/18 1531 03/07/18 2341   BP: 109/60 108/64 100/58 97/55   Pulse: 93 92 90 101   Patient Position - Orthostatic VS: Lying Lying Lying Lying       Physical Exam   Constitutional: He is oriented to person, place, and time  He appears well-developed and well-nourished  HENT:   Mouth/Throat: Oropharynx is clear and moist and mucous membranes are normal    Voice normal   Eyes: EOM are normal  Pupils are equal, round, and reactive to light  Cardiovascular: Normal rate and regular rhythm  Pulmonary/Chest: Accessory muscle usage present  Tachypnea noted  He has decreased breath sounds  Abdominal: Soft  Bowel sounds are normal    Neurological: He is alert and oriented to person, place, and time  GCS eye subscore is 4  GCS verbal subscore is 5  GCS motor subscore is 6  Skin: Skin is warm and dry  Psychiatric: He has a normal mood and affect  His speech is normal and behavior is normal    Nursing note and vitals reviewed        ED Medications  Medications   folic acid (FOLVITE) tablet 1 mg (1 mg Oral Given 3/7/18 0805)   cyanocobalamin (VITAMIN B-12) tablet 250 mcg (250 mcg Oral Given 3/7/18 0805)   nicotine (NICODERM CQ) 21 mg/24 hr TD 24 hr patch 1 patch (1 patch Transdermal Medication Applied 3/7/18 0803)   enoxaparin (LOVENOX) subcutaneous injection 40 mg (40 mg Subcutaneous Given 3/7/18 0806)   methylPREDNISolone sodium succinate (Solu-MEDROL) injection 40 mg (40 mg Intravenous Given 3/7/18 2012)   levofloxacin (LEVAQUIN) IVPB (premix) 750 mg (750 mg Intravenous New Bag 3/7/18 2017)   guaiFENesin (ROBITUSSIN) oral solution 200 mg (200 mg Oral Given 3/7/18 2012)   sodium chloride 0 9 % infusion (100 mL/hr Intravenous New Bag 3/7/18 7258) levalbuterol (XOPENEX) inhalation solution 1 25 mg (1 25 mg Nebulization Given 3/7/18 2049)   ipratropium (ATROVENT) 0 02 % inhalation solution 0 5 mg (0 5 mg Nebulization Given 3/7/18 2049)   albuterol inhalation solution 10 mg (10 mg Nebulization Given 3/5/18 1529)   ipratropium (ATROVENT) 0 02 % inhalation solution 1 mg (1 mg Nebulization Given 3/5/18 1529)   sodium chloride 0 9 % inhalation solution 3 mL (3 mL Nebulization Given 3/5/18 1529)   methylPREDNISolone sodium succinate (Solu-MEDROL) injection 125 mg (125 mg Intravenous Given 3/5/18 1529)   sodium chloride 0 9 % infusion (0 mL/hr Intravenous Stopped 3/6/18 0832)   dextromethorphan-guaiFENesin (ROBITUSSIN DM)  mg/5 mL oral syrup 10 mL (10 mL Oral Given 3/7/18 1150)       Diagnostic Studies  Results Reviewed     Procedure Component Value Units Date/Time    Blood culture [[id number]] Collected:  03/05/18 1559    Lab Status:  Preliminary result Specimen:  Blood from Arm, Left Updated:  03/07/18 2201     Blood Culture No Growth at 48 hrs  Blood culture [[id number]] Collected:  03/05/18 1521    Lab Status:  Preliminary result Specimen:  Blood from Arm, Right Updated:  03/07/18 2201     Blood Culture No Growth at 48 hrs  Urinalysis with culture and sensitivity reflex [[id number]]  (Abnormal) Collected:  03/06/18 0638    Lab Status:  Final result Specimen:  Urine from Urine, Clean Catch Updated:  03/06/18 0700     Color, UA Toma     Clarity, UA Slightly Cloudy     Specific Gravity, UA >=1 030     pH, UA 6 0     Leukocytes, UA Negative     Nitrite, UA Negative     Protein, UA Trace (A) mg/dl      Glucose, UA Negative mg/dl      Ketones, UA 15 (1+) (A) mg/dl      Urobilinogen, UA 4 0 (A) E U /dl      Bilirubin, UA Interference- unable to analyze (A)     Blood, UA Trace-Intact    [place] draw [[id number]] Collected:  03/05/18 1525    Lab Status:  Final result Specimen:  Blood Updated:  03/05/18 1701    Narrative:        The following orders were created for panel order [place] draw  Procedure                               Abnormality         Status                     ---------                               -----------         ------                     Iven Baker / Black tube on [facility][[id number]]                        Final result                 Please view results for these tests on the individual orders  Comprehensive metabolic panel [[id number]]  (Abnormal) Collected:  03/05/18 1521    Lab Status:  Final result Specimen:  Blood from Arm, Right Updated:  03/05/18 1631     Sodium 138 mmol/L      Potassium 3 9 mmol/L      Chloride 101 mmol/L      CO2 34 (H) mmol/L      Anion Gap 3 (L) mmol/L      BUN 12 mg/dL      Creatinine 0 69 mg/dL      Glucose 102 mg/dL      Calcium 8 9 mg/dL      AST 22 U/L      ALT 21 U/L      Alkaline Phosphatase 79 U/L      Total Protein 7 7 g/dL      Albumin 2 9 (L) g/dL      Total Bilirubin 0 90 mg/dL      eGFR 94 ml/min/1 73sq m     Narrative:         National Kidney Disease Education Program recommendations are as follows:  GFR calculation is accurate only with a steady state creatinine  Chronic Kidney disease less than 60 ml/min/1 73 sq  meters  Kidney failure less than 15 ml/min/1 73 sq  meters  Magnesium [[id number]]  (Normal) Collected:  03/05/18 1521    Lab Status:  Final result Specimen:  Blood from Arm, Right Updated:  03/05/18 1631     Magnesium 2 0 mg/dL     Lactate Blood [[id number]]  (Normal) Collected:  03/05/18 1521    Lab Status:  Final result Specimen:  Blood from Arm, Right Updated:  03/05/18 1600     LACTIC ACID 1 1 mmol/L     Narrative:         Result may be elevated if tourniquet was used during collection      Blood gas, arterial [[id number]]  (Abnormal) Collected:  03/05/18 1532    Lab Status:  Final result Specimen:  Blood, Arterial from Radial, Right Updated:  03/05/18 1547     pH, Arterial 7 344 (L)     pCO2, Arterial 63 8 (HH) mm Hg      pO2, Arterial 104 7 mm Hg      HCO3, Arterial 34 0 (H) mmol/L      Base Excess, Arterial 6 0 mmol/L      O2 Content, Arterial 20 1 mL/dL      O2 HGB,Arterial  96 8 %      SOURCE Radial, Right     WICHO TEST Yes     Nasal Cannula 4    Protime-INR [[id number]]  (Abnormal) Collected:  03/05/18 1521    Lab Status:  Final result Specimen:  Blood from Arm, Right Updated:  03/05/18 1543     Protime 14 7 (H) seconds      INR 1 16    APTT [[id number]]  (Abnormal) Collected:  03/05/18 1521    Lab Status:  Final result Specimen:  Blood from Arm, Right Updated:  03/05/18 1543     PTT 36 (H) seconds     Narrative: Therapeutic Heparin Range = 60-90 seconds    CBC and differential [[id number]]  (Abnormal) Collected:  03/05/18 1521    Lab Status:  Final result Specimen:  Blood from Arm, Right Updated:  03/05/18 1537     WBC 12 20 (H) Thousand/uL      RBC 4 87 Million/uL      Hemoglobin 14 9 g/dL      Hematocrit 46 1 %      MCV 95 fL      MCH 30 6 pg      MCHC 32 3 g/dL      RDW 12 9 %      MPV 10 3 fL      Platelets 532 Thousands/uL      Neutrophils Relative 76 (H) %      Lymphocytes Relative 11 (L) %      Monocytes Relative 11 %      Eosinophils Relative 2 %      Basophils Relative 0 %      Neutrophils Absolute 9 28 (H) Thousands/µL      Lymphocytes Absolute 1 36 Thousands/µL      Monocytes Absolute 1 34 (H) Thousand/µL      Eosinophils Absolute 0 18 Thousand/µL      Basophils Absolute 0 04 Thousands/µL     POCT troponin [[id number]]  (Normal) Collected:  03/05/18 1518    Lab Status:  Final result Updated:  03/05/18 1532     POC Troponin I 0 00 ng/ml      Specimen Type VENOUS    Narrative:         [employer] i-Stat handheld analyzer 99% cutoff is > 0 08ng/mL in [facility] Emergency Departments    o cTnI 99% cutoff is useful only when applied to patients in the clinical setting of myocardial ischemia  o cTnI 99% cutoff should be interpreted in the context of clinical history, ECG findings and possibly cardiac imaging to establish correct diagnosis    o cTnI 99% cutoff may be suggestive but clearly not indicative of a coronary event without the clinical setting of myocardial ischemia  XR chest 1 view portable   Final Result by Aura Beltran MD (03/05 1533)      Emphysema without radiographic evidence of interval decompensation            Workstation performed: [id number]                    Procedures  Procedures       Phone Contacts  ED Phone Contact    ED Course  ED Course as of Mar 08 0017   Mon Mar 05, 2018   1657 At this time pt looks and feels better  D/W Dr Uli Velazquez  Pt is very unreliable in caring for himself at home  He is an extremely poor historian  He makes changes to his O2 therapy at his whim  He continues to smoke heavily  He will need  close observation and pulmonary regimen overnight  HEART Risk Score    Flowsheet Row Most Recent Value   History  0 Filed at: 03/05/2018 2327   ECG  0 Filed at: 03/05/2018 2327   Age  2 Filed at: 03/05/2018 2327   Risk Factors  1 Filed at: 03/05/2018 2327   Troponin  0 Filed at: 03/05/2018 2327   Heart Score Risk Calculator   History  0 Filed at: 03/05/2018 2327   ECG  0 Filed at: 03/05/2018 2327   Age  2 Filed at: 03/05/2018 2327   Risk Factors  1 Filed at: 03/05/2018 2327   Troponin  0 Filed at: 03/05/2018 2327   HEART Score  3 Filed at: 03/05/2018 2327   HEART Score  3 Filed at: 03/05/2018 2327                            MDM  CritCare Time    Disposition  Final diagnoses:   Acute dyspnea     Time reflects when diagnosis was documented in both MDM as applicable and the Disposition within this note     Time User Action Codes Description Comment    3/5/2018  5:13 PM Holly CRUZ Add [R06 00] Acute dyspnea       ED Disposition     ED Disposition Condition Comment    Admit  Case was discussed with Dr Johanna Sanchez [MD] for [facility]   and the patient's admission status was agreed to be Admission Status: inpatient status to the service of Dr Isa Santiago             Follow-up Information    None       Current Discharge Medication List      CONTINUE these medications which have NOT CHANGED    Details   cyanocobalamin 250 MCG tablet Take 1 tablet by mouth daily  Qty: 30 tablet, Refills: 0      dextromethorphan-guaiFENesin (ROBITUSSIN DM)  mg/5 mL syrup Take 10 mL by mouth every 4 (four) hours as needed for cough  Qty: 118 mL, Refills: 1      folic acid (FOLVITE) 1 mg tablet Take 1 tablet by mouth daily  Qty: 30 tablet, Refills: 3      !! ipratropium-albuterol (COMBIVENT RESPIMAT) inhaler Inhale 1 puff every 6 (six) hours      predniSONE 20 mg tablet Take 2 tablets daily and decrease by 1/2 tablet every 4 days  Qty: 40 tablet, Refills: 0      ergocalciferol (VITAMIN D2) 50,000 units Take 1 capsule by mouth once a week for 7 doses  Qty: 7 capsule, Refills: 0      !! Incontinence Supply Disposable ([employer] CLEANSING WIPES) MISC [employer] Cleansing Wipes Miscellaneous use as directed  Quantity: 1;  Refills: 5    Mino Lentz;  Started 12-Nov-2015 [id number] Miscellaneous Package (3 Packages)      !! Incontinence Supply Disposable (DEPEND GUARDS FOR MEN) MISC Depend Guards for Men Miscellaneous use as directed  Quantity: 1;  Refills: 5    Harlo Detroit CRNP;  Started 12-Nov-2015 [id number] Miscellaneous Package (2 Packages)      !! Incontinence Supply Disposable (DEPEND UNDERGARMENTS) MISC Depend Undergarments Miscellaneous use as directed  Quantity: 100;  Refills: 5    Mino Lentz;  Started 12-Nov-2015 Active      !! Ipratropium-Albuterol (COMBIVENT RESPIMAT)  MCG/ACT AERS Combivent Respimat  MCG/ACT Inhalation Aerosol Solution INHALE 1 PUFF 4 TIMES DAILY (MAXIMUM OF 6 PUFFS IN 24 HOURS)  Quantity: 1;  Refills: 3    Harlo Detroit CRNP; Active      Mometasone Furo-Formoterol Fum (DULERA) 200-5 MCG/ACT AERO Dulera 200-5 MCG/ACT Inhalation Aerosol INHALE 2 PUFFS TWICE DAILY  RINSE MOUTH AFTER USE  Quantity: 0;  Refills: 0   , M D ; Active       !! - Potential duplicate medications found  Please discuss with provider          No discharge procedures on file     ED Provider  Electronically Signed by           Sheldon Rose MD  03/08/18 1972

## 2018-03-05 NOTE — ASSESSMENT & PLAN NOTE
· Continues to smoke heavily up to 3 packs daily when feeling well  Currently smoking 1 pack per day despite feeling poorly

## 2018-03-06 LAB
ANION GAP SERPL CALCULATED.3IONS-SCNC: 8 MMOL/L (ref 4–13)
BACTERIA UR QL AUTO: ABNORMAL /HPF
BILIRUB UR QL STRIP: ABNORMAL
BUN SERPL-MCNC: 19 MG/DL (ref 5–25)
CALCIUM SERPL-MCNC: 8.9 MG/DL (ref 8.3–10.1)
CHLORIDE SERPL-SCNC: 103 MMOL/L (ref 100–108)
CLARITY UR: ABNORMAL
CO2 SERPL-SCNC: 27 MMOL/L (ref 21–32)
COLOR UR: ABNORMAL
CREAT SERPL-MCNC: 0.81 MG/DL (ref 0.6–1.3)
ERYTHROCYTE [DISTWIDTH] IN BLOOD BY AUTOMATED COUNT: 12.6 % (ref 11.6–15.1)
GFR SERPL CREATININE-BSD FRML MDRD: 88 ML/MIN/1.73SQ M
GLUCOSE SERPL-MCNC: 126 MG/DL (ref 65–140)
GLUCOSE UR STRIP-MCNC: NEGATIVE MG/DL
HCT VFR BLD AUTO: 40.6 % (ref 36.5–49.3)
HGB BLD-MCNC: 13.2 G/DL (ref 12–17)
HGB UR QL STRIP.AUTO: ABNORMAL
KETONES UR STRIP-MCNC: ABNORMAL MG/DL
LEUKOCYTE ESTERASE UR QL STRIP: NEGATIVE
MAGNESIUM SERPL-MCNC: 2.1 MG/DL (ref 1.6–2.6)
MCH RBC QN AUTO: 30.7 PG (ref 26.8–34.3)
MCHC RBC AUTO-ENTMCNC: 32.5 G/DL (ref 31.4–37.4)
MCV RBC AUTO: 94 FL (ref 82–98)
NITRITE UR QL STRIP: NEGATIVE
NON-SQ EPI CELLS URNS QL MICRO: ABNORMAL /HPF
PH UR STRIP.AUTO: 6 [PH] (ref 4.5–8)
PHOSPHATE SERPL-MCNC: 3 MG/DL (ref 2.3–4.1)
PLATELET # BLD AUTO: 236 THOUSANDS/UL (ref 149–390)
PMV BLD AUTO: 10.6 FL (ref 8.9–12.7)
POTASSIUM SERPL-SCNC: 4.4 MMOL/L (ref 3.5–5.3)
PROT UR STRIP-MCNC: ABNORMAL MG/DL
RBC # BLD AUTO: 4.3 MILLION/UL (ref 3.88–5.62)
RBC #/AREA URNS AUTO: ABNORMAL /HPF
SODIUM SERPL-SCNC: 138 MMOL/L (ref 136–145)
SP GR UR STRIP.AUTO: >=1.03 (ref 1–1.03)
TROPONIN I SERPL-MCNC: <0.02 NG/ML
UROBILINOGEN UR QL STRIP.AUTO: 4 E.U./DL
WBC # BLD AUTO: 8.83 THOUSAND/UL (ref 4.31–10.16)
WBC #/AREA URNS AUTO: ABNORMAL /HPF

## 2018-03-06 PROCEDURE — 84484 ASSAY OF TROPONIN QUANT: CPT | Performed by: FAMILY MEDICINE

## 2018-03-06 PROCEDURE — 99232 SBSQ HOSP IP/OBS MODERATE 35: CPT | Performed by: FAMILY MEDICINE

## 2018-03-06 PROCEDURE — 81001 URINALYSIS AUTO W/SCOPE: CPT | Performed by: EMERGENCY MEDICINE

## 2018-03-06 PROCEDURE — 83735 ASSAY OF MAGNESIUM: CPT | Performed by: FAMILY MEDICINE

## 2018-03-06 PROCEDURE — 84100 ASSAY OF PHOSPHORUS: CPT | Performed by: FAMILY MEDICINE

## 2018-03-06 PROCEDURE — 80048 BASIC METABOLIC PNL TOTAL CA: CPT | Performed by: FAMILY MEDICINE

## 2018-03-06 PROCEDURE — 85027 COMPLETE CBC AUTOMATED: CPT | Performed by: FAMILY MEDICINE

## 2018-03-06 PROCEDURE — 94760 N-INVAS EAR/PLS OXIMETRY 1: CPT

## 2018-03-06 PROCEDURE — 94640 AIRWAY INHALATION TREATMENT: CPT

## 2018-03-06 RX ADMIN — METHYLPREDNISOLONE SODIUM SUCCINATE 40 MG: 40 INJECTION, POWDER, FOR SOLUTION INTRAMUSCULAR; INTRAVENOUS at 08:23

## 2018-03-06 RX ADMIN — LEVALBUTEROL HYDROCHLORIDE 1.25 MG: 1.25 SOLUTION, CONCENTRATE RESPIRATORY (INHALATION) at 07:18

## 2018-03-06 RX ADMIN — ENOXAPARIN SODIUM 40 MG: 40 INJECTION SUBCUTANEOUS at 08:23

## 2018-03-06 RX ADMIN — IPRATROPIUM BROMIDE 0.5 MG: 0.5 SOLUTION RESPIRATORY (INHALATION) at 07:18

## 2018-03-06 RX ADMIN — LEVOFLOXACIN 750 MG: 5 INJECTION, SOLUTION INTRAVENOUS at 20:17

## 2018-03-06 RX ADMIN — LEVALBUTEROL HYDROCHLORIDE 1.25 MG: 1.25 SOLUTION, CONCENTRATE RESPIRATORY (INHALATION) at 13:32

## 2018-03-06 RX ADMIN — METHYLPREDNISOLONE SODIUM SUCCINATE 40 MG: 40 INJECTION, POWDER, FOR SOLUTION INTRAMUSCULAR; INTRAVENOUS at 20:17

## 2018-03-06 RX ADMIN — CYANOCOBALAMIN TAB 500 MCG 250 MCG: 500 TAB at 08:29

## 2018-03-06 RX ADMIN — LEVALBUTEROL HYDROCHLORIDE 1.25 MG: 1.25 SOLUTION, CONCENTRATE RESPIRATORY (INHALATION) at 19:27

## 2018-03-06 RX ADMIN — FOLIC ACID 1 MG: 1 TABLET ORAL at 08:23

## 2018-03-06 RX ADMIN — IPRATROPIUM BROMIDE 0.5 MG: 0.5 SOLUTION RESPIRATORY (INHALATION) at 19:27

## 2018-03-06 RX ADMIN — IPRATROPIUM BROMIDE 0.5 MG: 0.5 SOLUTION RESPIRATORY (INHALATION) at 13:31

## 2018-03-06 NOTE — ED NOTES
Pt has no complaints at this time  No sob/pain  Pt stated he feels much better        Ena Vaca, RN  03/05/18 8198

## 2018-03-06 NOTE — ED NOTES
Pt laying in bed comfortably  No sob  Call bell in reach  Family at bedside  No complaints at this time        Roberto Driver RN  03/05/18 2017

## 2018-03-06 NOTE — ED NOTES
Pt laying in bed comfortably  No complaints at this time  Call bell in reach        Kerry Richards RN  03/05/18 2016

## 2018-03-06 NOTE — ED NOTES
Verbal order from dr Tavon Estevez for 2L of 02  No SOB at this time   Pt saturation at 97%     Natalie Durand RN  03/05/18 9496

## 2018-03-06 NOTE — ASSESSMENT & PLAN NOTE
· POA  · Initially with tachypnea, tachycardia, leukocytosis, now resolved - most likely due to acute respiratory failure   Flu panel pending  · Monitor CBC/clinically  · Await blood cx

## 2018-03-06 NOTE — ED NOTES
Pt laying in bed with no complaints at this time  No sob  Call bell in reach  Family at bedside        Natalie Durand RN  03/05/18 7064

## 2018-03-06 NOTE — CASE MANAGEMENT
Initial Clinical Review    Admission: Date/Time/Statement: 3/5/18 @ 1712     Orders Placed This Encounter   Procedures    Inpatient Admission (expected length of stay for this patient is greater than two midnights)     Standing Status:   Standing     Number of Occurrences:   1     Order Specific Question:   Admitting Physician     Answer:   Allan Redd     Order Specific Question:   Level of Care     Answer:   Med Surg [16]     Order Specific Question:   Estimated length of stay     Answer:   More than 2 Midnights     Order Specific Question:   Certification     Answer:   I certify that inpatient services are medically necessary for this patient for a duration of greater than two midnights  See H&P and MD Progress Notes for additional information about the patient's course of treatment  ED: Date/Time/Mode of Arrival:   ED Arrival Information     Expected Arrival Acuity Means of Arrival Escorted By Service Admission Type    - 3/5/2018 15:06 Emergent Wheelchair Other General Medicine Emergency    Arrival Complaint    -          Chief Complaint:   Chief Complaint   Patient presents with    Shortness of Breath     Increased shortness of breath for months  Patient was seen at his pulmonologist today and sent to the ED becasue he ahd an oxygen sat in the 70's  History of Illness:   74year old male known to me from previous hospitalization who presents to the ED from the office of his pulmonologist (Dr Izabela Beard)  He complains of increasing and worsening cough, and bringing up green yellow sputum  Has been turning up his home o2 and using his inhalers more than prescribed  He usually wears 2L but was noted to be saturating in the 70s in pulmonary office where he required being placed on 6L to bring sats up to the 90s  He continues to smoke up to 3 packs a day  Respiratory: Positive for cough, shortness of breath and wheezing        Malnutrition Characteristics: Fat loss, Muscle loss, Weight loss (Severe fat depletion in triceps; Severe muscle loss in clavicle region; Weight loss 12% in 3 months )  bmi  Body mass index is 16 83 kg/m²  03/05/18 1730 --  110 21 107/59 90 % -- BK   03/05/18 1715 -- 105 21 -- 93 % -- BK   03/05/18 1700 --  109 22 106/59 93 % -- BK   03/05/18 1645 --  110 22 -- 91 % -- BK   03/05/18 1630 --  117 22 114/57 93 % -- BK   03/05/18 1615 --  123 20 -- 94 % -- BK   03/05/18 1600 --  115 18 115/62 97 % -- BK   03/05/18 1530 --  114 22 -- 95 % -- BK   03/05/18 1513 --  119 -- -- -- --    03/05/18 1510 99 2 °F (37 3 °C) --  24 140/86 97 % 48 5 kg (107 lb)    03/05/18 1448 --  136 -- 156/86  78 % -- AS     03/05/18 2015 94 % -- -- -- -- BK   03/05/18 1730 90 % -- -- -- -- BK   03/05/18 1715 93 % -- -- -- -- BK   03/05/18 1700 93 % -- -- -- -- BK   03/05/18 1645 91 % -- -- -- -- BK   03/05/18 1630 93 % -- -- -- -- BK   03/05/18 1615 94 % -- -- Nasal cannula 2 L/min BK   03/05/18 1600 97 % -- -- -- -- BK   03/05/18 1530 95 % -- -- -- -- BK   03/05/18 1515 -- -- -- Nasal cannula 6 L/min BK   03/05/18 1510 97 % -- -- Nasal cannula 6 L/min    03/05/18 1448  78 % -- -- -- -- AS           Abnormal Labs/Diagnostic Test Results:   cxr =    mphysema without radiographic evidence of interval decompensation                 ED Treatment:   Medication Administration from 03/05/2018 1500 to 03/05/2018 2045       Date/Time Order Dose Route Action Action by Comments     03/05/2018 1529 albuterol inhalation solution 10 mg 10 mg Nebulization Given Lavonne Chew RN      03/05/2018 1529 ipratropium (ATROVENT) 0 02 % inhalation solution 1 mg 1 mg Nebulization Given Yoanna Botello RN                 03/05/2018 1529 methylPREDNISolone sodium succinate (Solu-MEDROL) injection 125 mg 125 mg Intravenous Given Yoanna Botello RN           Past Medical/Surgical History:    Active Ambulatory Problems     Diagnosis Date Noted    Vitamin D deficiency     COPD with acute exacerbation ([facility] [address] )  Anemia     SIRS (systemic inflammatory response syndrome) (HCC) 05/04/2016    Tobacco abuse 10/04/2016    Low TSH level 10/05/2016    Low serum prealbumin 10/05/2016    BMI less than 19,adult 10/06/2016    Hypoalbuminemia 11/06/2017    Hyperbilirubinemia 11/06/2017    Dysphagia 11/07/2017    Low serum triiodothyronine (T3) 11/08/2017    Microscopic hematuria 11/08/2017    Moderate protein-calorie malnutrition ([address] ) 11/08/2017    Pain of right heel 11/09/2017    Renal cyst, right 11/09/2017    Acute on chronic respiratory failure with hypoxia (HCC) 03/05/2018    Tachycardia 03/05/2018    Pulmonary nodule 03/05/2018     Resolved Ambulatory Problems     Diagnosis Date Noted    Heart disease     Testosterone deficiency 05/04/2016    Pulmonary emphysema (HCC) 05/04/2016    Leukocytosis 05/04/2016    Nicotine dependence 05/04/2016    Acute tracheobronchitis 05/04/2016    History of iron deficiency anemia 05/04/2016    History of stroke 05/04/2016    Intractable nausea and vomiting 10/04/2016    Malnutrition of moderate degree ([address] ) 10/06/2016    Chest pain 10/06/2016    Hypokalemia 11/07/2017    Hypophosphatemia 11/09/2017    Abnormal chest x-ray 11/09/2017     Past Medical History:   Diagnosis Date    Anemia     Asthma     [facility]chronic obstructive pulmonary disease) [facility])     Coronary artery disease     Heart disease     Left wrist injury     Low testosterone     Stroke ([facility] [address] )     Vitamin D deficiency        Admitting Diagnosis: SOB (shortness of breath) [R06 02]  Acute dyspnea [R06 00]    Assessment/Plan:   Acute on chronic respiratory failure with hypoxia and hypercapnia secondary to acute exacerbation of Chronic obstructive pulmonary disease   Solumedrol 40mg Q12, Levaquin, initiate respiratory protocol  Blood cultures obtained x2, check sputum culture and respiratory pathogen profile  Check troponin x 2   Nicotine patch     LOS > 2 midnights  Full Code  Lovenox       Admission Orders:  Admit telemetry  Up w/assist  Reg diet  xopenex nebs TID    @ 2117  HR  106    RR 20    Sat 94% on 2L NC    Scheduled Meds:   Current Facility-Administered Medications:  cyanocobalamin 250 mcg Oral Daily Dali Blake MD    enoxaparin 40 mg Subcutaneous Daily Dali Blake MD    folic acid 1 mg Oral Daily Dali Blake MD    ipratropium 0 5 mg Nebulization TID Kinza Fernandez MD    levalbuterol 1 25 mg Nebulization TID Kinza Fernandez MD    levofloxacin 750 mg Intravenous Q24H Dali Blake MD Last Rate: 750 mg (03/05/18 2149)   methylPREDNISolone sodium succinate 40 mg Intravenous Q12H Brianna Gonsales MD    nicotine 1 patch Transdermal Daily Kinza Fernandez MD    sodium chloride (PF) 3 mL Intravenous PRN Cydney Akins MD          3/6/2018  98 8    86    20    107/59    96% on 2L NC    COPD with acute exacerbation (HCC)   Assessment & Plan     · Improved  · Continue steroids and scheduled nebs  · Outpatient Pulmonology f/u          Moderate protein-calorie malnutrition ([facility] [address] )   Assessment & Plan     Malnutrition Findings:   Malnutrition type: Chronic illness  Degree of Malnutrition: Other severe protein calorie malnutrition     BMI Findings:  BMI Classifications: Underweight < 18 5     Body mass index is 16 83 kg/m²       Nutrition input appreciated          Tobacco abuse   Assessment & Plan     · Cessation counseling          SIRS (systemic inflammatory response syndrome) ([facility])   Assessment & Plan     · POA  · Initially with tachypnea, tachycardia, leukocytosis, now resolved - most likely due to acute respiratory failure   Flu panel pending  · Monitor CBC/clinically  · Await blood cx           Certification Statement: The patient will continue to require additional inpatient hospital stay due to need for close monitoring, IV steroids

## 2018-03-06 NOTE — MALNUTRITION/BMI
This medical record reflects one or more clinical indicators suggestive of malnutrition  Malnutrition Findings:   Malnutrition type: Chronic illness  Degree of Malnutrition: Other severe protein calorie malnutrition  Malnutrition Characteristics: Fat loss, Muscle loss, Weight loss (Severe fat depletion in triceps; Severe muscle loss in clavicle region; Weight loss 12% in 3 months )    BMI Findings:  BMI Classifications: Underweight < 18 5     Body mass index is 16 83 kg/m²  See Nutrition note dated 03/06/18 for additional details  Completed nutrition assessment is viewable in the nutrition documentation

## 2018-03-06 NOTE — ED PROCEDURE NOTE
PROCEDURE  ECG 12 Lead Documentation  Date/Time: 3/5/2018 3:15 PM  Performed by: Beatrice Esparza by: Carmen ActiveEonmadelaine     Indications / Diagnosis:  Sob  ECG reviewed by me, the ED Provider: yes    Patient location:  ED  Previous ECG:     Comparison to cardiac monitor: Yes    Interpretation:     Interpretation: non-specific    Quality:     Tracing quality:  Limited by artifact  Rate:     ECG rate:  116    ECG rate assessment: tachycardic    Rhythm:     Rhythm: sinus tachycardia    QRS:     QRS axis:  Left  ST segments:     ST segments:  Normal  T waves:     T waves: normal           Néstor Doan MD  03/05/18 5641

## 2018-03-06 NOTE — PROGRESS NOTES
Progress Note - Liyah Ayers 1945, 74 y o  male MRN: [id number]    Unit/Bed#: 427-01 Encounter: [id number]    Primary Care Provider: Sis Diamond PA-C   Date and time admitted to hospital: 3/5/2018  3:06 PM        COPD with acute exacerbation ([facility] )   Assessment & Plan    · Improved  · Continue steroids and scheduled nebs  · Outpatient Pulmonology f/u        Moderate protein-calorie malnutrition ([facility] )   Assessment & Plan    Malnutrition Findings:   Malnutrition type: Chronic illness  Degree of Malnutrition: Other severe protein calorie malnutrition    BMI Findings:  BMI Classifications: Underweight < 18 5     Body mass index is 16 83 kg/m²  Nutrition input appreciated        Tobacco abuse   Assessment & Plan    · Cessation counseling        SIRS (systemic inflammatory response syndrome) (HCC)   Assessment & Plan    · POA  · Initially with tachypnea, tachycardia, leukocytosis, now resolved - most likely due to acute respiratory failure  Flu panel pending  · Monitor CBC/clinically  · Await blood cx          VTE Pharmacologic Prophylaxis:   Pharmacologic: Enoxaparin (Lovenox)  Mechanical VTE Prophylaxis in Place: Yes    Patient Centered Rounds: I have performed bedside rounds with nursing staff today  Discussions with Specialists or Other Care Team Provider: Multidisciplinary team    Education and Discussions with Family / Patient: Patient    Time Spent for Care: 30 minutes  More than 50% of total time spent on counseling and coordination of care as described above  Current Length of Stay: 1 day(s)    Current Patient Status: Inpatient   Certification Statement: The patient will continue to require additional inpatient hospital stay due to need for close monitoring, IV steroids    Discharge Plan: 1-2 days    Code Status: Level 1 - Full Code      Subjective:   Patient seen and examined  He reports feeling significantly improved in his respiratory status  Reports decrease in cough   Denies chest pain  Denies abdominal pain, nausea, vomiting, diarrhea  Denies chills or fevers  Objective:     Vitals:   Temp (24hrs), Av 6 °F (37 °C), Min:97 8 °F (36 6 °C), Max:99 2 °F (37 3 °C)    HR:  [] 86  Resp:  [18-24] 20  BP: ()/(57-86) 107/59  SpO2:  [78 %-97 %] 95 %  Body mass index is 16 83 kg/m²  Input and Output Summary (last 24 hours): Intake/Output Summary (Last 24 hours) at 18 1412  Last data filed at 18 1235   Gross per 24 hour   Intake          1313 33 ml   Output              225 ml   Net          1088 33 ml       Physical Exam:     Physical Exam   Constitutional: He is oriented to person, place, and time  No distress  HENT:   Head: Normocephalic and atraumatic  Eyes: Conjunctivae are normal    Neck: No JVD present  Cardiovascular: Normal rate and regular rhythm  No murmur heard  Pulmonary/Chest: No respiratory distress  He has wheezes (scattered)  He has rales (diffuse)  Abdominal: Soft  He exhibits no distension  There is no tenderness  There is no guarding  Musculoskeletal: He exhibits no edema  Neurological: He is alert and oriented to person, place, and time  Skin: Skin is warm and dry  Psychiatric: He has a normal mood and affect  Additional Data:     Labs:      Results from last 7 days  Lab Units 18  0624  18  1521   WBC Thousand/uL 8 83  --  12 20*   HEMOGLOBIN g/dL 13 2  --  14 9   HEMATOCRIT % 40 6  --  46 1   PLATELETS Thousands/uL 236  < > 259   NEUTROS PCT %  --   --  76*   LYMPHS PCT %  --   --  11*   MONOS PCT %  --   --  11   EOS PCT %  --   --  2   < > = values in this interval not displayed      Results from last 7 days  Lab Units 18  0624 18  1521   SODIUM mmol/L 138 138   POTASSIUM mmol/L 4 4 3 9   CHLORIDE mmol/L 103 101   CO2 mmol/L 27 34*   BUN mg/dL 19 12   CREATININE mg/dL 0 81 0 69   CALCIUM mg/dL 8 9 8 9   TOTAL PROTEIN g/dL  --  7 7   BILIRUBIN TOTAL mg/dL  --  0 90   ALK PHOS U/L  --  79   ALT U/L  --  21   AST U/L  --  22   GLUCOSE RANDOM mg/dL 126 102       Results from last 7 days  Lab Units 03/05/18  1521   INR  1 16       * I Have Reviewed All Lab Data Listed Above  * Additional Pertinent Lab Tests Reviewed: [address] Admission Reviewed    Imaging:    Imaging Reports Reviewed Today Include: chest xray    Recent Cultures (last 7 days):           Last 24 Hours Medication List:     Current Facility-Administered Medications:  cyanocobalamin 250 mcg Oral Daily Dali Blake MD    enoxaparin 40 mg Subcutaneous Daily Dali Blake MD    folic acid 1 mg Oral Daily Dali Blake MD    ipratropium 0 5 mg Nebulization TID Milka Sanchez MD    levalbuterol 1 25 mg Nebulization TID Milka Sanchez MD    levofloxacin 750 mg Intravenous Q24H Dali Blake MD Last Rate: 750 mg (03/05/18 2149)   methylPREDNISolone sodium succinate 40 mg Intravenous Q12H Yanelis Prince MD    nicotine 1 patch Transdermal Daily Milka Sanchez MD    sodium chloride (PF) 3 mL Intravenous PRN Jose Ramirez MD         Today, Patient Was Seen By: Silvano Mancilla MD    ** Please Note: Dictation voice to text software may have been used in the creation of this document   **

## 2018-03-06 NOTE — ASSESSMENT & PLAN NOTE
Malnutrition Findings:   Malnutrition type: Chronic illness  Degree of Malnutrition: Other severe protein calorie malnutrition    BMI Findings:  BMI Classifications: Underweight < 18 5     Body mass index is 16 83 kg/m²       Nutrition input appreciated

## 2018-03-06 NOTE — ED NOTES
Pt laying in bed with no complaints at this time  NO sob/chest pain at this time  Pt on 02 nasal canula  O2 saturation 93%  Call bell in reach        Memo Manley RN  03/05/18 3169

## 2018-03-06 NOTE — SOCIAL WORK
Cm met with the patient to assess the  functional status prior to the admission  Cm reviewed the discharge check list with the patient and answered any questions  The patient is aware that if they have any questions related to the d/c plan they can have cm review the plan with them at any time  Cm will continue to follow and  develop a safe plan to address all needs and concerns they the patient may have  I did review the case management d/c paper with the patient  The patient tells me that he lives in [place] with John Ronald and she is the contact for the patient  He wears o2 at home and he told me that he wears it 90% of the time and he has 2 nebulizers  He told me that he has no medication for the nebs  He is in a multi story home with the bed and bath on the 2nd floor  He told me that he goes upstairs to the bathroom and he does not use the bsc  He said that he has an aide 3 x a week he does not know where the aide is from Zechariah Lee drives him he told me that he can drive but he has no insurance

## 2018-03-07 PROBLEM — E44.0 MODERATE PROTEIN-CALORIE MALNUTRITION (HCC): Status: RESOLVED | Noted: 2017-11-08 | Resolved: 2018-03-07

## 2018-03-07 PROBLEM — E80.6 HYPERBILIRUBINEMIA: Status: RESOLVED | Noted: 2017-11-06 | Resolved: 2018-03-07

## 2018-03-07 PROBLEM — J96.22 ACUTE ON CHRONIC RESPIRATORY FAILURE WITH HYPOXIA AND HYPERCAPNIA (HCC): Status: ACTIVE | Noted: 2018-03-05

## 2018-03-07 PROBLEM — R00.0 TACHYCARDIA: Status: RESOLVED | Noted: 2018-03-05 | Resolved: 2018-03-07

## 2018-03-07 PROBLEM — M79.671 PAIN OF RIGHT HEEL: Status: RESOLVED | Noted: 2017-11-09 | Resolved: 2018-03-07

## 2018-03-07 PROBLEM — R79.89 LOW SERUM TRIIODOTHYRONINE (T3): Status: RESOLVED | Noted: 2017-11-08 | Resolved: 2018-03-07

## 2018-03-07 PROBLEM — B34.8 RHINOVIRUS INFECTION: Status: ACTIVE | Noted: 2018-03-07

## 2018-03-07 LAB
ADENOVIRUS: NOT DETECTED
ALBUMIN SERPL BCP-MCNC: 2.8 G/DL (ref 3.5–5)
ALP SERPL-CCNC: 70 U/L (ref 46–116)
ALT SERPL W P-5'-P-CCNC: 16 U/L (ref 12–78)
ANION GAP SERPL CALCULATED.3IONS-SCNC: 8 MMOL/L (ref 4–13)
AST SERPL W P-5'-P-CCNC: 14 U/L (ref 5–45)
ATRIAL RATE: 116 BPM
BASOPHILS # BLD MANUAL: 0 THOUSAND/UL (ref 0–0.1)
BASOPHILS NFR MAR MANUAL: 0 % (ref 0–1)
BILIRUB SERPL-MCNC: 0.3 MG/DL (ref 0.2–1)
BUN SERPL-MCNC: 17 MG/DL (ref 5–25)
C PNEUM DNA SPEC QL NAA+PROBE: NOT DETECTED
CALCIUM SERPL-MCNC: 9.3 MG/DL (ref 8.3–10.1)
CHLORIDE SERPL-SCNC: 102 MMOL/L (ref 100–108)
CO2 SERPL-SCNC: 31 MMOL/L (ref 21–32)
CREAT SERPL-MCNC: 0.78 MG/DL (ref 0.6–1.3)
EOSINOPHIL # BLD MANUAL: 0 THOUSAND/UL (ref 0–0.4)
EOSINOPHIL NFR BLD MANUAL: 0 % (ref 0–6)
ERYTHROCYTE [DISTWIDTH] IN BLOOD BY AUTOMATED COUNT: 12.9 % (ref 11.6–15.1)
FLUAV H1 RNA SPEC QL NAA+PROBE: NOT DETECTED
FLUAV H3 RNA SPEC QL NAA+PROBE: NOT DETECTED
FLUAV RNA SPEC QL NAA+PROBE: NOT DETECTED
FLUBV RNA SPEC QL NAA+PROBE: NOT DETECTED
GFR SERPL CREATININE-BSD FRML MDRD: 89 ML/MIN/1.73SQ M
GLUCOSE SERPL-MCNC: 124 MG/DL (ref 65–140)
HBOV DNA SPEC QL NAA+PROBE: NOT DETECTED
HCOV 229E RNA SPEC QL NAA+PROBE: NOT DETECTED
HCOV HKU1 RNA SPEC QL NAA+PROBE: NOT DETECTED
HCOV NL63 RNA SPEC QL NAA+PROBE: NOT DETECTED
HCOV OC43 RNA SPEC QL NAA+PROBE: NOT DETECTED
HCT VFR BLD AUTO: 44 % (ref 36.5–49.3)
HGB BLD-MCNC: 14.3 G/DL (ref 12–17)
HPIV1 RNA SPEC QL NAA+PROBE: NOT DETECTED
HPIV2 RNA SPEC QL NAA+PROBE: NOT DETECTED
HPIV3 RNA SPEC QL NAA+PROBE: NOT DETECTED
HPIV4 RNA SPEC QL NAA+PROBE: NOT DETECTED
LYMPHOCYTES # BLD AUTO: 0.25 THOUSAND/UL (ref 0.6–4.47)
LYMPHOCYTES # BLD AUTO: 2 % (ref 14–44)
M PNEUMO DNA SPEC QL NAA+PROBE: NOT DETECTED
MCH RBC QN AUTO: 31.1 PG (ref 26.8–34.3)
MCHC RBC AUTO-ENTMCNC: 32.5 G/DL (ref 31.4–37.4)
MCV RBC AUTO: 96 FL (ref 82–98)
METAPNEUMOVIRUS: NOT DETECTED
MONOCYTES # BLD AUTO: 0.25 THOUSAND/UL (ref 0–1.22)
MONOCYTES NFR BLD: 2 % (ref 4–12)
NEUTROPHILS # BLD MANUAL: 12.2 THOUSAND/UL (ref 1.85–7.62)
NEUTS SEG NFR BLD AUTO: 96 % (ref 43–75)
P AXIS: 84 DEGREES
PLATELET # BLD AUTO: 284 THOUSANDS/UL (ref 149–390)
PLATELET BLD QL SMEAR: ADEQUATE
PMV BLD AUTO: 10.9 FL (ref 8.9–12.7)
POTASSIUM SERPL-SCNC: 4.7 MMOL/L (ref 3.5–5.3)
PR INTERVAL: 142 MS
PROT SERPL-MCNC: 7.1 G/DL (ref 6.4–8.2)
QRS AXIS: -50 DEGREES
QRSD INTERVAL: 76 MS
QT INTERVAL: 318 MS
QTC INTERVAL: 442 MS
RBC # BLD AUTO: 4.6 MILLION/UL (ref 3.88–5.62)
RHINOVIRUS RNA SPEC QL NAA+PROBE: DETECTED
RSV A RNA SPEC QL NAA+PROBE: NOT DETECTED
RSV B RNA SPEC QL NAA+PROBE: NOT DETECTED
SODIUM SERPL-SCNC: 141 MMOL/L (ref 136–145)
T WAVE AXIS: 83 DEGREES
TOTAL CELLS COUNTED SPEC: 100
VENTRICULAR RATE: 116 BPM
WBC # BLD AUTO: 12.71 THOUSAND/UL (ref 4.31–10.16)

## 2018-03-07 PROCEDURE — 94640 AIRWAY INHALATION TREATMENT: CPT

## 2018-03-07 PROCEDURE — 93010 ELECTROCARDIOGRAM REPORT: CPT | Performed by: INTERNAL MEDICINE

## 2018-03-07 PROCEDURE — 85027 COMPLETE CBC AUTOMATED: CPT | Performed by: HOSPITALIST

## 2018-03-07 PROCEDURE — 87205 SMEAR GRAM STAIN: CPT | Performed by: FAMILY MEDICINE

## 2018-03-07 PROCEDURE — 87070 CULTURE OTHR SPECIMN AEROBIC: CPT | Performed by: FAMILY MEDICINE

## 2018-03-07 PROCEDURE — 94760 N-INVAS EAR/PLS OXIMETRY 1: CPT

## 2018-03-07 PROCEDURE — 94762 N-INVAS EAR/PLS OXIMTRY CONT: CPT

## 2018-03-07 PROCEDURE — 99232 SBSQ HOSP IP/OBS MODERATE 35: CPT | Performed by: INTERNAL MEDICINE

## 2018-03-07 PROCEDURE — 85007 BL SMEAR W/DIFF WBC COUNT: CPT | Performed by: HOSPITALIST

## 2018-03-07 PROCEDURE — 80053 COMPREHEN METABOLIC PANEL: CPT | Performed by: HOSPITALIST

## 2018-03-07 RX ORDER — SODIUM CHLORIDE 9 MG/ML
50 INJECTION, SOLUTION INTRAVENOUS CONTINUOUS
Status: DISCONTINUED | OUTPATIENT
Start: 2018-03-07 | End: 2018-03-10 | Stop reason: HOSPADM

## 2018-03-07 RX ORDER — GUAIFENESIN 100 MG/5ML
200 SOLUTION ORAL EVERY 6 HOURS PRN
Status: DISCONTINUED | OUTPATIENT
Start: 2018-03-07 | End: 2018-03-10 | Stop reason: HOSPADM

## 2018-03-07 RX ORDER — SODIUM CHLORIDE FOR INHALATION 0.9 %
3 VIAL, NEBULIZER (ML) INHALATION
Status: DISCONTINUED | OUTPATIENT
Start: 2018-03-07 | End: 2018-03-07

## 2018-03-07 RX ORDER — GUAIFENESIN/DEXTROMETHORPHAN 100-10MG/5
10 SYRUP ORAL ONCE
Status: COMPLETED | OUTPATIENT
Start: 2018-03-07 | End: 2018-03-07

## 2018-03-07 RX ORDER — LEVALBUTEROL 1.25 MG/.5ML
1.25 SOLUTION, CONCENTRATE RESPIRATORY (INHALATION)
Status: DISCONTINUED | OUTPATIENT
Start: 2018-03-07 | End: 2018-03-10 | Stop reason: HOSPADM

## 2018-03-07 RX ADMIN — GUAIFENESIN 200 MG: 100 SOLUTION ORAL at 01:21

## 2018-03-07 RX ADMIN — IPRATROPIUM BROMIDE 0.5 MG: 0.5 SOLUTION RESPIRATORY (INHALATION) at 20:49

## 2018-03-07 RX ADMIN — GUAIFENESIN 200 MG: 100 SOLUTION ORAL at 20:12

## 2018-03-07 RX ADMIN — NICOTINE 1 PATCH: 21 PATCH, EXTENDED RELEASE TRANSDERMAL at 08:03

## 2018-03-07 RX ADMIN — GUAIFENESIN AND DEXTROMETHORPHAN 10 ML: 100; 10 SYRUP ORAL at 11:50

## 2018-03-07 RX ADMIN — LEVALBUTEROL 1.25 MG: 1.25 SOLUTION, CONCENTRATE RESPIRATORY (INHALATION) at 20:49

## 2018-03-07 RX ADMIN — METHYLPREDNISOLONE SODIUM SUCCINATE 40 MG: 40 INJECTION, POWDER, FOR SOLUTION INTRAMUSCULAR; INTRAVENOUS at 08:06

## 2018-03-07 RX ADMIN — SODIUM CHLORIDE 100 ML/HR: 0.9 INJECTION, SOLUTION INTRAVENOUS at 14:38

## 2018-03-07 RX ADMIN — IPRATROPIUM BROMIDE 0.5 MG: 0.5 SOLUTION RESPIRATORY (INHALATION) at 09:46

## 2018-03-07 RX ADMIN — CYANOCOBALAMIN TAB 500 MCG 250 MCG: 500 TAB at 08:05

## 2018-03-07 RX ADMIN — ENOXAPARIN SODIUM 40 MG: 40 INJECTION SUBCUTANEOUS at 08:06

## 2018-03-07 RX ADMIN — LEVALBUTEROL HYDROCHLORIDE 1.25 MG: 1.25 SOLUTION, CONCENTRATE RESPIRATORY (INHALATION) at 09:46

## 2018-03-07 RX ADMIN — METHYLPREDNISOLONE SODIUM SUCCINATE 40 MG: 40 INJECTION, POWDER, FOR SOLUTION INTRAMUSCULAR; INTRAVENOUS at 20:12

## 2018-03-07 RX ADMIN — FOLIC ACID 1 MG: 1 TABLET ORAL at 08:05

## 2018-03-07 RX ADMIN — LEVOFLOXACIN 750 MG: 5 INJECTION, SOLUTION INTRAVENOUS at 20:17

## 2018-03-07 NOTE — PROGRESS NOTES
Progress Note - Sunday Urena 74 y o  male MRN: [id number]    Unit/Bed#: 427-01 Encounter: [id number]      Assessment:  Patient Active Problem List   Diagnosis    COPD with acute exacerbation (HCC)    Acute tracheobronchitis    Tobacco abuse    Low serum prealbumin    BMI less than 19,adult    Severe protein-calorie malnutrition (HCC)    Hypoalbuminemia    Dysphagia    Microscopic hematuria    Renal cyst, right    Acute on chronic respiratory failure with hypoxia and hypercapnia (HCC)    Pulmonary nodule    Rhinovirus infection         Plan:  1)  Acute on chronic respiratory failure with hypoxia and hypercapnia/COPD with acute exacerbation/Acute tracheobronchitis/Rhinovirus infection:  Continue IV Methylprednisolone  Continue IV Levofloxacin for a 5-day antibiotic course  Continue the respiratory protocol  Continue supplemental oxygen to maintain oxygen saturation levels of 90% and above  Initiate continuous pulse oximetry and the airway clearance protocol  PT/OT  If the patient has any change in his respiratory status or mental status, he will need an ABG checked  2)  Severe protein-calorie malnutrition/BMI less than 19/BMI=16 83/Hypoalbuminemia:  Continue the nutritional supplements per the Dietitian's recommendations  3)  Tobacco abuse:  Nicotine patch  Smoking cessation counseling  4)  Pulmonary nodule: He will need outpatient surveillance imaging  Subjective:   Patient seen and examined  The patient's shortness of breath is improving  No chest pain  Objective:     Vitals: Blood pressure 108/64, pulse 92, temperature 98 9 °F (37 2 °C), temperature source Temporal, resp  rate 20, height 5' 6" (1 676 m), weight 47 3 kg (104 lb 4 4 oz), SpO2 95 %  ,Body mass index is 16 83 kg/m²    Weight (last 2 days)     Date/Time   Weight    03/05/18 2117  47 3 (104 28)    03/05/18 1510  48 5 (107)              Intake/Output Summary (Last 24 hours) at 03/07/18 1340  Last data filed at 03/07/18 1256   Gross per 24 hour   Intake              420 ml   Output              475 ml   Net              -55 ml       Physical Exam: General:  NAD, awake, follows commands  HEENT:  NC/AT, mucous membranes dry  Neck:  Supple, No JVP elevation  CV:  + S1, + S2, RRR  Pulm:  Expiratory wheezing bilaterally, Decreased breath sounds bilaterally  Abd:  Soft, Non-tender, Non-distended  Ext:  No clubbing/cyanosis/edema    Scheduled Meds:  Current Facility-Administered Medications:  cyanocobalamin 250 mcg Oral Daily Dali Blake MD    enoxaparin 40 mg Subcutaneous Daily Dali Blake MD    folic acid 1 mg Oral Daily Dali Blake MD    guaiFENesin 200 mg Oral Q6H PRN Blease Harish DO    ipratropium 0 5 mg Nebulization TID Pili Fernández MD    levalbuterol 1 25 mg Nebulization TID Pili Fernández MD    levofloxacin 750 mg Intravenous Q24H Dali Blake MD Last Rate: 750 mg (03/06/18 2017)   methylPREDNISolone sodium succinate 40 mg Intravenous Q12H [address] Dali Blake MD    nicotine 1 patch Transdermal Daily Dali Blake MD      Continuous Infusions:   PRN Meds: guaiFENesin      Invasive Devices     Peripheral Intravenous Line            Peripheral IV 03/05/18 Right Wrist 1 day                  Results from last 7 days  Lab Units 03/07/18 0445 03/06/18 0624 03/05/18  2116 03/05/18  1521   WBC Thousand/uL 12 71* 8 83  --  12 20*   HEMOGLOBIN g/dL 14 3 13 2  --  14 9   HEMATOCRIT % 44 0 40 6  --  46 1   PLATELETS Thousands/uL 284 236 243 259   NEUTROS PCT %  --   --   --  76*   MONOS PCT %  --   --   --  11   MONO PCT MAN % 2*  --   --   --          Results from last 7 days  Lab Units 03/07/18 0445 03/06/18 0624 03/05/18  1521   SODIUM mmol/L 141 138 138   POTASSIUM mmol/L 4 7 4 4 3 9   CHLORIDE mmol/L 102 103 101   CO2 mmol/L 31 27 34*   BUN mg/dL 17 19 12   CREATININE mg/dL 0 78 0 81 0 69   CALCIUM mg/dL 9 3 8 9 8 9   TOTAL PROTEIN g/dL 7 1  --  7 7   BILIRUBIN TOTAL mg/dL 0 30  --  0 90   ALK PHOS U/L 70  --  79   ALT U/L 16 --  21   AST U/L 14  --  22   GLUCOSE RANDOM mg/dL 124 126 102       Lab Results   Component Value Date    CKTOTAL 51 11/06/2017    TROPONINI <0 02 03/06/2018       Lab Results   Component Value Date    INR 1 16 03/05/2018    INR 1 22 (H) 11/05/2017    INR 1 14 10/04/2016    PROTIME 14 7 (H) 03/05/2018    PROTIME 15 3 (H) 11/05/2017    PROTIME 14 2 10/04/2016                 Lab, Imaging and other studies: I have personally reviewed pertinent reports      VTE Pharmacologic Prophylaxis: Enoxaparin (Lovenox)  VTE Mechanical Prophylaxis: sequential compression device

## 2018-03-08 LAB
25(OH)D3 SERPL-MCNC: 13.5 NG/ML (ref 30–100)
ALBUMIN SERPL BCP-MCNC: 2.4 G/DL (ref 3.5–5)
ALP SERPL-CCNC: 53 U/L (ref 46–116)
ALT SERPL W P-5'-P-CCNC: 12 U/L (ref 12–78)
ANION GAP SERPL CALCULATED.3IONS-SCNC: 6 MMOL/L (ref 4–13)
AST SERPL W P-5'-P-CCNC: 12 U/L (ref 5–45)
BACTERIA UR QL AUTO: NORMAL /HPF
BASOPHILS # BLD AUTO: 0 THOUSANDS/ΜL (ref 0–0.1)
BASOPHILS NFR BLD AUTO: 0 % (ref 0–1)
BILIRUB SERPL-MCNC: 0.3 MG/DL (ref 0.2–1)
BILIRUB UR QL STRIP: NEGATIVE
BUN SERPL-MCNC: 18 MG/DL (ref 5–25)
CALCIUM SERPL-MCNC: 8.5 MG/DL (ref 8.3–10.1)
CHLORIDE SERPL-SCNC: 103 MMOL/L (ref 100–108)
CK SERPL-CCNC: 20 U/L (ref 39–308)
CLARITY UR: CLEAR
CO2 SERPL-SCNC: 29 MMOL/L (ref 21–32)
COLOR UR: YELLOW
CREAT SERPL-MCNC: 0.73 MG/DL (ref 0.6–1.3)
EOSINOPHIL # BLD AUTO: 0 THOUSAND/ΜL (ref 0–0.61)
EOSINOPHIL NFR BLD AUTO: 0 % (ref 0–6)
ERYTHROCYTE [DISTWIDTH] IN BLOOD BY AUTOMATED COUNT: 12.8 % (ref 11.6–15.1)
GFR SERPL CREATININE-BSD FRML MDRD: 91 ML/MIN/1.73SQ M
GLUCOSE SERPL-MCNC: 119 MG/DL (ref 65–140)
GLUCOSE UR STRIP-MCNC: NEGATIVE MG/DL
HCT VFR BLD AUTO: 40.7 % (ref 36.5–49.3)
HGB BLD-MCNC: 13.1 G/DL (ref 12–17)
HGB UR QL STRIP.AUTO: NEGATIVE
KETONES UR STRIP-MCNC: NEGATIVE MG/DL
LACTATE SERPL-SCNC: 1.8 MMOL/L (ref 0.5–2)
LEUKOCYTE ESTERASE UR QL STRIP: NEGATIVE
LYMPHOCYTES # BLD AUTO: 0.79 THOUSANDS/ΜL (ref 0.6–4.47)
LYMPHOCYTES NFR BLD AUTO: 9 % (ref 14–44)
MAGNESIUM SERPL-MCNC: 1.9 MG/DL (ref 1.6–2.6)
MCH RBC QN AUTO: 30.7 PG (ref 26.8–34.3)
MCHC RBC AUTO-ENTMCNC: 32.2 G/DL (ref 31.4–37.4)
MCV RBC AUTO: 95 FL (ref 82–98)
MONOCYTES # BLD AUTO: 0.51 THOUSAND/ΜL (ref 0.17–1.22)
MONOCYTES NFR BLD AUTO: 6 % (ref 4–12)
NEUTROPHILS # BLD AUTO: 8.03 THOUSANDS/ΜL (ref 1.85–7.62)
NEUTS SEG NFR BLD AUTO: 85 % (ref 43–75)
NITRITE UR QL STRIP: NEGATIVE
NON-SQ EPI CELLS URNS QL MICRO: NORMAL /HPF
PH UR STRIP.AUTO: 6.5 [PH] (ref 4.5–8)
PHOSPHATE SERPL-MCNC: 3.3 MG/DL (ref 2.3–4.1)
PLATELET # BLD AUTO: 256 THOUSANDS/UL (ref 149–390)
PMV BLD AUTO: 10.4 FL (ref 8.9–12.7)
POTASSIUM SERPL-SCNC: 4.9 MMOL/L (ref 3.5–5.3)
PREALB SERPL-MCNC: 15.5 MG/DL (ref 18–40)
PROT SERPL-MCNC: 6 G/DL (ref 6.4–8.2)
PROT UR STRIP-MCNC: NEGATIVE MG/DL
RBC # BLD AUTO: 4.27 MILLION/UL (ref 3.88–5.62)
RBC #/AREA URNS AUTO: NORMAL /HPF
SODIUM SERPL-SCNC: 138 MMOL/L (ref 136–145)
SP GR UR STRIP.AUTO: 1.01 (ref 1–1.03)
TSH SERPL DL<=0.05 MIU/L-ACNC: 0.57 UIU/ML (ref 0.36–3.74)
UROBILINOGEN UR QL STRIP.AUTO: 0.2 E.U./DL
WBC # BLD AUTO: 9.33 THOUSAND/UL (ref 4.31–10.16)
WBC #/AREA URNS AUTO: NORMAL /HPF

## 2018-03-08 PROCEDURE — G8996 SWALLOW CURRENT STATUS: HCPCS | Performed by: SPEECH-LANGUAGE PATHOLOGIST

## 2018-03-08 PROCEDURE — G8988 SELF CARE GOAL STATUS: HCPCS

## 2018-03-08 PROCEDURE — 80053 COMPREHEN METABOLIC PANEL: CPT | Performed by: INTERNAL MEDICINE

## 2018-03-08 PROCEDURE — G8997 SWALLOW GOAL STATUS: HCPCS | Performed by: SPEECH-LANGUAGE PATHOLOGIST

## 2018-03-08 PROCEDURE — 97167 OT EVAL HIGH COMPLEX 60 MIN: CPT

## 2018-03-08 PROCEDURE — 92610 EVALUATE SWALLOWING FUNCTION: CPT | Performed by: SPEECH-LANGUAGE PATHOLOGIST

## 2018-03-08 PROCEDURE — 94762 N-INVAS EAR/PLS OXIMTRY CONT: CPT

## 2018-03-08 PROCEDURE — 97530 THERAPEUTIC ACTIVITIES: CPT

## 2018-03-08 PROCEDURE — 87086 URINE CULTURE/COLONY COUNT: CPT | Performed by: INTERNAL MEDICINE

## 2018-03-08 PROCEDURE — 83605 ASSAY OF LACTIC ACID: CPT | Performed by: INTERNAL MEDICINE

## 2018-03-08 PROCEDURE — G8987 SELF CARE CURRENT STATUS: HCPCS

## 2018-03-08 PROCEDURE — 82306 VITAMIN D 25 HYDROXY: CPT | Performed by: INTERNAL MEDICINE

## 2018-03-08 PROCEDURE — 84100 ASSAY OF PHOSPHORUS: CPT | Performed by: INTERNAL MEDICINE

## 2018-03-08 PROCEDURE — 94760 N-INVAS EAR/PLS OXIMETRY 1: CPT

## 2018-03-08 PROCEDURE — 94640 AIRWAY INHALATION TREATMENT: CPT

## 2018-03-08 PROCEDURE — 97163 PT EVAL HIGH COMPLEX 45 MIN: CPT | Performed by: PHYSICAL THERAPIST

## 2018-03-08 PROCEDURE — 83735 ASSAY OF MAGNESIUM: CPT | Performed by: INTERNAL MEDICINE

## 2018-03-08 PROCEDURE — 82550 ASSAY OF CK (CPK): CPT | Performed by: INTERNAL MEDICINE

## 2018-03-08 PROCEDURE — 84443 ASSAY THYROID STIM HORMONE: CPT | Performed by: INTERNAL MEDICINE

## 2018-03-08 PROCEDURE — 84134 ASSAY OF PREALBUMIN: CPT | Performed by: INTERNAL MEDICINE

## 2018-03-08 PROCEDURE — G8979 MOBILITY GOAL STATUS: HCPCS | Performed by: PHYSICAL THERAPIST

## 2018-03-08 PROCEDURE — 81001 URINALYSIS AUTO W/SCOPE: CPT | Performed by: INTERNAL MEDICINE

## 2018-03-08 PROCEDURE — G8978 MOBILITY CURRENT STATUS: HCPCS | Performed by: PHYSICAL THERAPIST

## 2018-03-08 PROCEDURE — 97116 GAIT TRAINING THERAPY: CPT | Performed by: PHYSICAL THERAPIST

## 2018-03-08 PROCEDURE — 85025 COMPLETE CBC W/AUTO DIFF WBC: CPT | Performed by: INTERNAL MEDICINE

## 2018-03-08 PROCEDURE — 99232 SBSQ HOSP IP/OBS MODERATE 35: CPT | Performed by: INTERNAL MEDICINE

## 2018-03-08 RX ADMIN — IPRATROPIUM BROMIDE 0.5 MG: 0.5 SOLUTION RESPIRATORY (INHALATION) at 20:58

## 2018-03-08 RX ADMIN — LEVALBUTEROL 1.25 MG: 1.25 SOLUTION, CONCENTRATE RESPIRATORY (INHALATION) at 08:33

## 2018-03-08 RX ADMIN — SODIUM CHLORIDE 100 ML/HR: 0.9 INJECTION, SOLUTION INTRAVENOUS at 02:53

## 2018-03-08 RX ADMIN — ENOXAPARIN SODIUM 40 MG: 40 INJECTION SUBCUTANEOUS at 09:25

## 2018-03-08 RX ADMIN — FOLIC ACID 1 MG: 1 TABLET ORAL at 09:24

## 2018-03-08 RX ADMIN — IPRATROPIUM BROMIDE 0.5 MG: 0.5 SOLUTION RESPIRATORY (INHALATION) at 08:33

## 2018-03-08 RX ADMIN — METHYLPREDNISOLONE SODIUM SUCCINATE 40 MG: 40 INJECTION, POWDER, FOR SOLUTION INTRAMUSCULAR; INTRAVENOUS at 20:27

## 2018-03-08 RX ADMIN — METHYLPREDNISOLONE SODIUM SUCCINATE 40 MG: 40 INJECTION, POWDER, FOR SOLUTION INTRAMUSCULAR; INTRAVENOUS at 09:25

## 2018-03-08 RX ADMIN — CYANOCOBALAMIN TAB 500 MCG 250 MCG: 500 TAB at 09:25

## 2018-03-08 RX ADMIN — SODIUM CHLORIDE 100 ML/HR: 0.9 INJECTION, SOLUTION INTRAVENOUS at 13:11

## 2018-03-08 RX ADMIN — LEVALBUTEROL 1.25 MG: 1.25 SOLUTION, CONCENTRATE RESPIRATORY (INHALATION) at 20:58

## 2018-03-08 RX ADMIN — NICOTINE 1 PATCH: 21 PATCH, EXTENDED RELEASE TRANSDERMAL at 09:32

## 2018-03-08 RX ADMIN — LEVOFLOXACIN 750 MG: 5 INJECTION, SOLUTION INTRAVENOUS at 20:27

## 2018-03-08 RX ADMIN — GUAIFENESIN 200 MG: 100 SOLUTION ORAL at 17:28

## 2018-03-08 NOTE — PHYSICAL THERAPY NOTE
PT Evaluation     03/08/18 0932   Note Type   Note type Eval/Treat   Pain Assessment   Pain Assessment No/denies pain   Pain Score No Pain   Home Living   Type of Home House   Home Layout Multi-level;Bed/bath upstairs  (no FLAQUITA, full flight with HR to 2nd floor)   Bathroom Shower/Tub Walk-in shower   Bathroom Toilet Standard   Bathroom Equipment Grab bars in shower; Shower chair;Grab bars around toilet   [address] Walker;Cane;Grab bars   Additional Comments pt sleeps on couch on first floor and has an aide that comes 3x/wk to assist with bathing/dressing   Prior Function   Level of [place] Independent with ADLs and functional mobility  ((I) ambulation with quad cane)   Lives With Friend(s)  (pt states he lives with 9 other people)   Receives Help From Friend(s); Home health;Personal care attendant   ADL Assistance Needs assistance  (pt states he is (I) with dressing on the days without aide)   IADLs Needs assistance   Comments friends drive   Restrictions/Precautions   Other Precautions Droplet precautions; Chair Alarm; Bed Alarm;Multiple lines;Telemetry;O2;Fall Risk   General   Family/Caregiver Present No   Cognition   Arousal/Participation Alert   Orientation Level Oriented X4   Following Commands Follows all commands and directions without difficulty   RLE Assessment   RLE Assessment WFL  (limited knee ext, 4/5)   LLE Assessment   LLE Assessment X  (L foot plantar flexed and inverted, knee ext limited 3+)   Coordination   Sensation WFL   Light Touch   RLE Light Touch Grossly intact   LLE Light Touch Grossly intact   Bed Mobility   Supine to Sit (seated in chair when PT entered)   Sit to Supine (returned to sitting in chair at bedside, alarm on)   Additional Comments Pt on 2L's O2 with SpO2 at rest % HR 88 and after ambulation SpO2 94%    Pt with good balance and stability during transfers and ambulation   Transfers   Sit to Stand 5  Supervision   Additional items Armrests   Stand to Sit 5  Supervision   Additional items Verbal cues   Stand pivot 5  Supervision   Additional items (pushing IV pole)   Additional Comments pt pushed IV pole during ambulation  Pt ambulates on L lateral border of foot and gait is with limp however no LOB  Pt with mild SOB but no drop in spO2 on 2L's below 94%  Ambulation/Elevation   Gait pattern Step to;Narrow ZAIN; Decreased L stance  (gait with limp, weightbears through lateral border foot )   Gait Assistance (CGA)   Assistive Device None  (pushing IV Pole)   Distance 160ft pushing IV pole CGA with limp and inversion L foot from history or stroke  Pt with no LOB however demonstrates mild SOB on 2L's O2  Balance   Static Sitting Good   Dynamic Sitting Fair +   Static Standing Fair   Dynamic Standing Fair   Ambulatory Fair   Endurance Deficit   Endurance Deficit Yes   Endurance Deficit Description Pt with limited ambulation tolerance due to fatigue and SOB   Activity Tolerance   Activity Tolerance Patient limited by fatigue   Assessment   Prognosis Good   Problem List Decreased strength;Decreased endurance;Decreased range of motion; Impaired balance;Decreased mobility   Assessment Pt is a 74year old male presenting with decreased L LE strength and ROM but demonstrates fair to good balance transfers and ambulation requiring CGA to (S) and able to push own IV pole  Pt with decreased endurance and limited ambulation tolerance due to fatigue and SOB  Pt is in need of continued activity in PT to improve strength balance endurance and functional mobility return to (I) LOF with mobility transfers ambulation and stair negotiation      Goals   Patient Goals To feel better and return home   LTG Expiration Date 03/22/18   Long Term Goal #1 (I) with all bed mobility and transfers with quad cane   Long Term Goal #2 Pt will ambulate 250ft with quad cane modified Independent and will ascend/descend 11 steps with HR Supervision   Plan Treatment/Interventions Functional transfer training;LE strengthening/ROM; Elevations; Therapeutic exercise; Endurance training;Patient/family training;Bed mobility;Gait training   PT Frequency (3-5x/wk)   Recommendation   Recommendation Home PT   PT - OK to Discharge No  (when medically stable for discharge)   Pt with SCD's on when PT entered room  SCD's reapplied and turned on with pt seated in chair at bedside with call bell in reach and chair alarm on

## 2018-03-08 NOTE — PROGRESS NOTES
Spoke with ST regarding diet change  Patient failed evaluation  Diet changed to dys level 3 with ground meats and nectar thick liquids  Changed supplements to honey shake TID with meals  No straws  Continue to monitor po intake

## 2018-03-08 NOTE — OCCUPATIONAL THERAPY NOTE
Occupational Therapy Evaluation     Patient Name: Vaishnavi Fields  AKUKWCAPO Date: 3/8/2018  Problem List  Patient Active Problem List   Diagnosis    COPD with acute exacerbation (HCC)    Acute tracheobronchitis    Tobacco abuse    Low serum prealbumin    BMI less than 19,adult    Severe protein-calorie malnutrition (HCC)    Hypoalbuminemia    Dysphagia    Microscopic hematuria    Renal cyst, right    Acute on chronic respiratory failure with hypoxia and hypercapnia (HCC)    Pulmonary nodule    Rhinovirus infection     Past Medical History  Past Medical History:   Diagnosis Date    Anemia     Asthma     [facility]ch[facility] disease[facility] ([facility] )     Coronary artery disease     Heart disease     Left wrist injury     Low testosterone     Stroke ([facility] )     Vitamin D deficiency      Past Surgical History  History reviewed  No pertinent surgical history  03/08/18 0848   Note Type   Note type Eval/Treat   Restrictions/Precautions   Weight Bearing Precautions Per Order No   Other Precautions Droplet precautions; Chair Alarm; Bed Alarm;Multiple lines;Telemetry;O2;Fall Risk   Pain Assessment   Pain Assessment No/denies pain   Home Living   Type of [facility] One level;Performs ADLs on one level; Able to live on main level with bedroom/bathroom; Elevator   Bathroom Shower/Tub Walk-in shower   Bathroom Toilet Standard   Bathroom Equipment Grab bars in shower; Shower chair;Grab bars around toilet   [address] Walker;Cane;Grab bars   Additional Comments pt states he utilizes the quad cane at baseline    Prior Function   Level of [place] Needs assistance with IADLs; Needs assistance with ADLs and functional mobility   Lives With Friend(s)  (pt states he lives with9 other people )   Receives Help From Friend(s)   ADL Assistance Needs assistance   IADLs Needs assistance   Comments pt's friends drive   Psychosocial   Psychosocial (WDL) WDL  (inappropriate comments at times)   Bed Mobility   Sit to Supine 4  Minimal assistance   Additional items Assist x 1;Bedrails;LE management   Additional Comments pt is no use of L UE and required min (A)   Transfers   Sit to Stand 4  Minimal assistance   Additional items Assist x 1;Verbal cues   Stand to Sit 4  Minimal assistance   Additional items Assist x 1;Verbal cues   Additional Comments pt with no device due to transferring from EOB to chair at a close distance with handheld (A) to the chair    Functional Mobility   Functional Mobility 4  Minimal assistance   Additional Comments pt performed ~1ft of FM to the chair from EOB   Balance   Static Sitting Good   Dynamic Sitting Fair +   Static Standing Fair   Dynamic Standing Fair -   Ambulatory Fair -   Activity Tolerance   Activity Tolerance Patient limited by fatigue   RUE Assessment   RUE Assessment X  (limited ROM grossly; thumb flexion noted; no digit movement)   LUE Assessment   LUE Assessment WFL   Hand Function   Gross Motor Coordination Impaired  (L UE)   Fine Motor Coordination Impaired  (L UE )   Sensation   Light Touch No apparent deficits   Sharp/Dull No apparent deficits   Cognition   Overall Cognitive Status WFL   Arousal/Participation Alert   Attention Within functional limits   Orientation Level Oriented X4   Memory Within functional limits   Following Commands Follows all commands and directions without difficulty   Comments pt with inappropriate comments at times    Assessment   Limitation Decreased ADL status; Decreased UE strength;Decreased endurance;Decreased self-care trans;Decreased high-level ADLs; Decreased Safe judgement during ADL;Decreased UE ROM; Decreased fine motor control   Assessment pt presesnts at evaluation performing at overall min (A) level with no device; pt with non-functioning L UE from previous stroke; pt will benefit from continued OT intervention as well as STR to improve self-care (I) as well as overall endurance required to complete tasks   Goals   Short Term Goal  pt will complete LB dressing at min (A) level    Long Term Goal #1 pt will perform UB/LB bathing and grooming tasks at min (A) level    Long Term Goal #2 pt will perform FM c quad cane at mod (I) level with no rest break demonstrating good safety awareness    Long Term Goal pt will perform toilet transfers and tasks at min (A) level   Plan   Treatment Interventions ADL retraining;Functional transfer training;UE strengthening/ROM; Endurance training;Patient/family training; Activityengagement   Goal Expiration Date 03/22/18   OT Frequency 3-5x/wk   Recommendation   OT Discharge Recommendation Short Term Rehab   OT - OK to Discharge No   Barthel Index   Feeding 5   Bathing 0   Grooming Score 0   Dressing Score 5   Bladder Score 10   Bowels Score 10   Toilet Use Score 5   Transfers (Bed/Chair) Score 10   Mobility (Level Surface) Score 10   Stairs Score 0   Barthel Index Score 55       Pt will benefit from continued OT services in order to maximize (I) c ADL performance, FM c quad cane, and improve overall endurance/strength required to complete functional tasks in preparation for d/c  Pt left seated in chair at end of session; all needs within reach; all lines intact

## 2018-03-08 NOTE — PROGRESS NOTES
Progress Note - Prashant De Leon 74 y o  male MRN: [id number]    Unit/Bed#: 427-01 Encounter: [id number]      Assessment:  Patient Active Problem List   Diagnosis    COPD with acute exacerbation (HCC)    Acute tracheobronchitis    Tobacco abuse    Low serum prealbumin    BMI less than 19,adult    Severe protein-calorie malnutrition (HCC)    Hypoalbuminemia    Dysphagia    Microscopic hematuria    Renal cyst, right    Acute on chronic respiratory failure with hypoxia and hypercapnia (HCC)    Pulmonary nodule    Rhinovirus infection         Plan:  1)  Acute on chronic respiratory failure with hypoxia and hypercapnia/COPD with acute exacerbation/Acute tracheobronchitis/Rhinovirus infection:  Continue IV Methylprednisolone  Continue IV Levofloxacin for a 5-day antibiotic course  Continue the respiratory protocol  Continue supplemental oxygen to maintain oxygen saturation levels of 90% and above  Maintain continuous pulse oximetry and the airway clearance protocol  PT/OT  If the patient has any change in his respiratory status or mental status, he will need an ABG checked  2)  Severe protein-calorie malnutrition/BMI less than 19/BMI=16 83/Hypoalbuminemia:  Continue the nutritional supplements per the Dietitian's recommendations  3)  Tobacco abuse:  Nicotine patch  Smoking cessation counseling  4)  Pulmonary nodule: He will need outpatient surveillance imaging  The patient continues to require inpatient hospitalization for IV antibiotics, IV Methylprednisolone, and for close monitoring of his respiratory status  Subjective:   Patient seen and examined  The patient continues to experience shortness of breath as well as a productive cough  Objective:     Vitals: Blood pressure 105/54, pulse 80, temperature 98 °F (36 7 °C), temperature source Temporal, resp  rate 18, height 5' 6" (1 676 m), weight 47 3 kg (104 lb 4 4 oz), SpO2 100 %  ,Body mass index is 16 83 kg/m²    Weight (last 2 days)     None          Intake/Output Summary (Last 24 hours) at 03/08/18 1050  Last data filed at 03/08/18 0848   Gross per 24 hour   Intake             1520 ml   Output              875 ml   Net              645 ml       Physical Exam: General:  NAD, awake, follows commands, + conversational dyspnea  HEENT:  NC/AT, mucous membranes dry  Neck:  Supple, No JVP elevation  CV:  + S1, + S2, RRR  Pulm:  Persistent expiratory wheezing bilaterally, Decreased breath sounds bilaterally  Abd:  Soft, Non-tender, Non-distended  Ext:  No clubbing/cyanosis/edema    Scheduled Meds:    Current Facility-Administered Medications:  cyanocobalamin 250 mcg Oral Daily Dali Blake MD    enoxaparin 40 mg Subcutaneous Daily Dali Blake MD    folic acid 1 mg Oral Daily Dali Blake MD    guaiFENesin 200 mg Oral Q6H PRN Kathi Corona DO    ipratropium 0 5 mg Nebulization BID Omari Body, MD    levalbuterol 1 25 mg Nebulization BID Omari Body, MD    levofloxacin 750 mg Intravenous Q24H Dali Blake MD Last Rate: 750 mg (03/07/18 2017)   methylPREDNISolone sodium succinate 40 mg Intravenous Q12H [address] Dali Blake MD    nicotine 1 patch Transdermal Daily Dali Blake MD    sodium chloride 100 mL/hr Intravenous Continuous Josias Caballero DO Last Rate: 100 mL/hr (03/08/18 0253)     Continuous Infusions:    sodium chloride 100 mL/hr Last Rate: 100 mL/hr (03/08/18 0253)     PRN Meds: guaiFENesin      Invasive Devices     Peripheral Intravenous Line            Peripheral IV 03/05/18 Right Wrist 2 days                  Results from last 7 days  Lab Units 03/08/18  0605 03/07/18  0445 03/06/18  0624  03/05/18  1521   WBC Thousand/uL 9 33 12 71* 8 83  --  12 20*   HEMOGLOBIN g/dL 13 1 14 3 13 2  --  14 9   HEMATOCRIT % 40 7 44 0 40 6  --  46 1   PLATELETS Thousands/uL 256 284 236  < > 259   NEUTROS PCT % 85*  --   --   --  76*   MONOS PCT % 6  --   --   --  11   MONO PCT MAN %  --  2*  --   --   --    < > = values in this interval not displayed  Results from last 7 days  Lab Units 03/08/18  0605 03/07/18  0445 03/06/18  0624 03/05/18  1521   SODIUM mmol/L 138 141 138 138   POTASSIUM mmol/L 4 9 4 7 4 4 3 9   CHLORIDE mmol/L 103 102 103 101   CO2 mmol/L 29 31 27 34*   BUN mg/dL 18 17 19 12   CREATININE mg/dL 0 73 0 78 0 81 0 69   CALCIUM mg/dL 8 5 9 3 8 9 8 9   TOTAL PROTEIN g/dL 6 0* 7 1  --  7 7   BILIRUBIN TOTAL mg/dL 0 30 0 30  --  0 90   ALK PHOS U/L 53 70  --  79   ALT U/L 12 16  --  21   AST U/L 12 14  --  22   GLUCOSE RANDOM mg/dL 119 124 126 102       Lab Results   Component Value Date    CKTOTAL 20 (L) 03/08/2018    TROPONINI <0 02 03/06/2018       Lab Results   Component Value Date    INR 1 16 03/05/2018    INR 1 22 (H) 11/05/2017    INR 1 14 10/04/2016    PROTIME 14 7 (H) 03/05/2018    PROTIME 15 3 (H) 11/05/2017    PROTIME 14 2 10/04/2016                 Lab, Imaging and other studies: I have personally reviewed pertinent reports  VTE Pharmacologic Prophylaxis: Enoxaparin (Lovenox)  VTE Mechanical Prophylaxis: sequential compression device-The patient has been refusing the SCD's

## 2018-03-08 NOTE — SPEECH THERAPY NOTE
Speech Language/Pathology  Speech/Language Pathology  Assessment    Patient Name: Prashant De Leon  OKQWB'P Date: 3/8/2018     Problem List  Patient Active Problem List   Diagnosis    [facility] with acute exacerbation ([facility] )    Acute tracheobronchitis    Tobacco abuse    Low serum prealbumin    BMI less than 19,adult    Severe protein-calorie malnutrition (HCC)    Hypoalbuminemia    Dysphagia    Microscopic hematuria    Renal cyst, right    Acute on chronic respiratory failure with hypoxia and hypercapnia (HCC)    Pulmonary nodule    Rhinovirus infection     Past Medical History  Past Medical History:   Diagnosis Date    Anemia     Asthma     COPD (chronic obstructive pulmonary disease) ([facility] )     Coronary artery disease     Heart disease     Left wrist injury     Low testosterone     Stroke ([place] [id number] )     Vitamin D deficiency      Past Surgical History  History reviewed  No pertinent surgical history  03/08/18 1540   Swallow Information   Current Risks for Dysphagia & Aspiration Respiratory compromise;HX neurologic dx   Current Symptoms/Concerns HX Dysphagia;Cough; With liquids   Current Diet Regular; Thin liquid   Baseline Diet Dyphagia advanced; Nectar thick liquids   Baseline Assessment   Behavior/Cognition Alert; Cooperative; Interactive; Impulsive   Speech/Language Status Verbal; follows directions, but requires occasional cueing   Patient Positioning Upright in bed   Swallow Mechanism Exam   Labial Symmetry Abnormal symmetry left   Labial Strength WFL   Labial ROM Reduced left   Labial Sensation WFL   Facial Symmetry Left droop   Facial Strength WFL   Facial ROM Reduced left;Mild   Facial Sensation Reduced   Lingual Symmetry WFL   Lingual Strength Mild reduced   Lingual ROM Mild reduced   Lingual Sensation Reduced   Velum WFL   Gag WFL   Mandible Impaired   Dentition Edentulous   Volitional Cough Strong   Consistencies Assessed and Performance   Materials Admnistered Nectar thick liquid; Thin liquid;Regular/Solid;Soft/Level 3;Mechanical Soft/Level 2   Oral Stage Moderate impaired   Oral Stage Comment Patient has reduced bolus formation and control with hard solid  REduced mastication with hard solid due to dentitiion  Patient has reduced oral control with thin liquids  WFL with nectar thick via cup only  Patient is impulsive with straw sips and takes large bolus with reduced control and risk for premature leakage  WFL orally with nectar liquids and soft foods, with ground meats due to lack of dentitiion  Phargngeal Stage Moderate impaired;Aspiration risk   Pharyngeal Stage Comment Patient presents with mild delay in swallow initiation  Overt s/s with thin liquids cup or straw  No s/s with nectar thick via cup  Throat clear with straw with nectar thick  No s/s with solid trials  Swallow Mechanics Mild delayed;Swallow initation;Good Larygneal rise;Aspiration risk   Esophageal Concerns No s/s reported   Summary   Swallow Summary Patient is known to SLP; as seen here in Nov 2017  Patient has h/o cva and dysphagia since then  He is with mild/moderate oral dysphagia and mild/moderate pharyngeal dysphagia  Patient has delay in swallow initiation and has been on nectar thick liquids ongoing  He has been educated and provided with thickener in the past  Patient is edentulous and is [facility] with soft foods and ground meats  Patient states he drinks regular liquids at home, even though he coughs  Discussed risks again with patient  Recommendations   Risk for Aspiration Moderate   Recommendations Consider oral diet; Dysphagia treatment   Diet Solid Recommendation Level 3 Dysphagia/ advanced/ soft to chew  (ground meats)   Diet Liquid Recommendation Nectar thick liquid  (No straws)   Recommended Form of Meds As desired; As tolerated   General Precautions Aspiration precautions; Feed only when alert;Upright as possible for all oral intake;Supervision with meals   Compensatory Swallowing Strategies No straws; Alternate solids and liquids   Further Evaluations Dietician   Results Reviewed with RN;PT/Family/Caregiver   Treatment Recommendations   Duration of treatment one week   Follow up treatments Assure diet tolerance; Patient/family education   Dysphagia Goals Patient will tolerate recommended diet without observed clinical signs of oral/pharngeal dysphagia   Speech Therapy Prognosis   Prognosis Guarded   Prognosis Considerations Patient Participation Level

## 2018-03-09 ENCOUNTER — APPOINTMENT (INPATIENT)
Dept: CT IMAGING | Facility: HOSPITAL | Age: 73
DRG: 871 | End: 2018-03-09
Payer: MEDICARE

## 2018-03-09 PROBLEM — R10.9 INTRACTABLE ABDOMINAL PAIN: Status: ACTIVE | Noted: 2018-03-09

## 2018-03-09 LAB
ANION GAP SERPL CALCULATED.3IONS-SCNC: 3 MMOL/L (ref 4–13)
BACTERIA SPT RESP CULT: NORMAL
BASOPHILS # BLD AUTO: 0.01 THOUSANDS/ΜL (ref 0–0.1)
BASOPHILS NFR BLD AUTO: 0 % (ref 0–1)
BUN SERPL-MCNC: 20 MG/DL (ref 5–25)
CALCIUM SERPL-MCNC: 8.5 MG/DL (ref 8.3–10.1)
CHLORIDE SERPL-SCNC: 104 MMOL/L (ref 100–108)
CO2 SERPL-SCNC: 32 MMOL/L (ref 21–32)
CREAT SERPL-MCNC: 0.76 MG/DL (ref 0.6–1.3)
EOSINOPHIL # BLD AUTO: 0 THOUSAND/ΜL (ref 0–0.61)
EOSINOPHIL NFR BLD AUTO: 0 % (ref 0–6)
ERYTHROCYTE [DISTWIDTH] IN BLOOD BY AUTOMATED COUNT: 12.8 % (ref 11.6–15.1)
GFR SERPL CREATININE-BSD FRML MDRD: 90 ML/MIN/1.73SQ M
GLUCOSE SERPL-MCNC: 129 MG/DL (ref 65–140)
GRAM STN SPEC: NORMAL
HCT VFR BLD AUTO: 43.1 % (ref 36.5–49.3)
HGB BLD-MCNC: 14.1 G/DL (ref 12–17)
LACTATE SERPL-SCNC: 1.8 MMOL/L (ref 0.5–2)
LYMPHOCYTES # BLD AUTO: 0.67 THOUSANDS/ΜL (ref 0.6–4.47)
LYMPHOCYTES NFR BLD AUTO: 7 % (ref 14–44)
MAGNESIUM SERPL-MCNC: 1.8 MG/DL (ref 1.6–2.6)
MCH RBC QN AUTO: 30.9 PG (ref 26.8–34.3)
MCHC RBC AUTO-ENTMCNC: 32.7 G/DL (ref 31.4–37.4)
MCV RBC AUTO: 94 FL (ref 82–98)
MONOCYTES # BLD AUTO: 0.5 THOUSAND/ΜL (ref 0.17–1.22)
MONOCYTES NFR BLD AUTO: 5 % (ref 4–12)
NEUTROPHILS # BLD AUTO: 8.99 THOUSANDS/ΜL (ref 1.85–7.62)
NEUTS SEG NFR BLD AUTO: 88 % (ref 43–75)
PLATELET # BLD AUTO: 278 THOUSANDS/UL (ref 149–390)
PMV BLD AUTO: 10 FL (ref 8.9–12.7)
POTASSIUM SERPL-SCNC: 4 MMOL/L (ref 3.5–5.3)
RBC # BLD AUTO: 4.57 MILLION/UL (ref 3.88–5.62)
SODIUM SERPL-SCNC: 139 MMOL/L (ref 136–145)
WBC # BLD AUTO: 10.17 THOUSAND/UL (ref 4.31–10.16)

## 2018-03-09 PROCEDURE — G8997 SWALLOW GOAL STATUS: HCPCS | Performed by: SPEECH-LANGUAGE PATHOLOGIST

## 2018-03-09 PROCEDURE — 99232 SBSQ HOSP IP/OBS MODERATE 35: CPT | Performed by: INTERNAL MEDICINE

## 2018-03-09 PROCEDURE — 94640 AIRWAY INHALATION TREATMENT: CPT

## 2018-03-09 PROCEDURE — 97110 THERAPEUTIC EXERCISES: CPT

## 2018-03-09 PROCEDURE — 80048 BASIC METABOLIC PNL TOTAL CA: CPT | Performed by: INTERNAL MEDICINE

## 2018-03-09 PROCEDURE — G8996 SWALLOW CURRENT STATUS: HCPCS | Performed by: SPEECH-LANGUAGE PATHOLOGIST

## 2018-03-09 PROCEDURE — 92526 ORAL FUNCTION THERAPY: CPT | Performed by: SPEECH-LANGUAGE PATHOLOGIST

## 2018-03-09 PROCEDURE — 97530 THERAPEUTIC ACTIVITIES: CPT

## 2018-03-09 PROCEDURE — 94760 N-INVAS EAR/PLS OXIMETRY 1: CPT

## 2018-03-09 PROCEDURE — 94762 N-INVAS EAR/PLS OXIMTRY CONT: CPT

## 2018-03-09 PROCEDURE — 74177 CT ABD & PELVIS W/CONTRAST: CPT

## 2018-03-09 PROCEDURE — G8998 SWALLOW D/C STATUS: HCPCS | Performed by: SPEECH-LANGUAGE PATHOLOGIST

## 2018-03-09 PROCEDURE — 85025 COMPLETE CBC W/AUTO DIFF WBC: CPT | Performed by: INTERNAL MEDICINE

## 2018-03-09 PROCEDURE — 83735 ASSAY OF MAGNESIUM: CPT | Performed by: INTERNAL MEDICINE

## 2018-03-09 PROCEDURE — 83605 ASSAY OF LACTIC ACID: CPT | Performed by: INTERNAL MEDICINE

## 2018-03-09 RX ORDER — MAGNESIUM HYDROXIDE/ALUMINUM HYDROXICE/SIMETHICONE 120; 1200; 1200 MG/30ML; MG/30ML; MG/30ML
5 SUSPENSION ORAL ONCE
Status: COMPLETED | OUTPATIENT
Start: 2018-03-09 | End: 2018-03-10

## 2018-03-09 RX ORDER — ERGOCALCIFEROL 1.25 MG/1
50000 CAPSULE ORAL WEEKLY
Status: DISCONTINUED | OUTPATIENT
Start: 2018-03-10 | End: 2018-03-10 | Stop reason: HOSPADM

## 2018-03-09 RX ORDER — ACETAMINOPHEN 325 MG/1
650 TABLET ORAL EVERY 6 HOURS PRN
Status: DISCONTINUED | OUTPATIENT
Start: 2018-03-09 | End: 2018-03-10 | Stop reason: HOSPADM

## 2018-03-09 RX ORDER — OXYCODONE HYDROCHLORIDE 5 MG/1
5 TABLET ORAL EVERY 4 HOURS PRN
Status: DISCONTINUED | OUTPATIENT
Start: 2018-03-09 | End: 2018-03-10 | Stop reason: HOSPADM

## 2018-03-09 RX ORDER — ONDANSETRON 2 MG/ML
4 INJECTION INTRAMUSCULAR; INTRAVENOUS EVERY 4 HOURS PRN
Status: DISCONTINUED | OUTPATIENT
Start: 2018-03-09 | End: 2018-03-10 | Stop reason: HOSPADM

## 2018-03-09 RX ADMIN — LEVOFLOXACIN 750 MG: 5 INJECTION, SOLUTION INTRAVENOUS at 20:21

## 2018-03-09 RX ADMIN — NICOTINE 1 PATCH: 21 PATCH, EXTENDED RELEASE TRANSDERMAL at 10:11

## 2018-03-09 RX ADMIN — LEVALBUTEROL 1.25 MG: 1.25 SOLUTION, CONCENTRATE RESPIRATORY (INHALATION) at 20:46

## 2018-03-09 RX ADMIN — LEVALBUTEROL 1.25 MG: 1.25 SOLUTION, CONCENTRATE RESPIRATORY (INHALATION) at 08:51

## 2018-03-09 RX ADMIN — METHYLPREDNISOLONE SODIUM SUCCINATE 40 MG: 40 INJECTION, POWDER, FOR SOLUTION INTRAMUSCULAR; INTRAVENOUS at 09:25

## 2018-03-09 RX ADMIN — CYANOCOBALAMIN TAB 500 MCG 250 MCG: 500 TAB at 09:24

## 2018-03-09 RX ADMIN — IPRATROPIUM BROMIDE 0.5 MG: 0.5 SOLUTION RESPIRATORY (INHALATION) at 20:46

## 2018-03-09 RX ADMIN — GUAIFENESIN 200 MG: 100 SOLUTION ORAL at 20:21

## 2018-03-09 RX ADMIN — FOLIC ACID 1 MG: 1 TABLET ORAL at 09:25

## 2018-03-09 RX ADMIN — IPRATROPIUM BROMIDE 0.5 MG: 0.5 SOLUTION RESPIRATORY (INHALATION) at 08:51

## 2018-03-09 RX ADMIN — ENOXAPARIN SODIUM 40 MG: 40 INJECTION SUBCUTANEOUS at 09:25

## 2018-03-09 RX ADMIN — IOHEXOL 100 ML: 350 INJECTION, SOLUTION INTRAVENOUS at 18:09

## 2018-03-09 RX ADMIN — ACETAMINOPHEN 650 MG: 325 TABLET, FILM COATED ORAL at 04:05

## 2018-03-09 RX ADMIN — SODIUM CHLORIDE 50 ML/HR: 0.9 INJECTION, SOLUTION INTRAVENOUS at 12:10

## 2018-03-09 RX ADMIN — METHYLPREDNISOLONE SODIUM SUCCINATE 40 MG: 40 INJECTION, POWDER, FOR SOLUTION INTRAMUSCULAR; INTRAVENOUS at 20:21

## 2018-03-09 NOTE — CASE MANAGEMENT
Continued Stay Review    Date: 3/9/2018    Vital Signs: /54 (BP Location: Left arm)   Pulse 90   Temp 98 2 °F (36 8 °C) (Temporal)   Resp 18   Ht 5' 6" (1 676 m)   Wt 47 3 kg (104 lb 4 4 oz)   SpO2 97%   BMI 16 83 kg/m²     Medications:   Scheduled Meds:   Current Facility-Administered Medications:  acetaminophen 650 mg Oral Q6H PRN Apolinar Points, DO    cyanocobalamin 250 mcg Oral Daily Dali Blake MD    enoxaparin 40 mg Subcutaneous Daily Marissa Marin MD    [START ON 3/10/2018] ergocalciferol 50,000 Units Oral Weekly Apolinar Points, DO    folic acid 1 mg Oral Daily Dali Blake MD    guaiFENesin 200 mg Oral Q6H PRN Katheen Pippins, DO    ipratropium 0 5 mg Nebulization BID Violetta Hastings MD    levalbuterol 1 25 mg Nebulization BID Violetta Hastings MD    levofloxacin 750 mg Intravenous Q24H Dali Blake MD Last Rate: 750 mg (03/08/18 2027)   methylPREDNISolone sodium succinate 40 mg Intravenous Q12H [address] Dali Blake MD    nicotine 1 patch Transdermal Daily Dali Blake MD    ondansetron 4 mg Intravenous Q4H PRN Apolinar Points, DO    oxyCODONE 5 mg Oral Q4H PRN Apolinar Points, DO    sodium chloride 50 mL/hr Intravenous Continuous Apolinar Points, DO Last Rate: 50 mL/hr (03/09/18 1210)     Continuous Infusions:   sodium chloride 50 mL/hr Last Rate: 50 mL/hr (03/09/18 1210)     PRN Meds:   acetaminophen    guaiFENesin    ondansetron    oxyCODONE    Abnormal Labs/Diagnostic Results: gap 3  Wbc 10 17    Age/Sex: 74 y o  male     Assessment/Plan: Assessment:      Patient Active Problem List   Diagnosis    Vitamin D deficiency    COPD with acute exacerbation ([facility] )    Viral sepsis ([facility] )    Acute tracheobronchitis    Tobacco abuse    Low serum prealbumin    BMI less than 19,adult    Severe protein-calorie malnutrition (HCC)    Hypoalbuminemia    Dysphagia    Microscopic hematuria    Renal cyst, right    Acute on chronic respiratory failure with hypoxia and hypercapnia ([facility] )    Pulmonary nodule    Rhinovirus infection    Intractable abdominal pain            Plan:  1)  Acute on chronic respiratory failure with hypoxia and hypercapnia/COPD with acute exacerbation/Acute tracheobronchitis/Viral sepsis (present on admission) secondary to Rhinovirus infection:  Continue IV Methylprednisolone  Continue IV Levofloxacin for a 5-day antibiotic course  Continue the respiratory protocol  Continue supplemental oxygen to maintain oxygen saturation levels of 90% and above  Maintain continuous pulse oximetry and the airway clearance protocol  PT/OT  If the patient has any change in his respiratory status or mental status, he will need an ABG checked     2)  Intractable abdominal pain:  Check a CT scan of the abdomen/pelvis with PO and IV contrast      3)  Severe protein-calorie malnutrition/BMI less than 19/BMI=16 83/Hypoalbuminemia:  Continue the nutritional supplements per the Dietitian's recommendations     4)  Tobacco abuse:  Nicotine patch  Smoking cessation counseling      5)  Pulmonary nodule: He will need outpatient surveillance imaging     6)  Vitamin D deficiency: For the vitamin D deficiency, the patient was placed on Ergocalciferol 50,000 I  U  PO Qweekly for 8 weekly doses  He will then need a repeat Vitamin D 25-OH level checked in 2 months and will need a daily Cholecalciferol supplement prescribed by her PCP based on the repeat Vitamin D 25-OH level results      The patient continues to require inpatient hospitalization for IV antibiotics, IV Methylprednisolone, and for close monitoring of his respiratory status  In addition, he requires hospitalization for work-up of his abdominal pain      Subjective:   Patient seen and examined    The patient has been experiencing severe abdominal pain overnight and this AM   His shortness of breath is improving          Discharge Plan: cm following -- at this time patient is going home when d/c

## 2018-03-09 NOTE — OCCUPATIONAL THERAPY NOTE
OT Note     03/09/18 1021   Restrictions/Precautions   Other Precautions Droplet precautions;O2;Fall Risk;Bed Alarm; Chair Alarm   ADL   Toileting Assistance  4  Minimal Assistance   Toileting Comments Utilizes urinal in stance   Bed Mobility   Supine to Sit 5  Supervision   Additional items Bedrails; Increased time required   Transfers   Sit to Stand 4  Minimal assistance   Additional items Assist x 1;Bedrails   Stand to Sit 5  Supervision   Additional items Armrests   Functional Mobility   Functional Mobility 4  Minimal assistance   Additional Comments Completes txfr EOB to recliner at bedside   Additional items (Utilizes armrests for support thru txfr)   Activity Tolerance   Activity Tolerance Patient tolerated treatment well   Assessment   Assessment Presents supine, agreeable to OOB to recliner  Txfred EOB, completes sit to stand, utilizes urinal in stance  Txfrs recliner as per above  Chair alarm activated  Call bell and phone in reach     Recommendation   Recommendation (Continue OT services )

## 2018-03-09 NOTE — PROGRESS NOTES
Progress Note - Edgar Gage 74 y o  male MRN: [id number]    Unit/Bed#: 427-01 Encounter: [id number]      Assessment:  Patient Active Problem List   Diagnosis    Vitamin D deficiency    [facility]    [facility]al sepsis [facility]    Acute tracheobronchitis    Tobacco abuse    Low serum prealbumin    BMI less than 19,adult    Severe protein-calorie malnutrition (HCC)    Hypoalbuminemia    Dysphagia    Microscopic hematuria    Renal cyst, right    Acute on chronic respiratory failure with hypoxia and hypercapnia (HCC)    Pulmonary nodule    Rhinovirus infection    Intractable abdominal pain         Plan:  1)  Acute on chronic respiratory failure with hypoxia and hypercapnia/COPD with acute exacerbation/Acute tracheobronchitis/Viral sepsis (present on admission) secondary to Rhinovirus infection:  Continue IV Methylprednisolone  Continue IV Levofloxacin for a 5-day antibiotic course  Continue the respiratory protocol  Continue supplemental oxygen to maintain oxygen saturation levels of 90% and above  Maintain continuous pulse oximetry and the airway clearance protocol  PT/OT  If the patient has any change in his respiratory status or mental status, he will need an ABG checked  2)  Intractable abdominal pain:  Check a CT scan of the abdomen/pelvis with PO and IV contrast     3)  Severe protein-calorie malnutrition/BMI less than 19/BMI=16 83/Hypoalbuminemia:  Continue the nutritional supplements per the Dietitian's recommendations  4)  Tobacco abuse:  Nicotine patch  Smoking cessation counseling  5)  Pulmonary nodule: He will need outpatient surveillance imaging  6)  Vitamin D deficiency: For the vitamin D deficiency, the patient was placed on Ergocalciferol 50,000 I  U  PO Qweekly for 8 weekly doses    He will then need a repeat Vitamin D 25-OH level checked in 2 months and will need a daily Cholecalciferol supplement prescribed by her PCP based on the repeat Vitamin D 25-OH level results  The patient continues to require inpatient hospitalization for IV antibiotics, IV Methylprednisolone, and for close monitoring of his respiratory status  In addition, he requires hospitalization for work-up of his abdominal pain  Subjective:   Patient seen and examined  The patient has been experiencing severe abdominal pain overnight and this AM   His shortness of breath is improving  Objective:     Vitals: Blood pressure 108/54, pulse 90, temperature 98 2 °F (36 8 °C), temperature source Temporal, resp  rate 18, height 5' 6" (1 676 m), weight 47 3 kg (104 lb 4 4 oz), SpO2 97 %  ,Body mass index is 16 83 kg/m²    Weight (last 2 days)     None          Intake/Output Summary (Last 24 hours) at 03/09/18 1138  Last data filed at 03/09/18 1001   Gross per 24 hour   Intake              480 ml   Output              810 ml   Net             -330 ml       Physical Exam: General:  NAD, awake, follows commands, + persistent conversational dyspnea  HEENT:  NC/AT, mucous membranes dry  Neck:  Supple, No JVP elevation  CV:  + S1, + S2, RRR  Pulm:  Persistent expiratory wheezing bilaterally, Decreased breath sounds bilaterally  Abd:  Soft, + Mid-abdominal pain with palpation, Mild distension  Ext:  No clubbing/cyanosis/edema    Scheduled Meds:    Current Facility-Administered Medications:  acetaminophen 650 mg Oral Q6H PRN Blake Richardson PA-C    cyanocobalamin 250 mcg Oral Daily Dali Blake MD    enoxaparin 40 mg Subcutaneous Daily Dali Blake MD    [START ON 3/10/2018] ergocalciferol 50,000 Units Oral Weekly Sophy Hyde,     folic acid 1 mg Oral Daily Dali Blake MD    guaiFENesin 200 mg Oral Q6H PRN Blease Harish, DO    ipratropium 0 5 mg Nebulization BID Broderick Hansen MD    levalbuterol 1 25 mg Nebulization BID Broderick Hansen MD    levofloxacin 750 mg Intravenous Q24H Dali Blake MD Last Rate: 750 mg (03/08/18 2027)   methylPREDNISolone sodium succinate 40 mg Intravenous Q12H [address] Dali Isa Santiago MD    nicotine 1 patch Transdermal Daily Dali Blake MD    ondansetron 4 mg Intravenous Q4H PRN Maxim Pace, DO    oxyCODONE 5 mg Oral Q4H PRN Maxim Pace, DO    sodium chloride 100 mL/hr Intravenous Continuous Bettyearlene Harrisonner, DO Last Rate: 100 mL/hr (03/08/18 1311)     Continuous Infusions:    sodium chloride 100 mL/hr Last Rate: 100 mL/hr (03/08/18 1311)     PRN Meds:   acetaminophen    guaiFENesin    ondansetron    oxyCODONE      Invasive Devices     Peripheral Intravenous Line            Peripheral IV 03/05/18 Right Wrist 3 days                  Results from last 7 days  Lab Units 03/09/18 0429 03/08/18 0605 03/07/18 0445 03/05/18  1521   WBC Thousand/uL 10 17* 9 33 12 71*  < > 12 20*   HEMOGLOBIN g/dL 14 1 13 1 14 3  < > 14 9   HEMATOCRIT % 43 1 40 7 44 0  < > 46 1   PLATELETS Thousands/uL 278 256 284  < > 259   NEUTROS PCT % 88* 85*  --   --  76*   MONOS PCT % 5 6  --   --  11   MONO PCT MAN %  --   --  2*  --   --    < > = values in this interval not displayed  Results from last 7 days  Lab Units 03/09/18 0429 03/08/18 0605 03/07/18 0445 03/05/18  1521   SODIUM mmol/L 139 138 141  < > 138   POTASSIUM mmol/L 4 0 4 9 4 7  < > 3 9   CHLORIDE mmol/L 104 103 102  < > 101   CO2 mmol/L 32 29 31  < > 34*   BUN mg/dL 20 18 17  < > 12   CREATININE mg/dL 0 76 0 73 0 78  < > 0 69   CALCIUM mg/dL 8 5 8 5 9 3  < > 8 9   TOTAL PROTEIN g/dL  --  6 0* 7 1  --  7 7   BILIRUBIN TOTAL mg/dL  --  0 30 0 30  --  0 90   ALK PHOS U/L  --  53 70  --  79   ALT U/L  --  12 16  --  21   AST U/L  --  12 14  --  22   GLUCOSE RANDOM mg/dL 129 119 124  < > 102   < > = values in this interval not displayed      Lab Results   Component Value Date    CKTOTAL 20 (L) 03/08/2018    TROPONINI <0 02 03/06/2018       Lab Results   Component Value Date    INR 1 16 03/05/2018    INR 1 22 (H) 11/05/2017    INR 1 14 10/04/2016    PROTIME 14 7 (H) 03/05/2018    PROTIME 15 3 (H) 11/05/2017    PROTIME 14 2 10/04/2016 Lab, Imaging and other studies: I have personally reviewed pertinent reports  VTE Pharmacologic Prophylaxis: Enoxaparin (Lovenox)  VTE Mechanical Prophylaxis: sequential compression device-The patient has been refusing the SCD's

## 2018-03-09 NOTE — PHYSICAL THERAPY NOTE
PT Treatment Note     03/09/18 1329   Pain Assessment   Pain Assessment No/denies pain   Cognition   Arousal/Participation Responsive; Cooperative   Attention Attends with cues to redirect   Following Commands Follows one step commands without difficulty   Subjective   Subjective Pt agreeable to therapy on this date  Bed Mobility   Sit to Supine 4  Minimal assistance   Additional items Assist x 1;Bedrails   Transfers   Sit to Stand 4  Minimal assistance   Additional items Assist x 1   Stand to Sit 5  Supervision   Additional items Bedrails   Activity Tolerance   Activity Tolerance Patient limited by fatigue   Exercises   Hip Flexion Sitting;15 reps;AROM; Bilateral   Knee AROM Short Arc Quad Sitting;15 reps;AROM; Bilateral   Ankle Pumps Sitting;15 reps;AROM; Bilateral   Assessment   Prognosis Good   Problem List Decreased strength;Decreased endurance   Assessment Pt would continue to benefit from continued PT services to further improve BLE strength and improve endurance to maximize return to PLOF  Plan   Treatment/Interventions Functional transfer training;LE strengthening/ROM; Therapeutic exercise   Progress Progressing toward goals   Pt positioned in bed for comfort and call bell w/in reach at conclusion of therapy session

## 2018-03-09 NOTE — PLAN OF CARE
Problem: SLP ADULT - SWALLOWING, IMPAIRED  Goal: Advance to least restrictive diet without signs or symptoms of aspiration for planned discharge setting  See evaluation for individualized goals  Patient will:    1  Tolerate level 3 diet and ground meats, nectar thick liquids with no S/S of   oral/pharyngeal dysphagia  across meals           Outcome: Completed Date Met: 03/09/18

## 2018-03-09 NOTE — SPEECH THERAPY NOTE
Speech Language/Pathology    Speech/Language Pathology Progress Note    Patient Name: Edgar Gage  BYKVC'D Date: 3/9/2018     Problem List  Patient Active Problem List   Diagnosis    Vitamin D deficiency    [facility] )    Viral sepsis ([facility] )    Acute tracheobronchitis    Tobacco abuse    Low serum prealbumin    BMI less than 19,adult    Severe protein-calorie malnutrition (HCC)    Hypoalbuminemia    Dysphagia    Microscopic hematuria    Renal cyst, right    Acute on chronic respiratory failure with hypoxia and hypercapnia (HCC)    Pulmonary nodule    Rhinovirus infection    Intractable abdominal pain        Past Medical History  Past Medical History:   Diagnosis Date    Anemia     Asthma     [facility]chronic obstructive pulmonary disease) ([facility] )     Coronary artery disease     Heart disease     Left wrist injury     Low testosterone     Stroke ([place] [id number] )     Vitamin D deficiency         Past Surgical History  History reviewed  No pertinent surgical history  Subjective:  Patient is alert and oriented and cooperative  Objective:  Patient is tolerating current diet level without s/s  Assessment:  Patient asking for straw; and he was again educated that he is safer without the straw  He agreed and trials of nectar thick were without s/s  Patient trialed soft food and no overt oral or pharyngeal symptoms  WFL for adequate intake safely  Plan/Recommendations:  Patient without s/s at baseline diet recommendation  This service is no longer required  Continue soft diet, ground meats, and nectar thick liquids  No straws

## 2018-03-10 VITALS
RESPIRATION RATE: 18 BRPM | OXYGEN SATURATION: 96 % | DIASTOLIC BLOOD PRESSURE: 62 MMHG | SYSTOLIC BLOOD PRESSURE: 124 MMHG | TEMPERATURE: 98.9 F | BODY MASS INDEX: 16.76 KG/M2 | WEIGHT: 104.28 LBS | HEART RATE: 85 BPM | HEIGHT: 66 IN

## 2018-03-10 LAB
ALBUMIN SERPL BCP-MCNC: 2.2 G/DL (ref 3.5–5)
ALP SERPL-CCNC: 54 U/L (ref 46–116)
ALT SERPL W P-5'-P-CCNC: 25 U/L (ref 12–78)
ANION GAP SERPL CALCULATED.3IONS-SCNC: 3 MMOL/L (ref 4–13)
ANISOCYTOSIS BLD QL SMEAR: PRESENT
AST SERPL W P-5'-P-CCNC: 13 U/L (ref 5–45)
BACTERIA BLD CULT: NORMAL
BACTERIA BLD CULT: NORMAL
BACTERIA UR CULT: NORMAL
BASOPHILS # BLD MANUAL: 0 THOUSAND/UL (ref 0–0.1)
BASOPHILS NFR MAR MANUAL: 0 % (ref 0–1)
BILIRUB SERPL-MCNC: 0.2 MG/DL (ref 0.2–1)
BUN SERPL-MCNC: 21 MG/DL (ref 5–25)
CALCIUM SERPL-MCNC: 8.2 MG/DL (ref 8.3–10.1)
CHLORIDE SERPL-SCNC: 104 MMOL/L (ref 100–108)
CO2 SERPL-SCNC: 33 MMOL/L (ref 21–32)
CREAT SERPL-MCNC: 0.66 MG/DL (ref 0.6–1.3)
EOSINOPHIL # BLD MANUAL: 0 THOUSAND/UL (ref 0–0.4)
EOSINOPHIL NFR BLD MANUAL: 0 % (ref 0–6)
ERYTHROCYTE [DISTWIDTH] IN BLOOD BY AUTOMATED COUNT: 12.8 % (ref 11.6–15.1)
GFR SERPL CREATININE-BSD FRML MDRD: 95 ML/MIN/1.73SQ M
GLUCOSE SERPL-MCNC: 130 MG/DL (ref 65–140)
HCT VFR BLD AUTO: 39.3 % (ref 36.5–49.3)
HGB BLD-MCNC: 13.1 G/DL (ref 12–17)
LACTATE SERPL-SCNC: 1.7 MMOL/L (ref 0.5–2)
LG PLATELETS BLD QL SMEAR: PRESENT
LYMPHOCYTES # BLD AUTO: 0.67 THOUSAND/UL (ref 0.6–4.47)
LYMPHOCYTES # BLD AUTO: 6 % (ref 14–44)
MACROCYTES BLD QL AUTO: PRESENT
MAGNESIUM SERPL-MCNC: 1.8 MG/DL (ref 1.6–2.6)
MCH RBC QN AUTO: 31.3 PG (ref 26.8–34.3)
MCHC RBC AUTO-ENTMCNC: 33.3 G/DL (ref 31.4–37.4)
MCV RBC AUTO: 94 FL (ref 82–98)
MONOCYTES # BLD AUTO: 0.34 THOUSAND/UL (ref 0–1.22)
MONOCYTES NFR BLD: 3 % (ref 4–12)
NEUTROPHILS # BLD MANUAL: 10.2 THOUSAND/UL (ref 1.85–7.62)
NEUTS SEG NFR BLD AUTO: 91 % (ref 43–75)
PHOSPHATE SERPL-MCNC: 3.3 MG/DL (ref 2.3–4.1)
PLATELET # BLD AUTO: 264 THOUSANDS/UL (ref 149–390)
PLATELET BLD QL SMEAR: ADEQUATE
PMV BLD AUTO: 10 FL (ref 8.9–12.7)
POTASSIUM SERPL-SCNC: 4.3 MMOL/L (ref 3.5–5.3)
PROT SERPL-MCNC: 5.3 G/DL (ref 6.4–8.2)
RBC # BLD AUTO: 4.19 MILLION/UL (ref 3.88–5.62)
SODIUM SERPL-SCNC: 140 MMOL/L (ref 136–145)
TOTAL CELLS COUNTED SPEC: 100
WBC # BLD AUTO: 11.21 THOUSAND/UL (ref 4.31–10.16)

## 2018-03-10 PROCEDURE — 94762 N-INVAS EAR/PLS OXIMTRY CONT: CPT

## 2018-03-10 PROCEDURE — 80053 COMPREHEN METABOLIC PANEL: CPT | Performed by: INTERNAL MEDICINE

## 2018-03-10 PROCEDURE — 85027 COMPLETE CBC AUTOMATED: CPT | Performed by: INTERNAL MEDICINE

## 2018-03-10 PROCEDURE — 83735 ASSAY OF MAGNESIUM: CPT | Performed by: INTERNAL MEDICINE

## 2018-03-10 PROCEDURE — 84100 ASSAY OF PHOSPHORUS: CPT | Performed by: INTERNAL MEDICINE

## 2018-03-10 PROCEDURE — 94640 AIRWAY INHALATION TREATMENT: CPT

## 2018-03-10 PROCEDURE — 83605 ASSAY OF LACTIC ACID: CPT | Performed by: INTERNAL MEDICINE

## 2018-03-10 PROCEDURE — 94760 N-INVAS EAR/PLS OXIMETRY 1: CPT

## 2018-03-10 PROCEDURE — 99239 HOSP IP/OBS DSCHRG MGMT >30: CPT | Performed by: INTERNAL MEDICINE

## 2018-03-10 PROCEDURE — 85007 BL SMEAR W/DIFF WBC COUNT: CPT | Performed by: INTERNAL MEDICINE

## 2018-03-10 RX ORDER — PREDNISONE 10 MG/1
TABLET ORAL
Qty: 30 TABLET | Refills: 0 | Status: SHIPPED | OUTPATIENT
Start: 2018-03-10 | End: 2018-05-09 | Stop reason: HOSPADM

## 2018-03-10 RX ORDER — ERGOCALCIFEROL 1.25 MG/1
50000 CAPSULE ORAL WEEKLY
Qty: 7 CAPSULE | Refills: 0 | Status: SHIPPED | OUTPATIENT
Start: 2018-03-17

## 2018-03-10 RX ADMIN — METHYLPREDNISOLONE SODIUM SUCCINATE 40 MG: 40 INJECTION, POWDER, FOR SOLUTION INTRAMUSCULAR; INTRAVENOUS at 09:13

## 2018-03-10 RX ADMIN — ALUMINUM HYDROXIDE, MAGNESIUM HYDROXIDE, AND SIMETHICONE 5 ML: 200; 200; 20 SUSPENSION ORAL at 00:01

## 2018-03-10 RX ADMIN — NICOTINE 1 PATCH: 21 PATCH, EXTENDED RELEASE TRANSDERMAL at 09:15

## 2018-03-10 RX ADMIN — FOLIC ACID 1 MG: 1 TABLET ORAL at 09:13

## 2018-03-10 RX ADMIN — ERGOCALCIFEROL 50000 UNITS: 1.25 CAPSULE ORAL at 09:13

## 2018-03-10 RX ADMIN — LEVALBUTEROL 1.25 MG: 1.25 SOLUTION, CONCENTRATE RESPIRATORY (INHALATION) at 07:38

## 2018-03-10 RX ADMIN — ENOXAPARIN SODIUM 40 MG: 40 INJECTION SUBCUTANEOUS at 09:13

## 2018-03-10 RX ADMIN — CYANOCOBALAMIN TAB 500 MCG 250 MCG: 500 TAB at 09:13

## 2018-03-10 RX ADMIN — IPRATROPIUM BROMIDE 0.5 MG: 0.5 SOLUTION RESPIRATORY (INHALATION) at 07:38

## 2018-03-10 RX ADMIN — OXYCODONE HYDROCHLORIDE 5 MG: 5 TABLET ORAL at 00:53

## 2018-03-10 NOTE — NURSING NOTE
Pt wheeled off unit by RN, accompanied by family  Discharge instructions given to family/caregiver  Verbal understanding of same  Pt in no acute distress

## 2018-03-10 NOTE — DISCHARGE SUMMARY
Discharge Summary - [facility] Internal Medicine    Patient Information: Cassius Frank 74 y o  male MRN: [id number]  Unit/Bed#: 427-01 Encounter: [id number]    Discharging Physician / Practitioner: Wilmer Barry DO  PCP: Jessi Fitzgerald PA-C  Admission Date: 3/5/2018  Discharge Date: 03/10/18    Disposition:     Home with VNA Services (Reminder: Complete face to face encounter)    Reason for Admission:     The patient is a 74year old male known to me from previous hospitalization who presents to the ED from the office of his pulmonologist (Dr Jt Arias)  He complains of increasing and worsening cough, and bringing up green yellow sputum  Has been turning up his home o2 and using his inhalers more than prescribed  He usually wears 2L but was noted to be saturating in the 70s in pulmonary office where he required being placed on 6L to bring sats up to the 90s  He continues to smoke up to 3 packs a day  Discharge Diagnoses:     Principal Problem:    Acute on chronic respiratory failure with hypoxia and hypercapnia (HCC)  Active Problems:    Vitamin D deficiency    COPD with acute exacerbation (HCC)    Viral sepsis (HCC)    Acute tracheobronchitis    Tobacco abuse    BMI less than 19,adult    Severe protein-calorie malnutrition (HCC)    Hypoalbuminemia    Rhinovirus infection    Intractable abdominal pain  Resolved Problems: Moderate protein-calorie malnutrition [facility])        Hospital Course:     Cassius Frank is a 74 y o  male patient who originally presented to the hospital on 3/5/2018 due to acute on chronic respiratory failure with hypoxia and hypercapnia  Patient was treated for acute COPD exacerbation, suspected acute tracheobronchitis, likely with sepsis secondary to viral pathogen present on admission, viral panel positive for acute rhino virus infection  Treatment plan while hospitalized    1)  Acute on chronic respiratory failure with hypoxia and hypercapnia/COPD with acute exacerbation/Acute tracheobronchitis/Viral sepsis (present on admission) secondary to Rhinovirus infection:   patient was treated with IV antibiotics and IV steroids  Continue supplemental oxygen to maintain oxygen saturation levels of 90% and above  2) patient had intractable abdominal pain on 3/9/2018, currently resolved, CT scan unremarkable for any acute pathology, only significant finding is moderate stool  3)  Severe protein-calorie malnutrition/BMI less than 19/BMI=16 83/Hypoalbuminemia:  Continue the nutritional supplements per the Dietitian's recommendations  4)  Tobacco abuse:  Nicotine patch  Smoking cessation counseling  5)  Pulmonary nodule: He will need outpatient surveillance imaging  6)  Vitamin D deficiency: For the vitamin D deficiency, the patient was placed on Ergocalciferol 50,000 I  U  PO Qweekly for 8 weekly doses  He will then need a repeat Vitamin D 25-OH level checked in 2 months and will need a daily Cholecalciferol supplement prescribed by her PCP based on the repeat Vitamin D 25-OH level results  Patient with history of chronic back use, smokes 3 packs cigarettes per day, smoking cessation advised  Patient be discharged on continued oral prednisone taper  Condition at Discharge: good     Discharge Day Visit / Exam:     Subjective:  No acute complaints  Vitals: Blood Pressure: 124/62 (03/10/18 0729)  Pulse: 85 (03/10/18 0729)  Temperature: 98 9 °F (37 2 °C) (03/10/18 0729)  Temp Source: Temporal (03/10/18 0729)  Respirations: 18 (03/10/18 0729)  Height: 5' 6" (167 6 cm) (03/05/18 2117)  Weight - Scale: 47 3 kg (104 lb 4 4 oz) (03/05/18 2117)  SpO2: 96 % (2 l/m) (03/10/18 0740)  Exam:   Physical Exam   Constitutional: He is oriented to person, place, and time  No distress  Pulmonary/Chest: Effort normal  No respiratory distress  He has no wheezes  He has no rales  Musculoskeletal: Normal range of motion   He exhibits no edema, tenderness or deformity  Neurological: He is alert and oriented to person, place, and time  No cranial nerve deficit  Coordination normal    Skin: He is not diaphoretic  Psychiatric: He has a normal mood and affect  His behavior is normal  Judgment and thought content normal    Nursing note and vitals reviewed  Discharge instructions/Information to patient and family:   See after visit summary for information provided to patient and family  Provisions for Follow-Up Care:  See after visit summary for information related to follow-up care and any pertinent home health orders  Planned Readmission: no     Discharge Statement:  I spent 35 minutes discharging the patient  This time was spent on the day of discharge  I had direct contact with the patient on the day of discharge  Greater than 50% of the total time was spent examining patient, answering all patient questions, arranging and discussing plan of care with patient as well as directly providing post-discharge instructions  Additional time then spent on discharge activities  Discharge Medications:  See after visit summary for reconciled discharge medications provided to patient and family        ** Please Note: This note has been constructed using a voice recognition system **

## 2018-03-10 NOTE — PLAN OF CARE
Problem: Potential for Falls  Goal: Patient will remain free of falls  INTERVENTIONS:  - Assess patient frequently for physical needs  -  Identify cognitive and physical deficits and behaviors that affect risk of falls    -  [place] fall precautions as indicated by assessment   - Educate patient/family on patient safety including physical limitations  - Instruct patient to call for assistance with activity based on assessment  - Modify environment to reduce risk of injury  - Consider OT/PT consult to assist with strengthening/mobility   Outcome: Adequate for Discharge      Problem: PAIN - ADULT  Goal: Verbalizes/displays adequate comfort level or baseline comfort level  Interventions:  - Encourage patient to monitor pain and request assistance  - Assess pain using appropriate pain scale  - Administer analgesics based on type and severity of pain and evaluate response  - Implement non-pharmacological measures as appropriate and evaluate response  - Consider cultural and social influences on pain and pain management  - Notify physician/advanced practitioner if interventions unsuccessful or patient reports new pain   Outcome: Adequate for Discharge      Problem: INFECTION - ADULT  Goal: Absence or prevention of progression during hospitalization  INTERVENTIONS:  - Assess and monitor for signs and symptoms of infection  - Monitor lab/diagnostic results  - Monitor all insertion sites, i e  indwelling lines, tubes, and drains  - Monitor endotracheal (as able) and nasal secretions for changes in amount and color  - [place] appropriate cooling/warming therapies per order  - Administer medications as ordered  - Instruct and encourage patient and family to use good hand hygiene technique  - Identify and instruct in appropriate isolation precautions for identified infection/condition   Outcome: Adequate for Discharge    Goal: Absence of fever/infection during neutropenic period  INTERVENTIONS:  - Monitor WBC  - Implement neutropenic guidelines   Outcome: Adequate for Discharge      Problem: SAFETY ADULT  Goal: Maintain or return to baseline ADL function  INTERVENTIONS:  -  Assess patient's ability to carry out ADLs; assess patient's baseline for ADL function and identify physical deficits which impact ability to perform ADLs (bathing, care of mouth/teeth, toileting, grooming, dressing, etc )  - Assess/evaluate cause of self-care deficits   - Assess range of motion  - Assess patient's mobility; develop plan if impaired  - Assess patient's need for assistive devices and provide as appropriate  - Encourage maximum independence but intervene and supervise when necessary  ¯ Involve family in performance of ADLs  ¯ Assess for home care needs following discharge   ¯ Request OT consult to assist with ADL evaluation and planning for discharge  ¯ Provide patient education as appropriate   Outcome: Adequate for Discharge    Goal: Maintain or return mobility status to optimal level  INTERVENTIONS:  - Assess patient's baseline mobility status (ambulation, transfers, stairs, etc )    - Identify cognitive and physical deficits and behaviors that affect mobility  - Identify mobility aids required to assist with transfers and/or ambulation (gait belt, sit-to-stand, lift, walker, cane, etc )  - [place] fall precautions as indicated by assessment  - Record patient progress and toleration of activity level on Mobility SBAR; progress patient to next Phase/Stage  - Instruct patient to call for assistance with activity based on assessment  - Request Rehabilitation consult to assist with strengthening/weightbearing, etc    Outcome: Adequate for Discharge      Problem: DISCHARGE PLANNING  Goal: Discharge to home or other facility with appropriate resources  INTERVENTIONS:  - Identify barriers to discharge w/patient and caregiver  - Arrange for needed discharge resources and transportation as appropriate  - Identify discharge learning needs (meds, wound care, etc )  - Arrange for interpretive services to assist at discharge as needed  - Refer to Case Management Department for coordinating discharge planning if the patient needs post-hospital services based on physician/advanced practitioner order or complex needs related to functional status, cognitive ability, or social support system   Outcome: Adequate for Discharge      Problem: Knowledge Deficit  Goal: Patient/family/caregiver demonstrates understanding of disease process, treatment plan, medications, and discharge instructions  Complete learning assessment and assess knowledge base    Interventions:  - Provide teaching at level of understanding  - Provide teaching via preferred learning methods   Outcome: Adequate for Discharge      Problem: Prexisting or High Potential for Compromised Skin Integrity  Goal: Skin integrity is maintained or improved  INTERVENTIONS:  - Identify patients at risk for skin breakdown  - Assess and monitor skin integrity  - Assess and monitor nutrition and hydration status  - Monitor labs (i e  albumin)  - Assess for incontinence   - Turn and reposition patient  - Assist with mobility/ambulation  - Relieve pressure over bony prominences  - Avoid friction and shearing  - Provide appropriate hygiene as needed including keeping skin clean and dry  - Evaluate need for skin moisturizer/barrier cream  - Collaborate with interdisciplinary team (i e  Nutrition, Rehabilitation, etc )   - Patient/family teaching   Outcome: Adequate for Discharge      Problem: DISCHARGE PLANNING - CARE MANAGEMENT  Goal: Discharge to post-acute care or home with appropriate resources  INTERVENTIONS:  - Conduct assessment to determine patient/family and health care team treatment goals, and need for post-acute services based on payer coverage, community resources, and patient preferences, and barriers to discharge  - Address psychosocial, clinical, and financial barriers to discharge as identified in assessment in conjunction with the patient/family and health care team  - Arrange appropriate level of post-acute services according to patients   needs and preference and payer coverage in collaboration with the physician and health care team  - Communicate with and update the patient/family, physician, and health care team regarding progress on the discharge plan  - Arrange appropriate transportation to post-acute venues   Outcome: Adequate for Discharge      Problem: Nutrition/Hydration-ADULT  Goal: Nutrient/Hydration intake appropriate for improving, restoring or maintaining nutritional needs  Monitor and assess patient's nutrition/hydration status for malnutrition (ex- brittle hair, bruises, dry skin, pale skin and conjunctiva, muscle wasting, smooth red tongue, and disorientation)  Collaborate with interdisciplinary team and initiate plan and interventions as ordered  Monitor patient's weight and dietary intake as ordered or per policy  Utilize nutrition screening tool and intervene per policy  Determine patient's food preferences and provide high-protein, high-caloric foods as appropriate       INTERVENTIONS:  - Monitor oral intake, urinary output, labs, and treatment plans  - Assess nutrition and hydration status and recommend course of action  - Evaluate amount of meals eaten  - Assist patient with eating if necessary   - Allow adequate time for meals  - Recommend/ encourage appropriate diets, oral nutritional supplements, and vitamin/mineral supplements  - Order, calculate, and assess calorie counts as needed  - Recommend, monitor, and adjust tube feedings and TPN/PPN based on assessed needs  - Assess need for intravenous fluids  - Provide specific nutrition/hydration education as appropriate  - Include patient/family/caregiver in decisions related to nutrition   Outcome: Adequate for Discharge

## 2018-03-11 NOTE — SOCIAL WORK
Cm spoke with the patient and they are for d/c to home today  Cm is taking into account that the patient has preferences while planning the d/c needs  Cm plan was discussed with the patient and the patient is agreeable to the plan the patient does understand the importance of follow up care and taking the medications as prescribed  The patient is aware of any symptoms that he should look for when d/c  I spoke with the patients caregiver and she told me that the patient is suppose to use the o2 only at night and he has been using the o2 during the day I will make an appointment for the patient with pulmonary and pcp and Nevaeh ( the caregiver ) is aware and I will call her obn Monday with the appointments and she prefers the am if possible  I will also ask to have the patient evaluated for the o2 at all times He gets the o2 from [facility]  I will call Tommy Li on Monday too to see how often the patient is to use the o2 the patient was d/c [facility] the [facility] and they are agreeable to the [facility] and the patient signed the imm and him and the caregiver is agreeable to the d/c plan

## 2018-03-11 NOTE — PLAN OF CARE

## 2018-05-01 ENCOUNTER — APPOINTMENT (EMERGENCY)
Dept: ULTRASOUND IMAGING | Facility: HOSPITAL | Age: 73
DRG: 871 | End: 2018-05-01
Payer: MEDICARE

## 2018-05-01 ENCOUNTER — HOSPITAL ENCOUNTER (INPATIENT)
Facility: HOSPITAL | Age: 73
LOS: 8 days | Discharge: HOME HEALTH | DRG: 871 | End: 2018-05-09
Attending: EMERGENCY MEDICINE | Admitting: INTERNAL MEDICINE
Payer: MEDICARE

## 2018-05-01 ENCOUNTER — APPOINTMENT (EMERGENCY)
Dept: RADIOLOGY | Facility: HOSPITAL | Age: 73
DRG: 871 | End: 2018-05-01
Payer: MEDICARE

## 2018-05-01 ENCOUNTER — APPOINTMENT (EMERGENCY)
Dept: CT IMAGING | Facility: HOSPITAL | Age: 73
DRG: 871 | End: 2018-05-01
Payer: MEDICARE

## 2018-05-01 DIAGNOSIS — J96.21 ACUTE ON CHRONIC RESPIRATORY FAILURE WITH HYPOXIA AND HYPERCAPNIA (HCC): ICD-10-CM

## 2018-05-01 DIAGNOSIS — R32 URINARY INCONTINENCE, UNSPECIFIED TYPE: Primary | ICD-10-CM

## 2018-05-01 DIAGNOSIS — I21.9 ACUTE MI (HCC): ICD-10-CM

## 2018-05-01 DIAGNOSIS — I21.4 NSTEMI (NON-ST ELEVATED MYOCARDIAL INFARCTION) (HCC): ICD-10-CM

## 2018-05-01 DIAGNOSIS — R65.21 SEPTIC SHOCK (HCC): ICD-10-CM

## 2018-05-01 DIAGNOSIS — J96.22 ACUTE ON CHRONIC RESPIRATORY FAILURE WITH HYPOXIA AND HYPERCAPNIA (HCC): ICD-10-CM

## 2018-05-01 DIAGNOSIS — N49.2 SCROTAL ABSCESS: Primary | ICD-10-CM

## 2018-05-01 DIAGNOSIS — A41.9 SEPSIS (HCC): ICD-10-CM

## 2018-05-01 DIAGNOSIS — N49.2 ABSCESS OF SCROTAL WALL: ICD-10-CM

## 2018-05-01 DIAGNOSIS — A41.9 SEPTIC SHOCK (HCC): ICD-10-CM

## 2018-05-01 DIAGNOSIS — I95.9 HYPOTENSION: ICD-10-CM

## 2018-05-01 PROBLEM — G93.40 ACUTE ENCEPHALOPATHY: Status: ACTIVE | Noted: 2018-05-01

## 2018-05-01 PROBLEM — I50.33 ACUTE ON CHRONIC DIASTOLIC CONGESTIVE HEART FAILURE (HCC): Status: ACTIVE | Noted: 2018-05-01

## 2018-05-01 LAB
ALBUMIN SERPL BCP-MCNC: 3.2 G/DL (ref 3.5–5)
ALP SERPL-CCNC: 85 U/L (ref 46–116)
ALT SERPL W P-5'-P-CCNC: 19 U/L (ref 12–78)
ANION GAP SERPL CALCULATED.3IONS-SCNC: 5 MMOL/L (ref 4–13)
APTT PPP: 45 SECONDS (ref 23–35)
ARTERIAL PATENCY WRIST A: YES
ARTERIAL PATENCY WRIST A: YES
AST SERPL W P-5'-P-CCNC: 21 U/L (ref 5–45)
ATRIAL RATE: 128 BPM
BACTERIA UR QL AUTO: ABNORMAL /HPF
BASE EXCESS BLDA CALC-SCNC: -1.2 MMOL/L
BASE EXCESS BLDA CALC-SCNC: 1.6 MMOL/L
BASOPHILS # BLD AUTO: 0.03 THOUSANDS/ΜL (ref 0–0.1)
BASOPHILS NFR BLD AUTO: 0 % (ref 0–1)
BILIRUB SERPL-MCNC: 0.6 MG/DL (ref 0.2–1)
BILIRUB UR QL STRIP: ABNORMAL
BUN SERPL-MCNC: 18 MG/DL (ref 5–25)
CALCIUM SERPL-MCNC: 9.3 MG/DL (ref 8.3–10.1)
CHLORIDE SERPL-SCNC: 99 MMOL/L (ref 100–108)
CLARITY UR: CLEAR
CO2 SERPL-SCNC: 34 MMOL/L (ref 21–32)
COLOR UR: ABNORMAL
CREAT SERPL-MCNC: 1.01 MG/DL (ref 0.6–1.3)
DEPRECATED D DIMER PPP: 515 NG/ML (FEU) (ref 0–424)
EOSINOPHIL # BLD AUTO: 0 THOUSAND/ΜL (ref 0–0.61)
EOSINOPHIL NFR BLD AUTO: 0 % (ref 0–6)
ERYTHROCYTE [DISTWIDTH] IN BLOOD BY AUTOMATED COUNT: 13.4 % (ref 11.6–15.1)
GFR SERPL CREATININE-BSD FRML MDRD: 73 ML/MIN/1.73SQ M
GLUCOSE SERPL-MCNC: 127 MG/DL (ref 65–140)
GLUCOSE UR STRIP-MCNC: NEGATIVE MG/DL
HCO3 BLDA-SCNC: 27.7 MMOL/L (ref 22–28)
HCO3 BLDA-SCNC: 29.4 MMOL/L (ref 22–28)
HCT VFR BLD AUTO: 48.6 % (ref 36.5–49.3)
HGB BLD-MCNC: 15.9 G/DL (ref 12–17)
HGB UR QL STRIP.AUTO: ABNORMAL
IPAP: 18
KETONES UR STRIP-MCNC: ABNORMAL MG/DL
LACTATE SERPL-SCNC: 1.9 MMOL/L (ref 0.5–2)
LACTATE SERPL-SCNC: 2.2 MMOL/L (ref 0.5–2)
LACTATE SERPL-SCNC: 2.5 MMOL/L (ref 0.5–2)
LEUKOCYTE ESTERASE UR QL STRIP: NEGATIVE
LYMPHOCYTES # BLD AUTO: 0.54 THOUSANDS/ΜL (ref 0.6–4.47)
LYMPHOCYTES NFR BLD AUTO: 4 % (ref 14–44)
MAGNESIUM SERPL-MCNC: 1.7 MG/DL (ref 1.6–2.6)
MCH RBC QN AUTO: 30.6 PG (ref 26.8–34.3)
MCHC RBC AUTO-ENTMCNC: 32.7 G/DL (ref 31.4–37.4)
MCV RBC AUTO: 94 FL (ref 82–98)
MONOCYTES # BLD AUTO: 1.37 THOUSAND/ΜL (ref 0.17–1.22)
MONOCYTES NFR BLD AUTO: 10 % (ref 4–12)
MUCOUS THREADS UR QL AUTO: ABNORMAL
NASAL CANNULA: ABNORMAL
NEUTROPHILS # BLD AUTO: 12.47 THOUSANDS/ΜL (ref 1.85–7.62)
NEUTS SEG NFR BLD AUTO: 86 % (ref 43–75)
NITRITE UR QL STRIP: NEGATIVE
NON VENT- BIPAP: ABNORMAL
NON-SQ EPI CELLS URNS QL MICRO: ABNORMAL /HPF
O2 CT BLDA-SCNC: 19.7 ML/DL (ref 16–23)
O2 CT BLDA-SCNC: 20.3 ML/DL (ref 16–23)
OXYHGB MFR BLDA: 87.9 % (ref 94–97)
OXYHGB MFR BLDA: 93.9 % (ref 94–97)
P AXIS: 87 DEGREES
PCO2 BLDA: 59.1 MM HG (ref 36–44)
PCO2 BLDA: 63.5 MM HG (ref 36–44)
PEEP MAX SETTING VENT: 5 CM[H2O]
PH BLDA: 7.26 [PH] (ref 7.35–7.45)
PH BLDA: 7.31 [PH] (ref 7.35–7.45)
PH UR STRIP.AUTO: 5.5 [PH] (ref 4.5–8)
PLATELET # BLD AUTO: 243 THOUSANDS/UL (ref 149–390)
PMV BLD AUTO: 9.8 FL (ref 8.9–12.7)
PO2 BLDA: 61.1 MM HG (ref 75–129)
PO2 BLDA: 78.8 MM HG (ref 75–129)
POTASSIUM SERPL-SCNC: 4.2 MMOL/L (ref 3.5–5.3)
PR INTERVAL: 140 MS
PROCALCITONIN SERPL-MCNC: 12.33 NG/ML
PROT SERPL-MCNC: 8 G/DL (ref 6.4–8.2)
PROT UR STRIP-MCNC: ABNORMAL MG/DL
QRS AXIS: 4 DEGREES
QRSD INTERVAL: 72 MS
QT INTERVAL: 306 MS
QTC INTERVAL: 446 MS
RBC # BLD AUTO: 5.19 MILLION/UL (ref 3.88–5.62)
RBC #/AREA URNS AUTO: ABNORMAL /HPF
SODIUM SERPL-SCNC: 138 MMOL/L (ref 136–145)
SP GR UR STRIP.AUTO: 1.02 (ref 1–1.03)
SPECIMEN SOURCE: ABNORMAL
SPECIMEN SOURCE: ABNORMAL
T WAVE AXIS: 70 DEGREES
TROPONIN I SERPL-MCNC: <0.02 NG/ML
UROBILINOGEN UR QL STRIP.AUTO: 0.2 E.U./DL
VENT BIPAP FIO2: 28 %
VENTRICULAR RATE: 128 BPM
WBC # BLD AUTO: 14.41 THOUSAND/UL (ref 4.31–10.16)
WBC #/AREA URNS AUTO: ABNORMAL /HPF

## 2018-05-01 PROCEDURE — 85379 FIBRIN DEGRADATION QUANT: CPT | Performed by: EMERGENCY MEDICINE

## 2018-05-01 PROCEDURE — 85730 THROMBOPLASTIN TIME PARTIAL: CPT | Performed by: FAMILY MEDICINE

## 2018-05-01 PROCEDURE — 76870 US EXAM SCROTUM: CPT

## 2018-05-01 PROCEDURE — 87205 SMEAR GRAM STAIN: CPT | Performed by: EMERGENCY MEDICINE

## 2018-05-01 PROCEDURE — 96361 HYDRATE IV INFUSION ADD-ON: CPT

## 2018-05-01 PROCEDURE — 87086 URINE CULTURE/COLONY COUNT: CPT | Performed by: FAMILY MEDICINE

## 2018-05-01 PROCEDURE — 83605 ASSAY OF LACTIC ACID: CPT | Performed by: FAMILY MEDICINE

## 2018-05-01 PROCEDURE — 87070 CULTURE OTHR SPECIMN AEROBIC: CPT | Performed by: EMERGENCY MEDICINE

## 2018-05-01 PROCEDURE — 99285 EMERGENCY DEPT VISIT HI MDM: CPT

## 2018-05-01 PROCEDURE — 71045 X-RAY EXAM CHEST 1 VIEW: CPT

## 2018-05-01 PROCEDURE — 83605 ASSAY OF LACTIC ACID: CPT | Performed by: INTERNAL MEDICINE

## 2018-05-01 PROCEDURE — 0VB5XZZ EXCISION OF SCROTUM, EXTERNAL APPROACH: ICD-10-PCS | Performed by: FAMILY MEDICINE

## 2018-05-01 PROCEDURE — 87147 CULTURE TYPE IMMUNOLOGIC: CPT | Performed by: EMERGENCY MEDICINE

## 2018-05-01 PROCEDURE — 94660 CPAP INITIATION&MGMT: CPT

## 2018-05-01 PROCEDURE — 36415 COLL VENOUS BLD VENIPUNCTURE: CPT | Performed by: EMERGENCY MEDICINE

## 2018-05-01 PROCEDURE — 87631 RESP VIRUS 3-5 TARGETS: CPT | Performed by: FAMILY MEDICINE

## 2018-05-01 PROCEDURE — 82805 BLOOD GASES W/O2 SATURATION: CPT | Performed by: INTERNAL MEDICINE

## 2018-05-01 PROCEDURE — 84145 PROCALCITONIN (PCT): CPT | Performed by: FAMILY MEDICINE

## 2018-05-01 PROCEDURE — 87040 BLOOD CULTURE FOR BACTERIA: CPT | Performed by: EMERGENCY MEDICINE

## 2018-05-01 PROCEDURE — 93010 ELECTROCARDIOGRAM REPORT: CPT | Performed by: INTERNAL MEDICINE

## 2018-05-01 PROCEDURE — 71275 CT ANGIOGRAPHY CHEST: CPT

## 2018-05-01 PROCEDURE — 93005 ELECTROCARDIOGRAM TRACING: CPT

## 2018-05-01 PROCEDURE — 83735 ASSAY OF MAGNESIUM: CPT | Performed by: EMERGENCY MEDICINE

## 2018-05-01 PROCEDURE — 36600 WITHDRAWAL OF ARTERIAL BLOOD: CPT

## 2018-05-01 PROCEDURE — 82805 BLOOD GASES W/O2 SATURATION: CPT | Performed by: EMERGENCY MEDICINE

## 2018-05-01 PROCEDURE — 80053 COMPREHEN METABOLIC PANEL: CPT | Performed by: EMERGENCY MEDICINE

## 2018-05-01 PROCEDURE — 85025 COMPLETE CBC W/AUTO DIFF WBC: CPT | Performed by: EMERGENCY MEDICINE

## 2018-05-01 PROCEDURE — 94760 N-INVAS EAR/PLS OXIMETRY 1: CPT

## 2018-05-01 PROCEDURE — 87186 SC STD MICRODIL/AGAR DIL: CPT | Performed by: EMERGENCY MEDICINE

## 2018-05-01 PROCEDURE — 87077 CULTURE AEROBIC IDENTIFY: CPT | Performed by: EMERGENCY MEDICINE

## 2018-05-01 PROCEDURE — 81001 URINALYSIS AUTO W/SCOPE: CPT | Performed by: EMERGENCY MEDICINE

## 2018-05-01 PROCEDURE — 96374 THER/PROPH/DIAG INJ IV PUSH: CPT

## 2018-05-01 PROCEDURE — 94640 AIRWAY INHALATION TREATMENT: CPT

## 2018-05-01 PROCEDURE — 02HV33Z INSERTION OF INFUSION DEVICE INTO SUPERIOR VENA CAVA, PERCUTANEOUS APPROACH: ICD-10-PCS | Performed by: FAMILY MEDICINE

## 2018-05-01 PROCEDURE — 99223 1ST HOSP IP/OBS HIGH 75: CPT | Performed by: FAMILY MEDICINE

## 2018-05-01 PROCEDURE — 84484 ASSAY OF TROPONIN QUANT: CPT | Performed by: EMERGENCY MEDICINE

## 2018-05-01 RX ORDER — SODIUM CHLORIDE 9 MG/ML
75 INJECTION, SOLUTION INTRAVENOUS CONTINUOUS
Status: DISCONTINUED | OUTPATIENT
Start: 2018-05-01 | End: 2018-05-04

## 2018-05-01 RX ORDER — CLINDAMYCIN HYDROCHLORIDE 150 MG/1
450 CAPSULE ORAL ONCE
Status: COMPLETED | OUTPATIENT
Start: 2018-05-01 | End: 2018-05-01

## 2018-05-01 RX ORDER — SODIUM CHLORIDE FOR INHALATION 0.9 %
3 VIAL, NEBULIZER (ML) INHALATION EVERY 6 HOURS PRN
Status: DISCONTINUED | OUTPATIENT
Start: 2018-05-01 | End: 2018-05-07

## 2018-05-01 RX ORDER — ACETAMINOPHEN 325 MG/1
650 TABLET ORAL EVERY 6 HOURS PRN
Status: DISCONTINUED | OUTPATIENT
Start: 2018-05-01 | End: 2018-05-01

## 2018-05-01 RX ORDER — METHYLPREDNISOLONE SODIUM SUCCINATE 125 MG/2ML
125 INJECTION, POWDER, LYOPHILIZED, FOR SOLUTION INTRAMUSCULAR; INTRAVENOUS ONCE
Status: COMPLETED | OUTPATIENT
Start: 2018-05-01 | End: 2018-05-01

## 2018-05-01 RX ORDER — SODIUM CHLORIDE FOR INHALATION 0.9 %
3 VIAL, NEBULIZER (ML) INHALATION ONCE
Status: COMPLETED | OUTPATIENT
Start: 2018-05-01 | End: 2018-05-01

## 2018-05-01 RX ORDER — LEVALBUTEROL 1.25 MG/.5ML
1.25 SOLUTION, CONCENTRATE RESPIRATORY (INHALATION)
Status: DISCONTINUED | OUTPATIENT
Start: 2018-05-01 | End: 2018-05-07

## 2018-05-01 RX ORDER — LORAZEPAM 2 MG/ML
0.5 INJECTION INTRAMUSCULAR EVERY 4 HOURS PRN
Status: DISCONTINUED | OUTPATIENT
Start: 2018-05-01 | End: 2018-05-09 | Stop reason: HOSPADM

## 2018-05-01 RX ORDER — METHYLPREDNISOLONE SODIUM SUCCINATE 40 MG/ML
40 INJECTION, POWDER, LYOPHILIZED, FOR SOLUTION INTRAMUSCULAR; INTRAVENOUS EVERY 8 HOURS SCHEDULED
Status: DISCONTINUED | OUTPATIENT
Start: 2018-05-01 | End: 2018-05-03

## 2018-05-01 RX ORDER — ACETAMINOPHEN 650 MG/1
650 SUPPOSITORY RECTAL EVERY 6 HOURS PRN
Status: DISCONTINUED | OUTPATIENT
Start: 2018-05-01 | End: 2018-05-01

## 2018-05-01 RX ORDER — FUROSEMIDE 10 MG/ML
20 INJECTION INTRAMUSCULAR; INTRAVENOUS
Status: DISCONTINUED | OUTPATIENT
Start: 2018-05-01 | End: 2018-05-01

## 2018-05-01 RX ORDER — FUROSEMIDE 10 MG/ML
20 INJECTION INTRAMUSCULAR; INTRAVENOUS ONCE
Status: COMPLETED | OUTPATIENT
Start: 2018-05-01 | End: 2018-05-01

## 2018-05-01 RX ORDER — ACETAMINOPHEN 650 MG/1
650 SUPPOSITORY RECTAL EVERY 6 HOURS PRN
Status: DISCONTINUED | OUTPATIENT
Start: 2018-05-01 | End: 2018-05-08

## 2018-05-01 RX ORDER — LEVALBUTEROL 1.25 MG/.5ML
1.25 SOLUTION, CONCENTRATE RESPIRATORY (INHALATION) EVERY 6 HOURS PRN
Status: DISCONTINUED | OUTPATIENT
Start: 2018-05-01 | End: 2018-05-09 | Stop reason: HOSPADM

## 2018-05-01 RX ADMIN — CLINDAMYCIN HYDROCHLORIDE 450 MG: 150 CAPSULE ORAL at 06:00

## 2018-05-01 RX ADMIN — CEFEPIME HYDROCHLORIDE 2000 MG: 2 INJECTION, SOLUTION INTRAVENOUS at 12:36

## 2018-05-01 RX ADMIN — FUROSEMIDE 20 MG: 10 INJECTION, SOLUTION INTRAMUSCULAR; INTRAVENOUS at 08:01

## 2018-05-01 RX ADMIN — CEFEPIME HYDROCHLORIDE 2000 MG: 2 INJECTION, SOLUTION INTRAVENOUS at 23:12

## 2018-05-01 RX ADMIN — IOHEXOL 85 ML: 350 INJECTION, SOLUTION INTRAVENOUS at 05:26

## 2018-05-01 RX ADMIN — METHYLPREDNISOLONE SODIUM SUCCINATE 125 MG: 125 INJECTION, POWDER, FOR SOLUTION INTRAMUSCULAR; INTRAVENOUS at 09:00

## 2018-05-01 RX ADMIN — METHYLPREDNISOLONE SODIUM SUCCINATE 40 MG: 40 INJECTION, POWDER, FOR SOLUTION INTRAMUSCULAR; INTRAVENOUS at 22:59

## 2018-05-01 RX ADMIN — IPRATROPIUM BROMIDE 0.5 MG: 0.5 SOLUTION RESPIRATORY (INHALATION) at 13:34

## 2018-05-01 RX ADMIN — LORAZEPAM 0.5 MG: 2 INJECTION INTRAMUSCULAR; INTRAVENOUS at 20:34

## 2018-05-01 RX ADMIN — IPRATROPIUM BROMIDE 0.5 MG: 0.5 SOLUTION RESPIRATORY (INHALATION) at 20:39

## 2018-05-01 RX ADMIN — SODIUM CHLORIDE 50 ML/HR: 0.9 INJECTION, SOLUTION INTRAVENOUS at 17:04

## 2018-05-01 RX ADMIN — ALBUTEROL SULFATE 10 MG: 2.5 SOLUTION RESPIRATORY (INHALATION) at 02:31

## 2018-05-01 RX ADMIN — LEVALBUTEROL 1.25 MG: 1.25 SOLUTION, CONCENTRATE RESPIRATORY (INHALATION) at 20:39

## 2018-05-01 RX ADMIN — VANCOMYCIN HYDROCHLORIDE 500 MG: 500 INJECTION, POWDER, LYOPHILIZED, FOR SOLUTION INTRAVENOUS at 13:52

## 2018-05-01 RX ADMIN — SODIUM CHLORIDE 500 ML: 0.9 INJECTION, SOLUTION INTRAVENOUS at 10:28

## 2018-05-01 RX ADMIN — ISODIUM CHLORIDE 3 ML: 0.03 SOLUTION RESPIRATORY (INHALATION) at 02:31

## 2018-05-01 RX ADMIN — IPRATROPIUM BROMIDE 1 MG: 0.5 SOLUTION RESPIRATORY (INHALATION) at 02:31

## 2018-05-01 RX ADMIN — SODIUM CHLORIDE 500 ML: 0.9 INJECTION, SOLUTION INTRAVENOUS at 18:08

## 2018-05-01 RX ADMIN — VANCOMYCIN HYDROCHLORIDE 500 MG: 500 INJECTION, POWDER, LYOPHILIZED, FOR SOLUTION INTRAVENOUS at 23:44

## 2018-05-01 RX ADMIN — SODIUM CHLORIDE 500 ML: 0.9 INJECTION, SOLUTION INTRAVENOUS at 14:45

## 2018-05-01 RX ADMIN — ACETAMINOPHEN 650 MG: 650 SUPPOSITORY RECTAL at 13:13

## 2018-05-01 RX ADMIN — SODIUM CHLORIDE 500 ML: 0.9 INJECTION, SOLUTION INTRAVENOUS at 06:01

## 2018-05-01 RX ADMIN — LEVALBUTEROL 1.25 MG: 1.25 SOLUTION, CONCENTRATE RESPIRATORY (INHALATION) at 13:34

## 2018-05-01 RX ADMIN — FLUTICASONE PROPIONATE AND SALMETEROL 1 PUFF: 50; 250 POWDER RESPIRATORY (INHALATION) at 12:37

## 2018-05-01 RX ADMIN — ENOXAPARIN SODIUM 40 MG: 40 INJECTION SUBCUTANEOUS at 12:36

## 2018-05-01 NOTE — ASSESSMENT & PLAN NOTE
Fever 102, leukocytosis, tachycardia, tachypnea  Unclear source, suspected secondary to scrotal wound, will obtain sepsis workup  Continue empiric treatment with cefepime and vancomycin  Obtain lactic acid level  Received IV fluid boluses, will now place on IV Lasix secondary to evidence pulmonary congestion

## 2018-05-01 NOTE — ASSESSMENT & PLAN NOTE
Continue Solu-Medrol  Placed on respiratory protocol  Monitor ABG, patient was on BiPAP but was tolerating the mask orally, taken of BiPAP oxygen saturation around 88% on 2 L nasal cannula, continue via nasal cannula for now, ABG pending

## 2018-05-01 NOTE — ED PROVIDER NOTES
History  Chief Complaint   Patient presents with    Shortness of Breath     chronic condition, pt stated cant breathe, care taker stated thism is normal for this pt      17-year-old male with history of COPD presents with increasing shortness of breath which is chronic according to caregiver     Patient is on 2 L chronically at home he has had a cough which he swallows the sputum and an open sore to his left scrotum has been noted by home health nurses yesterday there is no history of any fever he has been continuously using his meter dose inhalers no nausea vomiting no abdominal pain his appetite is poor at baseline  Patient denies any chest pain or abdominal pain no nausea vomiting no dysuria or increased urinary frequency  Prior to Admission Medications   Prescriptions Last Dose Informant Patient Reported? Taking?    Disposable Gloves (NITRILE EXAM GLOVES LARGE) MISC   No No   Sig: USE AS DIRECTED DAILY   Incontinence Supplies MISC   No No   Sig: Use as directed   Incontinence Supply Disposable (ADHERES INCONTINENCE PAD) MISC   No No   Sig: USE AS DIRECTED DAILY   Incontinence Supply Disposable ([employer] CLE[employer] WIPES) MISC   Yes No   Sig: [employer] Cleans[employer] Wipes Miscellaneous use as directed  Quantity: 1;  Refills: 5    Julia HITCHCOCK;  Started 12-Nov-2015 [id number] Miscellaneous Package (3 Packages)   Incontinence Supply Disposable (DEPEND GUARDS FOR MEN) MISC   Yes No   Sig: Depend Guards for Men Miscellaneous use as directed  Quantity: 1;  Refills: 5    Julia HITCHCOCK;  Started 12-Nov-2015 [id number] Miscellaneous Package (2 Packages)   Incontinence Supply Disposable (DEPEND UNDERGARMENTS) MISC   Yes No   Sig: Depend Undergarments Miscellaneous use as directed  Quantity: 100;  Refills: 5    Moy Ibarra;  Started 12-Nov-2015 Active   Ipratropium-Albuterol (COMBIVENT RESPIMAT)  MCG/ACT AERS   Yes No   Sig: Combivent Respimat  MCG/ACT Inhalation Aerosol Solution INHALE 1 PUFF 4 TIMES DAILY (MAXIMUM OF 6 PUFFS IN 24 HOURS)  Quantity: 1;  Refills: 3    Jamse Michael CRNP; Active   Mometasone Furo-Formoterol Fum (DULERA) 200-5 MCG/ACT AERO   Yes No   Sig: Dulera 200-5 MCG/ACT Inhalation Aerosol INHALE 2 PUFFS TWICE DAILY  RINSE MOUTH AFTER USE  Quantity: 0;  Refills: 0   , M D ; Active   cyanocobalamin 250 MCG tablet   No No   Sig: Take 1 tablet by mouth daily   dextromethorphan-guaiFENesin (ROBITUSSIN DM)  mg/5 mL syrup   No No   Sig: Take 10 mL by mouth every 4 (four) hours as needed for cough   ergocalciferol (VITAMIN D2) 50,000 units   No No   Sig: Take 1 capsule (50,000 Units total) by mouth once a week   folic acid (FOLVITE) 1 mg tablet   No No   Sig: Take 1 tablet by mouth daily   ipratropium-albuterol (COMBIVENT RESPIMAT) inhaler  Self Yes No   Sig: Inhale 1 puff every 6 (six) hours   predniSONE 10 mg tablet   No No   Si mg daily for 3 days, 30 mg daily for 3 days, 20 mg daily 3 days, 10 mg daily for 3 days  Facility-Administered Medications: None       Past Medical History:   Diagnosis Date    Anemia     Asthma     COPD (chronic obstructive pulmonary disease) (HCC)     Coronary artery disease     Heart disease     Left wrist injury     Low testosterone     [facility] [address]     Vitamin D deficiency        History reviewed  No pertinent surgical history  History reviewed  No pertinent family history  I have reviewed and agree with the history as documented  Social History   Substance Use Topics    Smoking status: Heavy Tobacco Smoker     Packs/day: 1 00     Years: 59 00     Types: Cigarettes    Smokeless tobacco: Never Used    Alcohol use No        Review of Systems   Constitutional: Negative for activity change, chills and fever  HENT: Negative for congestion, ear pain, rhinorrhea, sneezing and sore throat  Eyes: Negative for discharge  Respiratory: Positive for shortness of breath and wheezing  Negative for cough      Cardiovascular: Negative for chest pain and leg swelling  Gastrointestinal: Negative for abdominal pain, blood in stool, diarrhea, nausea and vomiting  Endocrine: Negative for polyuria  Genitourinary: Negative for difficulty urinating, dysuria, frequency and urgency  Musculoskeletal: Negative for back pain and myalgias  Skin: Positive for wound (scrotum)  Negative for rash  Neurological: Negative for dizziness, weakness, numbness and headaches  Hematological: Negative for adenopathy  Psychiatric/Behavioral: Negative for confusion  All other systems reviewed and are negative  Physical Exam  ED Triage Vitals   Temperature Pulse Respirations Blood Pressure SpO2   05/01/18 0127 05/01/18 0127 05/01/18 0127 05/01/18 0127 05/01/18 0127   99 6 °F (37 6 °C) (!) 133 20 138/80 96 %      Temp Source Heart Rate Source Patient Position - Orthostatic VS BP Location FiO2 (%)   05/01/18 0127 05/01/18 0127 05/01/18 0127 05/01/18 0127 --   Temporal Monitor Sitting Right arm       Pain Score       05/01/18 0700       No Pain           Orthostatic Vital Signs  Vitals:    05/01/18 1300 05/01/18 1500 05/01/18 1526 05/01/18 1600   BP: (!) 88/55 (!) 79/50 (!) 83/55 103/70   Pulse: (!) 114 102 98 100   Patient Position - Orthostatic VS:   Lying        Physical Exam   Constitutional: He is oriented to person, place, and time  He appears well-developed  Malnourished; hard of hearing   HENT:   Head: Normocephalic  Right Ear: External ear normal    Left Ear: External ear normal    Nose: Nose normal    Mouth/Throat: Oropharynx is clear and moist  No oropharyngeal exudate  TMS pale   Eyes: Conjunctivae and EOM are normal  Pupils are equal, round, and reactive to light  Right eye exhibits no discharge  Left eye exhibits no discharge  Neck: Normal range of motion  Neck supple  Cardiovascular: Regular rhythm, normal heart sounds and intact distal pulses  Tachy 120-130   Pulmonary/Chest: He is in respiratory distress (mild)  He has no wheezes  Tachypnea occasional cough speaks short sentences; dimished BS   Abdominal: Soft  Bowel sounds are normal  He exhibits no distension and no mass  There is no tenderness  There is no guarding  Back no midline or CVA tenderness   Genitourinary:   Genitourinary Comments: chaparoned by Bisi RN: circumcised male 2cm open left scrotal abscess  No  testicular tenderness no masses  No scrotal edema or erythema  Musculoskeletal: Normal range of motion  He exhibits no edema, tenderness or deformity  Lymphadenopathy:     He has no cervical adenopathy  Neurological: He is alert and oriented to person, place, and time  No cranial nerve deficit or sensory deficit  He exhibits normal muscle tone  Coordination normal    Skin: Skin is warm and dry  Capillary refill takes less than 2 seconds  Psychiatric:   flat   Vitals reviewed        ED Medications  Medications   fluticasone-salmeterol (ADVAIR) 250-50 mcg/dose inhaler 1 puff (1 puff Inhalation Given 5/1/18 1237)   vancomycin (VANCOCIN) 500 mg in sodium chloride 0 9 % 100 mL IVPB (500 mg Intravenous New Bag 5/1/18 1352)   cefepime (MAXIPIME) IVPB (premix) 2,000 mg (2,000 mg Intravenous New Bag 5/1/18 1236)   levalbuterol (XOPENEX) inhalation solution 1 25 mg (1 25 mg Nebulization Given 5/1/18 1334)   ipratropium (ATROVENT) 0 02 % inhalation solution 0 5 mg (0 5 mg Nebulization Given 5/1/18 1334)   methylPREDNISolone sodium succinate (Solu-MEDROL) injection 40 mg (not administered)   enoxaparin (LOVENOX) subcutaneous injection 40 mg (40 mg Subcutaneous Given 5/1/18 1236)   levalbuterol (XOPENEX) inhalation solution 1 25 mg (not administered)   sodium chloride 0 9 % inhalation solution 3 mL (not administered)   LORazepam (ATIVAN) 2 mg/mL injection 0 5 mg (not administered)   acetaminophen (TYLENOL) rectal suppository 650 mg (not administered)   sodium chloride 0 9 % infusion (not administered)   albuterol inhalation solution 10 mg (10 mg Nebulization Given 5/1/18 0231) ipratropium (ATROVENT) 0 02 % inhalation solution 1 mg (1 mg Nebulization Given 5/1/18 0231)   sodium chloride 0 9 % inhalation solution 3 mL (3 mL Nebulization Given 5/1/18 0231)   iohexol (OMNIPAQUE) 350 MG/ML injection (SINGLE-DOSE) 85 mL (85 mL Intravenous Given 5/1/18 0526)   clindamycin (CLEOCIN) capsule 450 mg (450 mg Oral Given 5/1/18 0600)   sodium chloride 0 9 % bolus 500 mL (0 mL Intravenous Stopped 5/1/18 0729)   furosemide (LASIX) injection 20 mg (20 mg Intravenous Given 5/1/18 0801)   methylPREDNISolone sodium succinate (Solu-MEDROL) injection 125 mg (125 mg Intravenous Given 5/1/18 0900)   sodium chloride 0 9 % bolus 500 mL (0 mL Intravenous Stopped 5/1/18 1228)   sodium chloride 0 9 % bolus 500 mL (0 mL Intravenous Stopped 5/1/18 1515)       Diagnostic Studies  Results Reviewed     Procedure Component Value Units Date/Time    Wound culture and Gram stain [[id number]] Collected:  05/01/18 0218    Lab Status:  Preliminary result Specimen:  Wound from Genital Updated:  05/01/18 1539     Gram Stain Result 1+ Polys      1+ Gram positive cocci in clusters    Blood gas, arterial [[id number]]  (Abnormal) Collected:  05/01/18 1140    Lab Status:  Final result Specimen:  Blood, Arterial from Radial, Right Updated:  05/01/18 1204     pH, Arterial 7 314 (L)     pCO2, Arterial 59 1 (HH) mm Hg      pO2, Arterial 78 8 mm Hg      HCO3, Arterial 29 4 (H) mmol/L      Base Excess, Arterial 1 6 mmol/L      O2 Content, Arterial 20 3 mL/dL      O2 HGB,Arterial  93 9 (L) %      SOURCE Radial, Right     WICHO TEST Yes     Nasal Cannula 2 5 l/m  pt  ripped mask off    Bipap off for about 10 minutes    Blood gas, arterial [[id number]]  (Abnormal) Collected:  05/01/18 0812    Lab Status:  Final result Specimen:  Blood, Arterial from Radial, Left Updated:  05/01/18 0823     pH, Arterial 7 257 (L)     pCO2, Arterial 63 5 (HH) mm Hg      pO2, Arterial 61 1 (L) mm Hg      HCO3, Arterial 27 7 mmol/L      Base Excess, Arterial -1 2 mmol/L      O2 Content, Arterial 19 7 mL/dL      O2 HGB,Arterial  87 9 (L) %      SOURCE Radial, Left     WICHO TEST Yes     Non Vent type BIPAP BIPAP     IPAP 18     EPAP 5     BIPAP fio2 28 %     Urine Microscopic [[id number]]  (Abnormal) Collected:  05/01/18 0609    Lab Status:  Final result Specimen:  Urine from Urine, Clean Catch Updated:  05/01/18 0634     RBC, UA 4-10 (A) /hpf      WBC, UA 0-1 (A) /hpf      Epithelial Cells None Seen /hpf      Bacteria, UA Occasional /hpf      MUCOUS THREADS Moderate (A)    UA w Reflex to Microscopic w Reflex to Culture [[id number]]  (Abnormal) Collected:  05/01/18 0609    Lab Status:  Final result Specimen:  Urine from Urine, Clean Catch Updated:  05/01/18 8829     Color, UA Toma     Clarity, UA Clear     Specific Gravity, UA 1 025     pH, UA 5 5     Leukocytes, UA Negative     Nitrite, UA Negative     Protein, UA 30 (1+) (A) mg/dl      Glucose, UA Negative mg/dl      Ketones, UA Trace (A) mg/dl      Urobilinogen, UA 0 2 E U /dl      Bilirubin, UA Interference- unable to analyze (A)     Blood, UA Large (A)    Troponin I [[id number]]  (Normal) Collected:  05/01/18 0227    Lab Status:  Final result Specimen:  Blood from Arm, Right Updated:  05/01/18 0249     Troponin I <0 02 ng/mL     Narrative:         [employer] Chemistry analyzer 99% cutoff is > 0 04 ng/mL in network labs    o cTnI 99% cutoff is useful only when applied to patients in the clinical setting of myocardial ischemia  o cTnI 99% cutoff should be interpreted in the context of clinical history, ECG findings and possibly cardiac imaging to establish correct diagnosis  o cTnI 99% cutoff may be suggestive but clearly not indicative of a coronary event without the clinical setting of myocardial ischemia      Magnesium [[id number]]  (Normal) Collected:  05/01/18 0227    Lab Status:  Final result Specimen:  Blood from Arm, Right Updated:  05/01/18 0247     Magnesium 1 7 mg/dL     Comprehensive metabolic panel [[id number]] (Abnormal) Collected:  05/01/18 0227    Lab Status:  Final result Specimen:  Blood from Arm, Right Updated:  05/01/18 0247     Sodium 138 mmol/L      Potassium 4 2 mmol/L      Chloride 99 (L) mmol/L      CO2 34 (H) mmol/L      Anion Gap 5 mmol/L      BUN 18 mg/dL      Creatinine 1 01 mg/dL      Glucose 127 mg/dL      Calcium 9 3 mg/dL      AST 21 U/L      ALT 19 U/L      Alkaline Phosphatase 85 U/L      Total Protein 8 0 g/dL      Albumin 3 2 (L) g/dL      Total Bilirubin 0 60 mg/dL      eGFR 73 ml/min/1 73sq m     Narrative:         National Kidney Disease Education Program recommendations are as follows:  GFR calculation is accurate only with a steady state creatinine  Chronic Kidney disease less than 60 ml/min/1 73 sq  meters  Kidney failure less than 15 ml/min/1 73 sq  meters  D-Dimer [[id number]]  (Abnormal) Collected:  05/01/18 0227    Lab Status:  Final result Specimen:  Blood from Arm, Right Updated:  05/01/18 0247     D-Dimer, Quant 515 (H) ng/ml (FEU)     Blood culture #1 [[id number]] Collected:  05/01/18 0234    Lab Status:   In process Specimen:  Blood from Arm, Left Updated:  05/01/18 0236    CBC and differential [[id number]]  (Abnormal) Collected:  05/01/18 0227    Lab Status:  Final result Specimen:  Blood from Arm, Right Updated:  05/01/18 0232     WBC 14 41 (H) Thousand/uL      RBC 5 19 Million/uL      Hemoglobin 15 9 g/dL      Hematocrit 48 6 %      MCV 94 fL      MCH 30 6 pg      MCHC 32 7 g/dL      RDW 13 4 %      MPV 9 8 fL      Platelets 144 Thousands/uL      Neutrophils Relative 86 (H) %      Lymphocytes Relative 4 (L) %      Monocytes Relative 10 %      Eosinophils Relative 0 %      Basophils Relative 0 %      Neutrophils Absolute 12 47 (H) Thousands/µL      Lymphocytes Absolute 0 54 (L) Thousands/µL      Monocytes Absolute 1 37 (H) Thousand/µL      Eosinophils Absolute 0 00 Thousand/µL      Basophils Absolute 0 03 Thousands/µL     Blood culture #2 [[id number]] Collected:  05/01/18 0227    Lab Status: In process Specimen:  Blood from Arm, Right Updated:  05/01/18 0230                 XR chest 1 view portable   ED Interpretation by Sonia Ortiz MD (15/67 4162)   Read by me; Radiologist to provide formal interpretation  Increased pulmonary vasculature vs  previous      Final Result by AUN Conde MD (05/01 5835)      Developing pulmonary vascular congestion and mild interstitial edema superimposed upon severe emphysematous changes  Right upper lobe nodule (6 mm)  Workstation performed: [id number]         XR chest 1 view portable   ED Interpretation by Sonia Ortiz MD (76/26 8993)   Read by me; Radiologist to provide formal interpretation  Slight increased marking rt base vs  3/5/18      Final Result by Manuel Perry MD (05/01 2565)      Severe pulmonary emphysema  No acute pulmonary disease            Workstation performed: [id number]         US scrotum and testicles   Final Result by Elidia Ovalle MD (05/01 4757)       Normal Doppler flow to both testicles  Moderate bilateral complex hydroceles  No intrascrotal abscess or abnormal mass  Workstation performed: [id number]         CTA ED chest PE study   Final Result by Charbel Cordero MD (05/01 1037)         1  No pulmonary embolism  2   Stable 5 mm nodule anteriorly in the right upper lobe  3   Severe emphysema  4   9 mm subpleural area of fissural nodule in the left upper lobe  There was fluid in this region on the prior study  Recommend 3 month follow-up  5   Left lower lobe atelectasis which is stable           Workstation performed: [id number]                    Procedures  ECG 12 Lead Documentation  Date/Time: 5/1/2018 1:32 AM  Performed by: Fan Barth  Authorized by: Fan Barth     Indications / Diagnosis:  Sob  ECG reviewed by me, the ED Provider: yes    Patient location:  ED  Previous ECG:     Previous ECG:  Compared to current    Comparison ECG info: 3/5/18    Similarity:  No change  Rate:     ECG rate:  128    ECG rate assessment: tachycardic    Rhythm:     Rhythm: sinus tachycardia    Comments:      Nonspecific twave changes  Incision/Drainage  Date/Time: 5/1/2018 2:00 AM  Performed by: Ranjan Weaver  Authorized by: Ranjan Weaver     Patient location:  ED  Other Assisting Provider: Yes (comment) Arlene Mendez RN)    Consent:     Consent obtained:  Verbal    Consent given by:  Patient    Risks discussed:  Bleeding, incomplete drainage and infection  Universal protocol:     Procedure explained and questions answered to patient or proxy's satisfaction: yes      Patient identity confirmed:  Verbally with patient and arm band  Location:     Type:  Abscess    Size:  2 cm    Location:  Anogenital    Anogenital location:  Scrotal wall (left)  Pre-procedure details:     Skin preparation:  Hibiclens  Anesthesia (see MAR for exact dosages): Anesthesia method:  Local infiltration    Local anesthetic:  Lidocaine 2% WITH epi  Procedure details:     Complexity:  Simple    Needle aspiration: no      Incision type: wound open debrided utilizing iris sissors  Approach:  Open    Incision depth:  Subcutaneous    Wound management:  Probed and deloculated and irrigated with saline    Drainage:  Purulent    Drainage amount:  Scant    Wound treatment:  Packing placed    Packing materials:  1/4 in iodoform gauze    Amount 1/4" iodoform:  3cm  Post-procedure details:     Patient tolerance of procedure:   Tolerated well, no immediate complications           Phone Contacts  ED Phone Contact    ED Course  ED Course as of May 01 1650   Tue May 01, 2018   0725 Patient with increased respiratory distress and work of breathing after u/s will check abg and adminster bipap O2 NC at 6 % turned down to 2 LPM    0822 Case reveiwed with Dr Karo Dutton recommends admit to ICU      4604 Improving on bipap Dr Karo Dutton has ordered repeat ABG                                MDM  Number of Diagnoses or Management Options  Diagnosis management comments: mdm: pneumonia, copd exaceration, PE, scrotoal abcess    The patient presented with a condition in which there was a high probability of imminent or life-threatening deterioration, and critical care services (excluding separately billable procedures) totalled  minutes          Disposition  Final diagnoses:   Acute on chronic respiratory failure with hypoxia and hypercapnia (HCC)   Abscess of scrotal wall - left     Time reflects when diagnosis was documented in both MDM as applicable and the Disposition within this note     Time User Action Codes Description Comment    5/1/2018  8:13 AM Hitesh Curl Add [N49 2] Scrotal abscess     5/1/2018  8:13 AM [place] Curl Modify [N49 2] Scrotal abscess     5/1/2018  8:13 AM Tere Vega Modify [N49 2] Scrotal abscess     5/1/2018  8:26 AM Victoria Sow Jerome Add [J96 21,  J96 22] Acute on chronic respiratory failure with hypoxia and hypercapnia ([facility] [address] )     5/1/2018  8:26 AM Gwenith Police Add [N49 2] Abscess of scrotal wall     5/1/2018  8:26 AM Gwenith Police Modify [N49 2] Abscess of scrotal wall left    5/1/2018 10:31 AM Christiana Schroeder Add [A41 9] Sepsis ([facility] [address] )     5/1/2018  3:30 PM Christiana Schroeder Add [I95 9] Hypotension       ED Disposition     ED Disposition Condition Comment    Admit  Case was discussed with Dr Rupa Floyd and the patient's admission status was agreed to be Admission Status: inpatient status to the service of Dr Emmanuel John    None       Current Discharge Medication List      START taking these medications    Details   Disposable Gloves (NITRILE EXAM GLOVES LARGE) MISC USE AS DIRECTED DAILY  Qty: 400 each, Refills: 0    Associated Diagnoses: Urinary incontinence, unspecified type      Incontinence Supplies MISC Use as directed  Qty: 12 each, Refills: 0    Comments: Reusable cloth pads  Associated Diagnoses: Urinary incontinence, unspecified type      !! Incontinence Supply Disposable (ADHERES INCONTINENCE PAD) MISC USE AS DIRECTED DAILY  Qty: 180 each, Refills: 0    Associated Diagnoses: Urinary incontinence, unspecified type       !! - Potential duplicate medications found  Please discuss with provider  CONTINUE these medications which have NOT CHANGED    Details   cyanocobalamin 250 MCG tablet Take 1 tablet by mouth daily  Qty: 30 tablet, Refills: 0      dextromethorphan-guaiFENesin (ROBITUSSIN DM)  mg/5 mL syrup Take 10 mL by mouth every 4 (four) hours as needed for cough  Qty: 118 mL, Refills: 1      ergocalciferol (VITAMIN D2) 50,000 units Take 1 capsule (50,000 Units total) by mouth once a week  Qty: 7 capsule, Refills: 0    Associated Diagnoses: Vitamin D deficiency      folic acid (FOLVITE) 1 mg tablet Take 1 tablet by mouth daily  Qty: 30 tablet, Refills: 3      !! Incontinence Supply Disposable ([employer] CLEANSING WIPES) MISC [employer] Cleansing Wipes Miscellaneous use as directed  Quantity: 1;  Refills: 5    Mary Carmen Washington;  Started 12-Nov-2015 [id number] Miscellaneous Package (3 Packages)      !! Incontinence Supply Disposable (DEPEND GUARDS FOR MEN) MISC Depend Guards for Men Miscellaneous use as directed  Quantity: 1;  Refills: 5    Saba HITCHCOCK;  Started 12-Nov-2015 [id number] Miscellaneous Package (2 Packages)      !! Incontinence Supply Disposable (DEPEND UNDERGARMENTS) MISC Depend Undergarments Miscellaneous use as directed  Quantity: 100;  Refills: 5    Mary Carmen Washington;  Started 12-Nov-2015 Active      !! Ipratropium-Albuterol (COMBIVENT RESPIMAT)  MCG/ACT AERS Combivent Respimat  MCG/ACT Inhalation Aerosol Solution INHALE 1 PUFF 4 TIMES DAILY (MAXIMUM OF 6 PUFFS IN 24 HOURS)  Quantity: 1;  Refills: 3    Saba HITCHCOCK;  Active      !! ipratropium-albuterol (COMBIVENT RESPIMAT) inhaler Inhale 1 puff every 6 (six) hours      Mometasone Furo-Formoterol Fum (DULERA) 200-5 MCG/ACT AERO Dulera 200-5 MCG/ACT Inhalation Aerosol INHALE 2 PUFFS TWICE DAILY  RINSE MOUTH AFTER USE  Quantity: 0;  Refills: 0   , M D ; Active      predniSONE 10 mg tablet 40 mg daily for 3 days, 30 mg daily for 3 days, 20 mg daily 3 days, 10 mg daily for 3 days  Qty: 30 tablet, Refills: 0    Associated Diagnoses: [facility]       ! ! - Potential duplicate medications found  Please discuss with provider  No discharge procedures on file      ED Provider  Electronically Signed by           Fabienne Motley MD  05/01/18 0572

## 2018-05-01 NOTE — CASE MANAGEMENT
Initial Clinical Review    Admission: Date/Time/Statement: 5/1/18 @ 61 23 68     Orders Placed This Encounter   Procedures    Inpatient Admission     Standing Status:   Standing     Number of Occurrences:   1     Order Specific Question:   Admitting Physician     Answer:   Carmen Mckenna     Order Specific Question:   Level of Care     Answer:   Critical Care [15]     Order Specific Question:   Estimated length of stay     Answer:   More than 2 Midnights     Order Specific Question:   Certification     Answer:   I certify that inpatient services are medically necessary for this patient for a duration of greater than two midnights  See H&P and MD Progress Notes for additional information about the patient's course of treatment  ED: Date/Time/Mode of Arrival:   ED Arrival Information     Expected Arrival Acuity Means of Arrival Escorted By Service Admission Type    - 5/1/2018 01:09 Urgent Wheelchair Other General Medicine Urgent    Arrival Complaint    Shortness of Breath          Chief Complaint:   Chief Complaint   Patient presents with    Shortness of Breath     chronic condition, pt stated cant breathe, care taker stated thism is normal for this pt  History of Illness:    Barney Perry is a 74 y o  male who presents with shortness of breath  The patient was noted to be a poor historian the emergency room  He was placed on supplemental oxygen at 6 L via nasal cannula however, unfortunately, he has become confused and required BiPAP for improvement in his respiratory and mental status  On my encounter with the patient he is awake but lethargic, able to provide very limited history  Other than shortness of breath he does not provide other complaints or information  He denies any pain  He is noted to have a scrotal wound, however, he denies any associated pain  He is noted to be febrile on his arrival to the ICU, he is unable to a detail whether not he has been having fevers at home  Attempts to contact patient's primary listed contacts unsuccessful so far  Per ER documentation, patient apparently has baseline poor respiratory status        ED Vital Signs:   ED Triage Vitals   Temperature Pulse Respirations Blood Pressure SpO2   05/01/18 0127 05/01/18 0127 05/01/18 0127 05/01/18 0127 05/01/18 0127   99 6 °F (37 6 °C) (!) 133 20 138/80 96 %      Temp Source Heart Rate Source Patient Position - Orthostatic VS BP Location FiO2 (%)   05/01/18 0127 05/01/18 0127 05/01/18 0127 05/01/18 0127 --   Temporal Monitor Sitting Right arm       Pain Score       05/01/18 0700       No Pain        Wt Readings from Last 1 Encounters:   05/01/18 42 8 kg (94 lb 5 7 oz)       Vital Signs (abnormal):    above    Abnormal Labs/Diagnostic Test Results:    ABG  On  Bipap   Ph   7 257     PCO2    63 5     PO2    61 1     CO2    34  Chloride   99  Albumin    3 2  D dimer    515  WBC   14 41  Abs  neutro    12 47  CXR:     Developing pulmonary vascular congestion and mild interstitial edema superimposed upon severe emphysematous changes  Right upper lobe nodule (6 mm)  U/S   Scrotum/testicles: Moderate bilateral complex hydroceles  No intrascrotal abscess or abnormal mass  Ct  Chest:    No pulmonary embolism  2   Stable 5 mm nodule anteriorly in the right upper lobe  3   Severe emphysema  4   9 mm subpleural area of fissural nodule in the left upper lobe   There was fluid in this region on the prior study   Recommend 3 month follow-up  5   Left lower lobe atelectasis which is stable      ED Treatment:   Medication Administration from 05/01/2018 0109 to 05/01/2018 1008       Date/Time Order Dose Route Action Action by Comments     05/01/2018 0231 albuterol inhalation solution 10 mg 10 mg Nebulization Given Yesenia Samuels RN      05/01/2018 0231 ipratropium (ATROVENT) 0 02 % inhalation solution 1 mg 1 mg Nebulization Given Yesenia Samuels RN      05/01/2018 0231 sodium chloride 0 9 % inhalation solution 3 mL 3 mL Nebulization Given Padmaja Samuels RN      05/01/2018 0526 iohexol (OMNIPAQUE) 350 MG/ML injection (SINGLE-DOSE) 85 mL 85 mL Intravenous Given Mo Dinh Dibonifazio      05/01/2018 0600 clindamycin (CLEOCIN) capsule 450 mg 450 mg Oral Given Padmaja Samuels RN      05/01/2018 0729 sodium chloride 0 9 % bolus 500 mL 0 mL Intravenous Stopped Kory Black RN      05/01/2018 0601 sodium chloride 0 9 % bolus 500 mL 500 mL Intravenous [address] Padmaja Samuels RN      05/01/2018 0801 furosemide (LASIX) injection 20 mg 20 mg Intravenous Given Kory Black RN      05/01/2018 0900 methylPREDNISolone sodium succinate (Solu-MEDROL) injection 125 mg 125 mg Intravenous Given Jeanette Mac RN           Past Medical/Surgical History:    Active Ambulatory Problems     Diagnosis Date Noted    Vitamin D deficiency     COPD [facility] exacerbation [facility] [address] )     [facility]eps[facility] ) 05/04/2016    Acute tracheobronchitis 05/04/2016    Tobacco abuse 10/04/2016    Low serum prealbumin 10/05/2016    BMI less than 19,adult 10/06/2016    Severe protein-calorie malnutrition (HCC) 10/06/2016    Hypoalbuminemia 11/06/2017    Dysphagia 11/07/2017    Microscopic hematuria 11/08/2017    Renal cyst, right 11/09/2017    Acute on chronic respiratory failure with hypoxia and hypercapnia (HCC) 03/05/2018    Pulmonary nodule 03/05/2018    Rhinovirus infection 03/07/2018    Intractable abdominal pain 03/09/2018     Resolved Ambulatory Problems     Diagnosis Date Noted    Heart disease     Anemia     Testosterone deficiency 05/04/2016    Pulmonary emphysema (HCC) 05/04/2016    Leukocytosis 05/04/2016    Nicotine dependence 05/04/2016    History of iron deficiency anemia 05/04/2016    History of stroke 05/04/2016    Intractable nausea and vomiting 10/04/2016    Low TSH level 10/05/2016    Chest pain 10/06/2016    Hyperbilirubinemia 11/06/2017    Hypokalemia 11/07/2017    Low serum triiodothyronine (T3) 11/08/2017    Moderate protein-calorie malnutrition ([facility] ) 11/08/2017    Pain of right heel 11/09/2017    Hypophosphatemia 11/09/2017    Abnormal chest x-ray 11/09/2017    Tachycardia 03/05/2018     Past Medical History:   Diagnosis Date    Anemia     Asthma     COPD (chronic obstructive pulmonary disease) (HCC)     Coronary artery disease     Heart disease     Left wrist injury     Low testosterone     Stroke ([facility] )     Vitamin D deficiency        Admitting Diagnosis: SOB (shortness of breath) [R06 02]  Scrotal abscess [N49 2]  Abscess of scrotal wall [N49 2]  Acute on chronic respiratory failure with hypoxia and hypercapnia (HCC) [Q95 86, J96 22]    Age/Sex: 74 y o  male    Assessment/Plan:     Acute on chronic respiratory failure with hypoxia and hypercapnia (HCC)   Assessment & Plan     Likely multifactorial secondary to sepsis as well as acute on chronic severe COPD  Patient was placed on BiPAP with subsequent improvement in respiratory status, repeat ABG pending  Maintain oxygen saturation above 88%  Respiratory protocol, BiPAP at bedside          Sepsis ([place] [address] )   Assessment & Plan     Fever 102, leukocytosis, tachycardia, tachypnea  Unclear source, suspected secondary to scrotal wound, will obtain sepsis workup  Continue empiric treatment with cefepime and vancomycin  Obtain lactic acid level  Received IV fluid boluses, will now place on IV Lasix secondary to evidence pulmonary congestion        COPD with acute exacerbation ([facility])   Assessment & Plan     Continue Solu-Medrol  Placed on respiratory protocol  Monitor ABG, patient was on BiPAP but was tolerating the mask orally, taken of BiPAP oxygen saturation around 88% on 2 L nasal cannula, continue via nasal cannula for now, ABG pending          Acute encephalopathy   Assessment & Plan     Most likely secondary to acute respiratory failure and setting of sepsis  Slightly improved with BiPAP but still lethargic and limited communication  Continue treatment of underlying conditions          Acute on chronic diastolic congestive heart failure (HCC)   Assessment & Plan     With acute respiratory failure  Evidence of vascular congestion on chest x-ray  Will place on Lasix IV and monitor intake and output, daily weights        Scrotal abscess   Assessment & Plan     Scrotal wound with tunneling noted on left lateral aspect of left testicle  Urology consulted  Possible culprit of sepsis  Started on cefepime and vancomycin          Severe protein-calorie malnutrition (HCC)   Assessment & Plan     Malnutrition Findings:       Multiple chronic conditions including COPD, CHF, tobacco abuse        Tobacco abuse   Assessment & Plan      cessation  Will offer nicotine patch      Anticipated Length of Stay:  Patient will be admitted on an Inpatient basis with an anticipated length of stay of more than 2 midnights     Justification for Hospital Stay: Need for IV management, ICU monitoring, diagnostic workup      Admission Orders:   IP    5/1  @    0383  Scheduled Meds:   Current Facility-Administered Medications:  acetaminophen 650 mg Rectal Q6H PRN Amanda Pittman MD    cefepime 2,000 mg Intravenous Q12H Amanda Pittman MD Last Rate: 2,000 mg (05/01/18 1236)   enoxaparin 40 mg Subcutaneous Q24H [facility] Christiana Schroeder MD    fluticasone-salmeterol 1 puff Inhalation Q12H [facility] Christiana Schroeder MD    ipratropium 0 5 mg Nebulization TID Amanda Pittman MD    levalbuterol 1 25 mg Nebulization TID Amanda Pittman MD    levalbuterol 1 25 mg Nebulization Q6H PRN Christiana Schroeder MD    LORazepam 0 5 mg Intravenous Q4H PRN Amanda Pittman MD    methylPREDNISolone sodium succinate 40 mg Intravenous Q8H Meghann Brewer MD    sodium chloride 3 mL Nebulization Q6H PRN Amanda Pittman MD    vancomycin 500 mg Intravenous Q12H Amanda Pittman MD Last Rate: 500 mg (05/01/18 1352)     Continuous Infusions:    PRN Meds:   acetaminophen    levalbuterol    LORazepam    sodium chloride     BiPap  1:1  OBS  Cons ID  Pt/OT  Sputum c/s  Cardiac  Diet  Cons general surgery

## 2018-05-01 NOTE — ASSESSMENT & PLAN NOTE
Malnutrition Findings:       Multiple chronic conditions including COPD, CHF, tobacco abuse    BMI Findings: Body mass index is 15 23 kg/m²       Will obtain nutrition consult

## 2018-05-01 NOTE — ASSESSMENT & PLAN NOTE
Fever 102, leukocytosis, tachycardia, tachypnea, lactic acidosis  Hypotension persists despite fluid boluses  Will obtain PICC line with anticipation of use of pressors  Unclear source, suspected secondary to tracheobronchitis, less likely scrotal wound as wound is superficial  Continue empiric treatment with cefepime and vancomycin  ID consulted, input appreciated

## 2018-05-01 NOTE — ED NOTES
Dr Beena Swanson and Dr Amanda Patel at bedside, change in patient's respiratory and mental status noted at this time  Oxygen turned to 2L, was noted to be at 6L from prior shift   Respiratory made aware of need for bipap and abg     Monica Maldonado RN  05/01/18 9606

## 2018-05-01 NOTE — ED NOTES
incontineent of urine  Cleansed   Linens changed   Transported to ccu bed 208 by erin kemp rn  And rt Sami monk RN  05/01/18 5721

## 2018-05-01 NOTE — ASSESSMENT & PLAN NOTE
Likely multifactorial secondary to sepsis as well as acute on chronic severe COPD  Patient was placed on BiPAP with subsequent improvement in respiratory status, repeat ABG pending  Maintain oxygen saturation above 88%  Respiratory protocol, BiPAP at bedside

## 2018-05-01 NOTE — RESPIRATORY THERAPY NOTE
Placed pt  On 2 l/m at 11:30  Pt  Ripped BiPap mask off and became combative refusing to reapply  Obtained ABG at approx 10 min  After coming off BiPap  Pt  Sat was 88/89% with finger probe

## 2018-05-01 NOTE — RESPIRATORY THERAPY NOTE
As per verbal from Dr Jono Iglesias  Patient to be kept on 3 l/m Sat 93% at this time  He stated he would try the BiPap mask later

## 2018-05-01 NOTE — RESPIRATORY THERAPY NOTE
RT Protocol Note  Emmanuel Cleveland 74 y o  male MRN: [id number]  Unit/Bed#:  Encounter: [id number]    Assessment    Active Problems:    COPD with acute exacerbation (HCC)    Sepsis ([place] [address] )    Tobacco abuse    Severe protein-calorie malnutrition ([place] [address] )    Acute on chronic respiratory failure with hypoxia and hypercapnia (HCC)    Scrotal abscess    Acute on chronic diastolic congestive heart failure (HCC)    Acute encephalopathy      Home Pulmonary Medications:  Combivent Inhaler  Home Devices/Therapy: Home O2, Other (Comment)    Past Medical History:   Diagnosis Date    Anemia     Asthma     COPD (chronic obstructive pulmonary disease) ([place] [address] )     Coronary artery disease     Heart disease     Left wrist injury     Low testosterone     Stroke ([place] [address] )     Vitamin D deficiency      Social History     Social History    Marital status: Single     Spouse name: N/A    Number of children: N/A    Years of education: N/A     Social History Main Topics    Smoking status: Heavy Tobacco Smoker     Packs/day: 1 00     Years: 59 00     Types: Cigarettes    Smokeless tobacco: Never Used    Alcohol use No    Drug use: No    Sexual activity: Not Currently     Other Topics Concern    None     Social History Narrative    None       Subjective         Objective    Physical Exam:   Assessment Type: Pre-treatment  General Appearance: Sleeping  Respiratory Pattern: Dyspnea with exertion  Chest Assessment: Chest expansion symmetrical  Bilateral Breath Sounds: Crackles (left base)  Cough: Weak, Non-productive  O2 Device: BiPap at this time    Vitals:  Blood pressure 99/70, pulse (!) 120, temperature (!) 101 2 °F (38 4 °C), temperature source Temporal, resp  rate 22, height 5' 6" (1 676 m), weight 42 8 kg (94 lb 5 7 oz), SpO2 (!) 89 %        Results from last 7 days  Lab Units 05/01/18  1140   PH ART  7 314*   PCO2 ART mm Hg 59 1*   PO2 ART mm Hg 78 8   HCO3 ART mmol/L 29 4*   BASE EXC ART mmol/L 1 6   O2 CONTENT ART mL/dL 20 3   O2 HGB, ARTERIAL % 93 9*   ABG SOURCE  Radial, Right   WICHO TEST  Yes       Imaging and other studies: I have personally reviewed pertinent reports  O2 Device: BiPap at this time     Plan  Respiratory/Home bronchodilator patient  Xopenex 1 25 mg  With 0 5 mg  Atrovent TID  Xopenex 1 25 mg   With 0 9% Sodium Chloride Q6PRN for shortness of breath and wheeze

## 2018-05-01 NOTE — ASSESSMENT & PLAN NOTE
With acute respiratory failure  Evidence of vascular congestion on chest x-ray  Will place on Lasix IV and monitor intake and output, daily weights

## 2018-05-01 NOTE — ASSESSMENT & PLAN NOTE
Most likely secondary to acute respiratory failure and setting of sepsis  Slightly improved with BiPAP but still lethargic and limited communication  Continue treatment of underlying conditions

## 2018-05-01 NOTE — ED NOTES
Patient transported to ct via [id number] [facility] MARIBELL Samuels  05/01/18 8864

## 2018-05-01 NOTE — H&P
H&P- Isaac Prudent 1945, 74 y o  male MRN: [id number]    Unit/Bed#:  Encounter: [id number]    Primary Care Provider: Doreen Baltazar PA-C   Date and time admitted to hospital: 5/1/2018  1:16 AM        Acute on chronic respiratory failure with hypoxia and hypercapnia (HCC)   Assessment & Plan    Likely multifactorial secondary to sepsis as well as acute on chronic severe COPD  Patient was placed on BiPAP with subsequent improvement in respiratory status, repeat ABG pending  Maintain oxygen saturation above 88%  Respiratory protocol, BiPAP at bedside        Sepsis [facility])   Assessment & Plan    Fever 102, leukocytosis, tachycardia, tachypnea  Unclear source, suspected secondary to scrotal wound, will obtain sepsis workup  Continue empiric treatment with cefepime and vancomycin  Obtain lactic acid level  Received IV fluid boluses, will now place on IV Lasix secondary to evidence pulmonary congestion        COPD with acute exacerbation (HCC)   Assessment & Plan    Continue Solu-Medrol  Placed on respiratory protocol  Monitor ABG, patient was on BiPAP but was tolerating the mask orally, taken of BiPAP oxygen saturation around 88% on 2 L nasal cannula, continue via nasal cannula for now, ABG pending        Acute encephalopathy   Assessment & Plan    Most likely secondary to acute respiratory failure and setting of sepsis  Slightly improved with BiPAP but still lethargic and limited communication  Continue treatment of underlying conditions        Acute on chronic diastolic congestive heart failure ([address] )   Assessment & Plan    With acute respiratory failure  Evidence of vascular congestion on chest x-ray  Will place on Lasix IV and monitor intake and output, daily weights        Scrotal abscess   Assessment & Plan    Scrotal wound with tunneling noted on left lateral aspect of left testicle  Urology consulted  Possible culprit of sepsis  Started on cefepime and vancomycin        Severe protein-calorie malnutrition ([facility] )   Assessment & Plan    Malnutrition Findings:       Multiple chronic conditions including COPD, CHF, tobacco abuse    BMI Findings: Body mass index is 15 23 kg/m²  Will obtain nutrition consult        Tobacco abuse   Assessment & Plan     cessation  Will offer nicotine patch          VTE Prophylaxis: Enoxaparin (Lovenox)  / sequential compression device   Code Status: Full  POLST: POLST form is not discussed and not completed at this time  Discussion with family: Left message for primary contact    Anticipated Length of Stay:  Patient will be admitted on an Inpatient basis with an anticipated length of stay of more than 2 midnights  Justification for Hospital Stay: Need for IV management, ICU monitoring, diagnostic workup    Total Time for Visit, including Counseling / Coordination of Care: 1 hour  Greater than 50% of this total time spent on direct patient counseling and coordination of care  Chief Complaint:   SOB    History of Present Illness:    Barney Perry is a 74 y o  male who presents with shortness of breath  The patient was noted to be a poor historian the emergency room  He was placed on supplemental oxygen at 6 L via nasal cannula however, unfortunately, he has become confused and required BiPAP for improvement in his respiratory and mental status  On my encounter with the patient he is awake but lethargic, able to provide very limited history  Other than shortness of breath he does not provide other complaints or information  He denies any pain  He is noted to have a scrotal wound, however, he denies any associated pain  He is noted to be febrile on his arrival to the ICU, he is unable to a detail whether not he has been having fevers at home  Attempts to contact patient's primary listed contacts unsuccessful so far  Per ER documentation, patient apparently has baseline poor respiratory status      Review of Systems:    Review of Systems   Unable to perform ROS: Mental status change       Past Medical and Surgical History:     Past Medical History:   Diagnosis Date    Anemia     Asthma     COPD [facility]chronic obstructive pulmonary disease) [facility] )     Coronary artery disease     Heart disease     Left wrist injury     Low testosterone     Stroke ([facility] )     Vitamin D deficiency        History reviewed  No pertinent surgical history  Meds/Allergies:    Prior to Admission medications    Medication Sig Start Date End Date Taking?  Authorizing Provider   cyanocobalamin 250 MCG tablet Take 1 tablet by mouth daily 11/12/17   Wilfrid Caro MD   dextromethorphan-guaiFENesin (ROBITUSSIN DM)  mg/5 mL syrup Take 10 mL by mouth every 4 (four) hours as needed for cough 10/7/16   [employer], DO   ergocalciferol (VITAMIN D2) 50,000 units Take 1 capsule (50,000 Units total) by mouth once a week 3/17/18   Jimenez Campa, DO   folic acid (FOLVITE) 1 mg tablet Take 1 tablet by mouth daily 11/12/17   Wilfrid Caro MD   Incontinence Supply Disposable ([employer] CLEANS[employer] WIPES) MISC [employer] Cleansing Wipes Miscellaneous use as directed  Quantity: 1;  Refills: 5    Maribell Lewisburg CRNP;  Started 12-Nov-2015 [id number] Miscellaneous Package (3 Packages) 11/12/15   Historical MD Rj   Incontinence Supply Disposable (DEPEND GUARDS FOR MEN) MISC Depend Guards for Men Miscellaneous use as directed  Quantity: 1;  Refills: 5    Campti Richard CRNP;  Started 12-Nov-2015 [id number] Miscellaneous Package (2 Packages) 11/12/15   Historical MD Rj   Incontinence Supply Disposable (DEPEND UNDERGARMENTS) MISC Depend Undergarments Miscellaneous use as directed  Quantity: 100;  Refills: 5    Campti Lewisburg CRNP;  Started 12-Nov-2015 Active 11/12/15   Historical MD Rj   Ipratropium-Albuterol (COMBIVENT RESPIMAT)  MCG/ACT AERS Combivent Respimat  MCG/ACT Inhalation Aerosol Solution INHALE 1 PUFF 4 TIMES DAILY (MAXIMUM OF 6 PUFFS IN 24 HOURS)  Quantity: 1;  Refills: 3 Treasure Mortensen; Active    Historical Provider, MD   ipratropium-albuterol (COMBIVENT RESPIMAT) inhaler Inhale 1 puff every 6 (six) hours    Historical Provider, MD   Mometasone Furo-Formoterol Fum (DULERA) 200-5 MCG/ACT AERO Dulera 200-5 MCG/ACT Inhalation Aerosol INHALE 2 PUFFS TWICE DAILY  RINSE MOUTH AFTER USE  Quantity: 0;  Refills: 0   , M D ; Active    Historical Provider, MD   predniSONE 10 mg tablet 40 mg daily for 3 days, 30 mg daily for 3 days, 20 mg daily 3 days, 10 mg daily for 3 days  3/10/18   Kaitlynn Clayton,      I have reviewed home medications with a medical source (PCP, Pharmacy, other)  Allergies: No Known Allergies    Social History:     Marital Status: Single   Occupation: Retired  Patient Pre-hospital Living Situation: Lives in group home   Patient Pre-hospital Level of Mobility: Limited  Patient Pre-hospital Diet Restrictions: Unknown  Substance Use History:   History   Alcohol Use No     History   Smoking Status    Heavy Tobacco Smoker    Packs/day: 1 00    Years: 59 00    Types: Cigarettes   Smokeless Tobacco    Never Used     History   Drug Use No       Family History:    non-contributory    Physical Exam:     Vitals:   Blood Pressure: (!) 89/63 (05/01/18 1008)  Pulse: (!) 134 (05/01/18 1008)  Temperature: (!) 102 1 °F (38 9 °C) (05/01/18 1008)  Temp Source: Temporal (05/01/18 1008)  Respirations: (!) 35 (assisted with bipap) (05/01/18 1008)  Height: 5' 6" (167 6 cm) (05/01/18 1008)  Weight - Scale: 42 8 kg (94 lb 5 7 oz) (05/01/18 1034)  SpO2: (!) 89 % (05/01/18 1130)    Physical Exam   Constitutional: He appears lethargic  He appears cachectic  HENT:   Head: Normocephalic and atraumatic  Eyes: Conjunctivae are normal    Neck: No JVD present  Cardiovascular: Normal rate and regular rhythm  No murmur heard  Pulmonary/Chest: He is in respiratory distress  He has no wheezes  He has rales  Abdominal: Soft  He exhibits no distension  There is no tenderness   There is no guarding  Musculoskeletal: He exhibits no edema  Neurological: He appears lethargic  Skin: Skin is warm and dry  Psychiatric: He has a normal mood and affect  Additional Data:     Lab Results: I have personally reviewed pertinent reports  Results from last 7 days  Lab Units 05/01/18  0227   WBC Thousand/uL 14 41*   HEMOGLOBIN g/dL 15 9   HEMATOCRIT % 48 6   PLATELETS Thousands/uL 243   NEUTROS PCT % 86*   LYMPHS PCT % 4*   MONOS PCT % 10   EOS PCT % 0       Results from last 7 days  Lab Units 05/01/18 0227   SODIUM mmol/L 138   POTASSIUM mmol/L 4 2   CHLORIDE mmol/L 99*   CO2 mmol/L 34*   BUN mg/dL 18   CREATININE mg/dL 1 01   CALCIUM mg/dL 9 3   TOTAL PROTEIN g/dL 8 0   BILIRUBIN TOTAL mg/dL 0 60   ALK PHOS U/L 85   ALT U/L 19   AST U/L 21   GLUCOSE RANDOM mg/dL 127                   Imaging: I have personally reviewed pertinent reports  XR chest 1 view portable   ED Interpretation by Sha Bueno MD (04/69 1991)   Read by me; Radiologist to provide formal interpretation  Increased pulmonary vasculature vs  previous      Final Result by ANU Josue MD (05/01 6882)      Developing pulmonary vascular congestion and mild interstitial edema superimposed upon severe emphysematous changes  Right upper lobe nodule (6 mm)  Workstation performed: [id number]         XR chest 1 view portable   ED Interpretation by Sha Bueno MD (63/23 2861)   Read by me; Radiologist to provide formal interpretation  Slight increased marking rt base vs  3/5/18      Final Result by Shen Brown MD (05/01 7996)      Severe pulmonary emphysema  No acute pulmonary disease            Workstation performed: [id number]         US scrotum and testicles   Final Result by Med Chin MD (05/01 1039)       Normal Doppler flow to both testicles  Moderate bilateral complex hydroceles  No intrascrotal abscess or abnormal mass              Workstation performed: [id number] CTA ED chest PE study   Final Result by Alexia García MD (05/01 0710)         1  No pulmonary embolism  2   Stable 5 mm nodule anteriorly in the right upper lobe  3   Severe emphysema  4   9 mm subpleural area of fissural nodule in the left upper lobe  There was fluid in this region on the prior study  Recommend 3 month follow-up  5   Left lower lobe atelectasis which is stable  Workstation performed: [id number]             EKG, Pathology, and Other Studies Reviewed on Admission:   · EKG: Reviewed, sinus tachycardia    Allscripts / Epic Records Reviewed: Yes     ** Please Note: This note has been constructed using a voice recognition system   **

## 2018-05-01 NOTE — ASSESSMENT & PLAN NOTE
Scrotal wound with tunneling noted on left lateral aspect of left testicle  Urology consulted  Possible culprit of sepsis  Started on cefepime and vancomycin

## 2018-05-01 NOTE — PHYSICAL THERAPY NOTE
PT NOTE:        Order received and chart review performed  Pt is on BiPap currently and not appropriate for PT evaluation  Will reattempt tomorrow as able

## 2018-05-01 NOTE — PROGRESS NOTES
The patient is unable to follow direction and continues to pull the BiPAP mask off  We will apply bilateral upper extremity soft limb restraints to the patient

## 2018-05-02 ENCOUNTER — APPOINTMENT (INPATIENT)
Dept: NON INVASIVE DIAGNOSTICS | Facility: HOSPITAL | Age: 73
DRG: 871 | End: 2018-05-02
Payer: MEDICARE

## 2018-05-02 ENCOUNTER — APPOINTMENT (INPATIENT)
Dept: RADIOLOGY | Facility: HOSPITAL | Age: 73
DRG: 871 | End: 2018-05-02
Payer: MEDICARE

## 2018-05-02 PROBLEM — I21.4 NSTEMI (NON-ST ELEVATED MYOCARDIAL INFARCTION) (HCC): Status: ACTIVE | Noted: 2018-05-02

## 2018-05-02 LAB
ALBUMIN SERPL BCP-MCNC: 2.2 G/DL (ref 3.5–5)
ALP SERPL-CCNC: 56 U/L (ref 46–116)
ALT SERPL W P-5'-P-CCNC: 15 U/L (ref 12–78)
ANION GAP SERPL CALCULATED.3IONS-SCNC: 5 MMOL/L (ref 4–13)
APTT PPP: 32 SECONDS (ref 23–35)
APTT PPP: 50 SECONDS (ref 23–35)
APTT PPP: 51 SECONDS (ref 23–35)
ARTERIAL PATENCY WRIST A: YES
AST SERPL W P-5'-P-CCNC: 27 U/L (ref 5–45)
ATRIAL RATE: 90 BPM
BACTERIA UR CULT: NORMAL
BASE EXCESS BLDA CALC-SCNC: -0.4 MMOL/L
BASOPHILS # BLD MANUAL: 0 THOUSAND/UL (ref 0–0.1)
BASOPHILS NFR MAR MANUAL: 0 % (ref 0–1)
BILIRUB SERPL-MCNC: 0.5 MG/DL (ref 0.2–1)
BODY TEMPERATURE: 101.1 DEGREES FEHRENHEIT
BUN SERPL-MCNC: 23 MG/DL (ref 5–25)
CALCIUM SERPL-MCNC: 8.7 MG/DL (ref 8.3–10.1)
CHLORIDE SERPL-SCNC: 104 MMOL/L (ref 100–108)
CO2 SERPL-SCNC: 30 MMOL/L (ref 21–32)
CREAT SERPL-MCNC: 0.78 MG/DL (ref 0.6–1.3)
EOSINOPHIL # BLD MANUAL: 0 THOUSAND/UL (ref 0–0.4)
EOSINOPHIL NFR BLD MANUAL: 0 % (ref 0–6)
ERYTHROCYTE [DISTWIDTH] IN BLOOD BY AUTOMATED COUNT: 13.5 % (ref 11.6–15.1)
FLUAV AG SPEC QL: NORMAL
FLUBV AG SPEC QL: NORMAL
GFR SERPL CREATININE-BSD FRML MDRD: 89 ML/MIN/1.73SQ M
GLUCOSE SERPL-MCNC: 127 MG/DL (ref 65–140)
HCO3 BLDA-SCNC: 24.6 MMOL/L (ref 22–28)
HCT VFR BLD AUTO: 39 % (ref 36.5–49.3)
HGB BLD-MCNC: 12.9 G/DL (ref 12–17)
INR PPP: 1.17 (ref 0.86–1.16)
IPAP: 18
LACTATE SERPL-SCNC: 1.4 MMOL/L (ref 0.5–2)
LYMPHOCYTES # BLD AUTO: 0.55 THOUSAND/UL (ref 0.6–4.47)
LYMPHOCYTES # BLD AUTO: 4 % (ref 14–44)
MAGNESIUM SERPL-MCNC: 1.7 MG/DL (ref 1.6–2.6)
MCH RBC QN AUTO: 31.1 PG (ref 26.8–34.3)
MCHC RBC AUTO-ENTMCNC: 33.1 G/DL (ref 31.4–37.4)
MCV RBC AUTO: 94 FL (ref 82–98)
MONOCYTES # BLD AUTO: 0.55 THOUSAND/UL (ref 0–1.22)
MONOCYTES NFR BLD: 4 % (ref 4–12)
NEUTROPHILS # BLD MANUAL: 12.59 THOUSAND/UL (ref 1.85–7.62)
NEUTS SEG NFR BLD AUTO: 91 % (ref 43–75)
NON VENT- BIPAP: NORMAL
NT-PROBNP SERPL-MCNC: 5316 PG/ML
O2 CT BLDA-SCNC: 17 ML/DL (ref 16–23)
OXYHGB MFR BLDA: 94.8 % (ref 94–97)
P AXIS: 80 DEGREES
PCO2 BLDA: 41.9 MM HG (ref 36–44)
PEEP MAX SETTING VENT: 5 CM[H2O]
PH BLDA: 7.39 [PH] (ref 7.35–7.45)
PHOSPHATE SERPL-MCNC: 3 MG/DL (ref 2.3–4.1)
PLATELET # BLD AUTO: 206 THOUSANDS/UL (ref 149–390)
PLATELET BLD QL SMEAR: ADEQUATE
PMV BLD AUTO: 10.1 FL (ref 8.9–12.7)
PO2 BLDA: 80.5 MM HG (ref 75–129)
POTASSIUM SERPL-SCNC: 4 MMOL/L (ref 3.5–5.3)
PR INTERVAL: 162 MS
PROT SERPL-MCNC: 5.6 G/DL (ref 6.4–8.2)
PROTHROMBIN TIME: 14.8 SECONDS (ref 12.1–14.4)
QRS AXIS: 48 DEGREES
QRSD INTERVAL: 72 MS
QT INTERVAL: 370 MS
QTC INTERVAL: 452 MS
RBC # BLD AUTO: 4.15 MILLION/UL (ref 3.88–5.62)
RSV B RNA SPEC QL NAA+PROBE: NORMAL
SODIUM SERPL-SCNC: 139 MMOL/L (ref 136–145)
SPECIMEN SOURCE: NORMAL
T WAVE AXIS: -44 DEGREES
TOTAL CELLS COUNTED SPEC: 100
TROPONIN I SERPL-MCNC: 2.58 NG/ML
TROPONIN I SERPL-MCNC: 2.66 NG/ML
TROPONIN I SERPL-MCNC: 2.88 NG/ML
VARIANT LYMPHS # BLD AUTO: 1 %
VENT BIPAP FIO2: 28 %
VENTRICULAR RATE: 90 BPM
WBC # BLD AUTO: 13.84 THOUSAND/UL (ref 4.31–10.16)

## 2018-05-02 PROCEDURE — 80053 COMPREHEN METABOLIC PANEL: CPT | Performed by: INTERNAL MEDICINE

## 2018-05-02 PROCEDURE — 99291 CRITICAL CARE FIRST HOUR: CPT | Performed by: FAMILY MEDICINE

## 2018-05-02 PROCEDURE — 99255 IP/OBS CONSLTJ NEW/EST HI 80: CPT | Performed by: INTERNAL MEDICINE

## 2018-05-02 PROCEDURE — 99253 IP/OBS CNSLTJ NEW/EST LOW 45: CPT | Performed by: UROLOGY

## 2018-05-02 PROCEDURE — 84484 ASSAY OF TROPONIN QUANT: CPT | Performed by: FAMILY MEDICINE

## 2018-05-02 PROCEDURE — 93306 TTE W/DOPPLER COMPLETE: CPT

## 2018-05-02 PROCEDURE — 84100 ASSAY OF PHOSPHORUS: CPT | Performed by: INTERNAL MEDICINE

## 2018-05-02 PROCEDURE — 71045 X-RAY EXAM CHEST 1 VIEW: CPT

## 2018-05-02 PROCEDURE — 93010 ELECTROCARDIOGRAM REPORT: CPT | Performed by: INTERNAL MEDICINE

## 2018-05-02 PROCEDURE — 94660 CPAP INITIATION&MGMT: CPT

## 2018-05-02 PROCEDURE — 83880 ASSAY OF NATRIURETIC PEPTIDE: CPT | Performed by: INTERNAL MEDICINE

## 2018-05-02 PROCEDURE — 94640 AIRWAY INHALATION TREATMENT: CPT

## 2018-05-02 PROCEDURE — 82805 BLOOD GASES W/O2 SATURATION: CPT | Performed by: FAMILY MEDICINE

## 2018-05-02 PROCEDURE — 84484 ASSAY OF TROPONIN QUANT: CPT | Performed by: INTERNAL MEDICINE

## 2018-05-02 PROCEDURE — 93306 TTE W/DOPPLER COMPLETE: CPT | Performed by: INTERNAL MEDICINE

## 2018-05-02 PROCEDURE — 36600 WITHDRAWAL OF ARTERIAL BLOOD: CPT

## 2018-05-02 PROCEDURE — 93005 ELECTROCARDIOGRAM TRACING: CPT | Performed by: INTERNAL MEDICINE

## 2018-05-02 PROCEDURE — 94760 N-INVAS EAR/PLS OXIMETRY 1: CPT

## 2018-05-02 PROCEDURE — 85730 THROMBOPLASTIN TIME PARTIAL: CPT | Performed by: INTERNAL MEDICINE

## 2018-05-02 PROCEDURE — 85730 THROMBOPLASTIN TIME PARTIAL: CPT | Performed by: FAMILY MEDICINE

## 2018-05-02 PROCEDURE — 85007 BL SMEAR W/DIFF WBC COUNT: CPT | Performed by: INTERNAL MEDICINE

## 2018-05-02 PROCEDURE — 85610 PROTHROMBIN TIME: CPT | Performed by: FAMILY MEDICINE

## 2018-05-02 PROCEDURE — 83735 ASSAY OF MAGNESIUM: CPT | Performed by: INTERNAL MEDICINE

## 2018-05-02 PROCEDURE — 85027 COMPLETE CBC AUTOMATED: CPT | Performed by: INTERNAL MEDICINE

## 2018-05-02 PROCEDURE — 83605 ASSAY OF LACTIC ACID: CPT | Performed by: INTERNAL MEDICINE

## 2018-05-02 RX ORDER — NICOTINE 21 MG/24HR
21 PATCH, TRANSDERMAL 24 HOURS TRANSDERMAL DAILY
Status: DISCONTINUED | OUTPATIENT
Start: 2018-05-02 | End: 2018-05-09 | Stop reason: HOSPADM

## 2018-05-02 RX ORDER — ASPIRIN 325 MG
325 TABLET, DELAYED RELEASE (ENTERIC COATED) ORAL DAILY
Status: DISCONTINUED | OUTPATIENT
Start: 2018-05-02 | End: 2018-05-09 | Stop reason: HOSPADM

## 2018-05-02 RX ORDER — HEPARIN SODIUM 1000 [USP'U]/ML
1200 INJECTION, SOLUTION INTRAVENOUS; SUBCUTANEOUS AS NEEDED
Status: DISCONTINUED | OUTPATIENT
Start: 2018-05-02 | End: 2018-05-04

## 2018-05-02 RX ORDER — MORPHINE SULFATE 2 MG/ML
2 INJECTION, SOLUTION INTRAMUSCULAR; INTRAVENOUS
Status: DISCONTINUED | OUTPATIENT
Start: 2018-05-02 | End: 2018-05-09 | Stop reason: HOSPADM

## 2018-05-02 RX ORDER — HEPARIN SODIUM 1000 [USP'U]/ML
2400 INJECTION, SOLUTION INTRAVENOUS; SUBCUTANEOUS AS NEEDED
Status: DISCONTINUED | OUTPATIENT
Start: 2018-05-02 | End: 2018-05-04

## 2018-05-02 RX ORDER — NITROGLYCERIN 0.4 MG/1
0.4 TABLET SUBLINGUAL
Status: DISCONTINUED | OUTPATIENT
Start: 2018-05-02 | End: 2018-05-09 | Stop reason: HOSPADM

## 2018-05-02 RX ORDER — HEPARIN SODIUM 10000 [USP'U]/100ML
3-20 INJECTION, SOLUTION INTRAVENOUS
Status: DISCONTINUED | OUTPATIENT
Start: 2018-05-02 | End: 2018-05-04

## 2018-05-02 RX ORDER — HEPARIN SODIUM 1000 [USP'U]/ML
2400 INJECTION, SOLUTION INTRAVENOUS; SUBCUTANEOUS ONCE
Status: COMPLETED | OUTPATIENT
Start: 2018-05-02 | End: 2018-05-02

## 2018-05-02 RX ADMIN — METHYLPREDNISOLONE SODIUM SUCCINATE 40 MG: 40 INJECTION, POWDER, FOR SOLUTION INTRAMUSCULAR; INTRAVENOUS at 05:09

## 2018-05-02 RX ADMIN — CEFEPIME HYDROCHLORIDE 2000 MG: 2 INJECTION, SOLUTION INTRAVENOUS at 12:13

## 2018-05-02 RX ADMIN — NOREPINEPHRINE BITARTRATE 5 MCG/MIN: 1 INJECTION INTRAVENOUS at 15:37

## 2018-05-02 RX ADMIN — LEVALBUTEROL 1.25 MG: 1.25 SOLUTION, CONCENTRATE RESPIRATORY (INHALATION) at 13:38

## 2018-05-02 RX ADMIN — HEPARIN SODIUM 1200 UNITS: 1000 INJECTION, SOLUTION INTRAVENOUS; SUBCUTANEOUS at 13:31

## 2018-05-02 RX ADMIN — FLUTICASONE PROPIONATE AND SALMETEROL 1 PUFF: 50; 250 POWDER RESPIRATORY (INHALATION) at 21:50

## 2018-05-02 RX ADMIN — CEFEPIME HYDROCHLORIDE 2000 MG: 2 INJECTION, SOLUTION INTRAVENOUS at 23:47

## 2018-05-02 RX ADMIN — HEPARIN SODIUM 2400 UNITS: 1000 INJECTION, SOLUTION INTRAVENOUS; SUBCUTANEOUS at 06:42

## 2018-05-02 RX ADMIN — LEVALBUTEROL 1.25 MG: 1.25 SOLUTION, CONCENTRATE RESPIRATORY (INHALATION) at 20:15

## 2018-05-02 RX ADMIN — SODIUM CHLORIDE 250 ML: 0.9 INJECTION, SOLUTION INTRAVENOUS at 05:07

## 2018-05-02 RX ADMIN — IPRATROPIUM BROMIDE 0.5 MG: 0.5 SOLUTION RESPIRATORY (INHALATION) at 08:05

## 2018-05-02 RX ADMIN — LEVALBUTEROL 1.25 MG: 1.25 SOLUTION, CONCENTRATE RESPIRATORY (INHALATION) at 08:05

## 2018-05-02 RX ADMIN — LORAZEPAM 0.5 MG: 2 INJECTION INTRAMUSCULAR; INTRAVENOUS at 00:36

## 2018-05-02 RX ADMIN — HEPARIN SODIUM 1200 UNITS: 1000 INJECTION, SOLUTION INTRAVENOUS; SUBCUTANEOUS at 20:38

## 2018-05-02 RX ADMIN — VANCOMYCIN HYDROCHLORIDE 500 MG: 500 INJECTION, POWDER, LYOPHILIZED, FOR SOLUTION INTRAVENOUS at 13:31

## 2018-05-02 RX ADMIN — HEPARIN SODIUM AND DEXTROSE 12 UNITS/KG/HR: 10000; 5 INJECTION INTRAVENOUS at 06:42

## 2018-05-02 RX ADMIN — METHYLPREDNISOLONE SODIUM SUCCINATE 40 MG: 40 INJECTION, POWDER, FOR SOLUTION INTRAMUSCULAR; INTRAVENOUS at 13:30

## 2018-05-02 RX ADMIN — ASPIRIN 325 MG: 325 TABLET, DELAYED RELEASE ORAL at 09:35

## 2018-05-02 RX ADMIN — METHYLPREDNISOLONE SODIUM SUCCINATE 40 MG: 40 INJECTION, POWDER, FOR SOLUTION INTRAMUSCULAR; INTRAVENOUS at 21:50

## 2018-05-02 RX ADMIN — IPRATROPIUM BROMIDE 0.5 MG: 0.5 SOLUTION RESPIRATORY (INHALATION) at 13:38

## 2018-05-02 RX ADMIN — NICOTINE 21 MG: 21 PATCH, EXTENDED RELEASE TRANSDERMAL at 15:32

## 2018-05-02 RX ADMIN — NOREPINEPHRINE BITARTRATE 6 MCG/MIN: 1 INJECTION INTRAVENOUS at 20:40

## 2018-05-02 RX ADMIN — IPRATROPIUM BROMIDE 0.5 MG: 0.5 SOLUTION RESPIRATORY (INHALATION) at 20:15

## 2018-05-02 RX ADMIN — SODIUM CHLORIDE 75 ML/HR: 0.9 INJECTION, SOLUTION INTRAVENOUS at 16:39

## 2018-05-02 RX ADMIN — FLUTICASONE PROPIONATE AND SALMETEROL 1 PUFF: 50; 250 POWDER RESPIRATORY (INHALATION) at 09:32

## 2018-05-02 NOTE — SEPSIS NOTE
Sepsis Note   Rosy Chadwick 74 y o  male MRN: [id number]  Unit/Bed#:  Encounter: [id number]            Initial Sepsis Screening     Row Name 05/01/18 1130                Is the patient's history suggestive of a new or worsening infection?         Suspected source of infection          Are two or more of the following signs & symptoms of infection both present and new to the patient?         Indicate SIRS criteria          If the answer is yes to both questions, suspicion of sepsis is present          If severe sepsis is present AND tissue hypoperfusion perists in the hour after fluid resuscitation or lactate > 4, the patient meets criteria for SEPTIC SHOCK          Are any of the following organ dysfunction criteria present within 6 hours of suspected infection and SIRS criteria that are NOT considered to be chronic conditions?         Organ dysfunction SBP < 90 mmHg; Lactate > 2 0 mmol/L;Acute respiratory failure (new need for invasive or non-invasive mechanical ventilation)  -PL        Date of presentation of severe sepsis 05/01/18  -PL        Time of presentation of severe sepsis 1130  -PL        Tissue hypoperfusion persists in the hour after crystalloid fluid administration, evidenced, by either: SBP < 90 mm/Hg ( ___ mm/Hg in comment field)  -PL        Was hypotension present within one hour of the conclusion of crystalloid fluid administration?  Yes  -PL        Date of presentation of septic shock          Time of presentation of septic shock            User Key  (r) = Recorded By, (t) = Taken By, (c) = Cosigned By    [address] Name Provider Nicole Rangel MD Physician               Default Flowsheet Data (last 720 hours)      Sepsis Reassessment     Row Name 05/02/18 1006                   Volume Status and Tissue Perfusion Post Fluid Resuscitation- Must Document ALL of the Following:    Vital Signs Reviewed Yes  -PL        Cardio (!)  Tachycardia  -PL        Pulmonary (!) Tachypnea  -PL        Capillary Refill Sluggish  -PL        Peripheral Pulses Radial  -PL        Peripheral Pulse +2  -PL        Skin Cool  -PL           *OR*   Intensive Monitoring- Must Document Two * of the Following Four *:    Vital Signs Reviewed          * Central Venous Pressure (CVP or RAP)          * Central Venous Oxygen (SVO2, ScvO2 or Oxygen saturation via central catheter)          * Bedside Cardiovascular US in IVC diameter and % collapse          * Passive Leg Raise OR Crystalloid Challenge            User Key  (r) = Recorded By, (t) = Taken By, (c) = Cosigned By    Initials Name Provider Type    PL Noy Cristobal MD Physician

## 2018-05-02 NOTE — NURSING NOTE
Patient given ativan 0 5mg IV as ordered & bipap reapplied  Patient currently being cooperative  Dr Braydon Blackburn aware

## 2018-05-02 NOTE — ASSESSMENT & PLAN NOTE
Likely multifactorial secondary to sepsis as well as acute on chronic severe COPD  Continue bipap as tolerated   Maintain oxygen saturation above 88%  Respiratory protocol, BiPAP at bedside  Consulted Pulmonology/Critical Care

## 2018-05-02 NOTE — ASSESSMENT & PLAN NOTE
Continue Solu-Medrol  Placed on respiratory protocol  Continue bipap as tolerated  Pulmonology consulted

## 2018-05-02 NOTE — NURSING NOTE
Pt was able to pull off bipap & became verbally abusive & refuses to wear it  Pt placed on nasal cannula @ 2lpm with SPO2 @ 94%   Dr Birdie Cuba made aware & came to see patient  Patient remains to verbally abusive & cursing at staff  Resp therapist @ bedside & changing mask of bipap to help facilitate patient wearing mask

## 2018-05-02 NOTE — CONSULTS
UROLOGY CONSULTATION NOTE     Patient Identifiers: Alexander Longoria (MRN [id number])  Service Requesting Consultation: SLIM  Service Providing Consultation:  Urology, Alf Turner PA-C    Date of Service: 5/2/2018  Inpatient consult to Urology  Consult performed by: Estefani Meza ordered by: Daniella Beaver        Reason for Consultation: scrotal abscess    ASSESSMENT:     74 y o  old male with a ventral left scrotal wound  PLAN:     - minimal tracking, no fluctuance, and no crepitus requiring urgent urologic intervention at this time   - not deep enough to pack, please provide local wound care  - continued broad spectrum antibiotics  - urology signing off, please monitor wound and notify with worsening     History of Present Illness:     Alexander Longoria is a 74 y o  comorbid male with sepsis  Urology has been consulted due to a scrotal abscess  An ultrasound was obtained and does not reveal an abscess but rather bilateral complex hydroceles  Patient started on cefepime and vancomycin  WBC 14 41 to 13 84  Past Medical, Past Surgical History:     Past Medical History:   Diagnosis Date    Anemia     Asthma     [facility]     Coronary artery disease     Heart disease     Left wrist injury     Low testosterone     Stroke ([facility] )     Vitamin D deficiency    :    History reviewed   No pertinent surgical history :    Medications, Allergies:     Current Facility-Administered Medications   Medication Dose Route Frequency    acetaminophen (TYLENOL) rectal suppository 650 mg  650 mg Rectal Q6H PRN    aspirin (ECOTRIN) EC tablet 325 mg  325 mg Oral Daily    cefepime (MAXIPIME) IVPB (premix) 2,000 mg  2,000 mg Intravenous Q12H    fluticasone-salmeterol (ADVAIR) 250-50 mcg/dose inhaler 1 puff  1 puff Inhalation Q12H [address]    heparin (porcine) 25,000 units in 250 mL infusion (premix)  3-20 Units/kg/hr (Order-Specific) Intravenous Titrated    heparin (porcine) injection 1,200 Units  1,200 Units Intravenous PRN    heparin (porcine) injection 2,400 Units  2,400 Units Intravenous PRN    ipratropium (ATROVENT) 0 02 % inhalation solution 0 5 mg  0 5 mg Nebulization TID    levalbuterol (XOPENEX) inhalation solution 1 25 mg  1 25 mg Nebulization TID    levalbuterol (XOPENEX) inhalation solution 1 25 mg  1 25 mg Nebulization Q6H PRN    LORazepam (ATIVAN) 2 mg/mL injection 0 5 mg  0 5 mg Intravenous Q4H PRN    methylPREDNISolone sodium succinate (Solu-MEDROL) injection 40 mg  40 mg Intravenous Q8H [address]    morphine injection 2 mg  2 mg Intravenous Q3H PRN    nitroglycerin (NITROSTAT) SL tablet 0 4 mg  0 4 mg Sublingual Q5 Min PRN    sodium chloride 0 9 % infusion  75 mL/hr Intravenous Continuous    sodium chloride 0 9 % inhalation solution 3 mL  3 mL Nebulization Q6H PRN    vancomycin (VANCOCIN) 500 mg in sodium chloride 0 9 % 100 mL IVPB  500 mg Intravenous Q12H       Allergies:  No Known Allergies:    Social and Family History:   Social History:   Social History   Substance Use Topics    Smoking status: Heavy Tobacco Smoker     Packs/day: 1 00     Years: 59 00     Types: Cigarettes    Smokeless tobacco: Never Used    Alcohol use No        History   Smoking Status    Heavy Tobacco Smoker    Packs/day: 1 00    Years: 59 00    Types: Cigarettes   Smokeless Tobacco    Never Used       Family History:  History reviewed  No pertinent family history :     Review of Systems:     Unable to obtain    Physical Exam:   General: Patient is pleasant and in NAD  Awake and alert  BP (!) 80/51   Pulse 104   Temp 98 7 °F (37 1 °C) (Temporal)   Resp (!) 24   Ht 5' 6" (1 676 m)   Wt 44 2 kg (97 lb 7 1 oz)   SpO2 96%   BMI 15 73 kg/m² Temp (24hrs), Av 6 °F (37 6 °C), Min:97 3 °F (36 3 °C), Max:102 1 °F (38 9 °C)  current; Temperature: 98 7 °F (37 1 °C)  I/O last 24 hours: In: 1746 7 [I V :246 7;  IV Piggyback:1500]  Out: 655 [Urine:655]  Skin: warm, dry, intact  Cardiac: Peripheral edema: negative  Pulmonary: Non-labored breathing  Abdomen: Soft, non-tender, non-distended  No surgical scars  No masses, tenderness, hernias noted  Musculoskeletal: AROM with no joint deformity or tenderness  Neurology: patient difficult to arose but arousable     (Male):  Ventral lateral scrotal wound with minimal purulent drainage, no fluctuance, no crepitus, and minimal superficial tracking    LEE: in place draining clear yellow urine  no clots      Labs:     Lab Results   Component Value Date    HGB 12 9 05/02/2018    HCT 39 0 05/02/2018    WBC 13 84 (H) 05/02/2018     05/02/2018   ]    Lab Results   Component Value Date     05/02/2018    K 4 0 05/02/2018     05/02/2018    CO2 30 05/02/2018    BUN 23 05/02/2018    CREATININE 0 78 05/02/2018    CALCIUM 8 7 05/02/2018    GLUCOSE 127 05/02/2018   ]    Imaging:   I personally reviewed the images and report of the following studies, and reviewed them with the patient:    SCROTAL ULTRASOUND     INDICATION:    left scrotal wall abscess eval contents to confirm      COMPARISON: None     TECHNIQUE:   Ultrasound the scrotal contents was performed with a high frequency linear transducer utilizing volumetric sweep imaging as well as standard still image techniques  Imaging performed in longitudinal and transverse orientation  Color and   spectral Doppler evaluation also performed bilaterally      FINDINGS:     TESTES:   Testes are symmetric and normal in size      RIGHT testis = 2 7 x 2 4 x 3 2 cm   Normal contour with homogeneous smooth echotexture  No intratesticular mass lesion or calcifications      LEFT testis = 3 6 x 2 6 x 2 1 cm  Normal contour with homogeneous smooth echotexture  No intratesticular mass lesion or calcifications      Doppler flow within both testes is present and grossly symmetric      EPIDIDYMIDES:   Normal Size  Doppler ultrasound demonstrates normal blood flow    No epididymal lesions      HYDROCELE:  Moderate bilateral hydroceles with septations and punctate debris      VARICOCELE:  None present      SCROTUM:  Scrotal thickness and appearance within normal limits  No evidence for extratesticular mass or hernia demonstrated      IMPRESSION:     Normal Doppler flow to both testicles      Moderate bilateral complex hydroceles      No intrascrotal abscess or abnormal mass  Thank you for allowing me to participate in this patients care  Please do not hesitate to call with any additional questions    Kyle Valle PA-C

## 2018-05-02 NOTE — OCCUPATIONAL THERAPY NOTE
Occupational Therapy         Patient Name: Cleve Oppenheim  [id number] Date: 5/2/2018    Attempted evaluation this AM; per nursing pt's BP is low and he is on continuous bipap; will evaluate as able

## 2018-05-02 NOTE — PROGRESS NOTES
Progress Note - Critical Care   Tonie Newby 74 y o  male MRN: [id number]  Unit/Bed#:  Encounter: [id number]    HPI/24hr events:   Consultation requested by Dr Beatrice Acuna  Telemedicine consultation not able to be performed as the patient is not able to communicate with me directly  I discussed the case in detail with Dr Beatrice Acuna and will provide recommendations based on our discussions today and evaluation of data available to me remotely  Full formal consultation will follow tomorrow  This patient is known to me from office evaluation in this past March  He was referred for emergency evaluation at that time due to hypoxia and significant exacerbation of COPD  He was smoking heavily as well  Assessment/Plan:  1  Sepsis with septic shock  · Source currently unclear  · Elevated procalcitonin at presentation, follow daily  · He has been pancultured, await results of the cultures  · He has significant risk for hospital acquired pathogens and therefore vancomycin and cefepime are appropriate antibiotic coverage pending the results of the cultures  · He has received IV fluid resuscitation  · He may require vasopressors  I agree with the plans to place the PICC line  2  Very severe COPD with chronic hypoxemic respiratory failure   · Solu-Medrol  · Bronchodilators  · Continue BiPAP  3  Acute hypercapnic and hypoxic respiratory failure on chronic hypoxemic respiratory failure  · Continue BiPAP  · Follow arterial blood gas  · He has very poor pulmonary reserve and is high risk for decompensation and requirement for mechanical ventilation  4  Type 2 non ST elevation myocardial infarction secondary to demand ischemia and sepsis  · Trend troponin  · BNP elevation without clear evidence of acute congestive heart failure  5  Agree with plans for echocardiogram, last echocardiogram 2017  6   Chronic systolic and diastolic congestive heart failure with moderate aortic stenosis  · No evidence of acute congestive heart failure currently  · Needs continued fluid resuscitation  · He is at risk for cardiogenic shock due to coronary ischemia  7  Severe protein calorie malnutrition  · If he is intubated, would have low threshold for initiation of tube feeding  8  Ongoing tobacco abuse, heavy utilization with close to 3 packs a day of smoking  · Consider nicotine replacement therapy  · Complete cessation recommended      Marco Antonio Ferreira has a very poor prognosis  He has very advanced lung disease as well as cardiac disease and acute presentation of severe sepsis with shock  I would recommend discussions regarding goals of care, particularly DNR/DNI status      _____________________________________________________________________        Medications:    Current Facility-Administered Medications:  acetaminophen 650 mg Rectal Q6H PRN Corby Meek MD    aspirin 325 mg Oral Daily Cam Meyer MD    cefepime 2,000 mg Intravenous Q12H Corby Meek MD Last Rate: 2,000 mg (05/01/18 9425)   fluticasone-salmeterol 1 puff Inhalation Q12H [address] Christiana Schroeder MD    heparin (porcine) 3-20 Units/kg/hr (Order-Specific) Intravenous Titrated Cam Meyer MD Last Rate: 12 Units/kg/hr (05/02/18 5791)   heparin (porcine) 1,200 Units Intravenous PRN Cam Meyer MD    heparin (porcine) 2,400 Units Intravenous PRN Cam Meyer MD    ipratropium 0 5 mg Nebulization TID Corby Meek MD    levalbuterol 1 25 mg Nebulization TID Corby Meek MD    levalbuterol 1 25 mg Nebulization Q6H PRN Christiana Schroeder MD    LORazepam 0 5 mg Intravenous Q4H PRN Corby Meek MD    methylPREDNISolone sodium succinate 40 mg Intravenous Q8H [address] Christiana Schroeder MD    morphine injection 2 mg Intravenous Q3H PRN Cam Meyer MD    nitroglycerin 0 4 mg Sublingual Q5 Min PRN Cam Meyer MD    sodium chloride 75 mL/hr Intravenous Continuous Cam Meyer MD Last Rate: 75 mL/hr (05/02/18 0657)   sodium chloride 3 mL Nebulization Q6H PRN Christiana Schroeder, MD    vancomycin 500 mg Intravenous Q12H Barbara Fink MD Last Rate: 500 mg (05/01/18 5554)         heparin (porcine) 3-20 Units/kg/hr (Order-Specific) Last Rate: 12 Units/kg/hr (05/02/18 9203)   sodium chloride 75 mL/hr Last Rate: 75 mL/hr (05/02/18 0657)         Physical exam:  Vitals: Body mass index is 15 73 kg/m²  Blood pressure 90/53, pulse 95, temperature 100 1 °F (37 8 °C), temperature source Temporal, resp  rate 22, height 5' 6" (1 676 m), weight 44 2 kg (97 lb 7 1 oz), SpO2 96 %  ,  Temp  Min: 97 3 °F (36 3 °C)  Max: 102 1 °F (38 9 °C)  IBW: 63 8 kg    SpO2: 96 %  SpO2 Activity: At Rest  O2 Device: Other (comment)      Intake/Output Summary (Last 24 hours) at 05/02/18 1026  Last data filed at 05/02/18 0630   Gross per 24 hour   Intake          1981 67 ml   Output              655 ml   Net          1326 67 ml       Invasive/non-invasive ventilation settings:   Respiratory    Lab Data (Last 4 hours)    None         O2/Vent Data (Last 4 hours)      05/02 0807          Non-Invasive Ventilation Mode BiPAP                 Invasive Devices     Peripheral Intravenous Line            Peripheral IV 05/01/18 Right Antecubital 1 day    Peripheral IV 05/01/18 Right Hand less than 1 day          Drain            Urethral Catheter 16 Fr  less than 1 day                  Diagnostic Data:  Lab: I have personally reviewed pertinent lab results     CBC:     Results from last 7 days  Lab Units 05/02/18  0442 05/01/18  0227   WBC Thousand/uL 13 84* 14 41*   HEMOGLOBIN g/dL 12 9 15 9   HEMATOCRIT % 39 0 48 6   PLATELETS Thousands/uL 206 243       CMP:     Results from last 7 days  Lab Units 05/02/18  0442 05/01/18  0227   SODIUM mmol/L 139 138   POTASSIUM mmol/L 4 0 4 2   CHLORIDE mmol/L 104 99*   CO2 mmol/L 30 34*   BUN mg/dL 23 18   CREATININE mg/dL 0 78 1 01   CALCIUM mg/dL 8 7 9 3   TOTAL PROTEIN g/dL 5 6* 8 0   BILIRUBIN TOTAL mg/dL 0 50 0 60   ALK PHOS U/L 56 85   ALT U/L 15 19   AST U/L 27 21   GLUCOSE RANDOM mg/dL 127 127     PT/INR:   Lab Results   Component Value Date    INR 1 17 (H) 05/02/2018   ,   Magnesium:   Results from last 7 days  Lab Units 05/02/18  0442 05/01/18  0227   MAGNESIUM mg/dL 1 7 1 7     Phosphorous:   Results from last 7 days  Lab Units 05/02/18  0442   PHOSPHORUS mg/dL 3 0       Microbiology:    Results from last 7 days  Lab Units 05/01/18  1103 05/01/18  0234 05/01/18  0227 05/01/18  7739   BLOOD CULTURE   --  No Growth at 24 hrs  No Growth at 24 hrs   --    GRAM STAIN RESULT   --   --   --  1+ Polys  1+ Gram positive cocci in clusters   INFLUENZA A PCR  None Detected  --   --   --    INFLUENZA B PCR  None Detected  --   --   --    RSV PCR  None Detected  --   --   --      Procalcitonin 12 3      Imaging:  Personally reviewed his chest x-ray and CT scan on the [facility] system  He has severe emphysema  No evidence of pulmonary embolism  There are several pulmonary nodules  No pulmonary infiltrate to suggest pneumonia however  Cardiac studies:  · Troponin 2 88  · BNP over 5000  · last echocardiogram November 2017 suggested moderate aortic stenosis with a reduced ejection fraction at 50% and grade 1 diastolic dysfunction There was normal RV systolic function and normal estimated pulmonary artery systolic pressure

## 2018-05-02 NOTE — ASSESSMENT & PLAN NOTE
Malnutrition Findings:       Multiple chronic conditions including COPD, CHF, tobacco abuse    BMI Findings: Body mass index is 15 73 kg/m²       Will obtain nutrition consult

## 2018-05-02 NOTE — TELEMEDICINE
Consultation - Infectious Disease   Toshia Khan 74 y o  male MRN: [id number]  Unit/Bed#:  Encounter: [id number]      Consults  Consult requested by Dr Richard Matta:     1  This service was provided via Telemedicine  2  Provider located at Home  3  TeleMed provider: Pilar Manzo MD   4  Identify all parties in room with patient during tele consult:  n/a  5  After connecting through [email or web address], patient was identified by name and date of birth and assistant checked wristband  Patient was then informed that this was a Telemedicine visit and that the exam was being conducted confidentially over secure lines  My office door was closed  No one else was in the room  Patient acknowledged consent and understanding of privacy and security of the Telemedicine visit, and gave us permission to have the assistant stay in the room in order to assist with the history and to conduct the exam   I informed the patient that I have reviewed their record in Epic and presented the opportunity for them to ask any questions regarding the visit today  The patient agreed to participate  Assessment/Recommendations     74year old male with very severe COPD, presents with septic shock, acute on chronic hypoxic respiratory failure, type 2 NSTEMI  - Source of sepsis uncertain - no evidence of pneumonia on imaging, no bacteremia  Urinary tract infection unlikely given lack of symptoms and negative urinalysis  - Patient had a small scrotal abscess on initial presentation which has since been drained with no evidence of deep infection/German's  Toxic shock syndrome unlikely given absence of rash and multisystem involvement    · Follow blood, urine and wound cultures   · Await results of TTE  · Follow daily procalcitonin level to assess response to antibiotic therapy   · Continue Vancomycin and Cefepime for now  · Supportive care per primary and critical care team    Will follow   Thank you for involving me in the care of your patient  History     Reason for Consult: Septic shock  HPI: Myron Avendano is a 74y o  year old male with COPD and chronic hypoxic respiratory failure on 2L of oxygen at home  History is limited and obtained mainly from the medical records  He presented to the ER on 5/1 with productive cough and worsening shortness of breath for a few days  He was also reported to have an open sore over the left side of his scrotum noticed by his home health nurses  In the ER he was tachycardic and tachypneic, in respiratory distress and per ER notes appeared to have a scrotal abscess  He went on to develop a fever to 102  Labs noted leukocytosis  Urinalysis was normal  Lactic acid was elevated  CXR showed developing pulmonary vascular congestion  Scrotal ultrasound noted bilateral complex hydroceles but no abscess  EKG showed sinus tachycardia  CTA chest showed no infiltrate  Scrotal abscess was drained in the ER  He was placed on BiPAP and started on cefepime and vancomycin  Urology was consulted for scrotal abscess but no deeper infection was noted, wound care was recommended  Overnight his blood pressure continued to be low despite fluid resuscitation  He has not been confused but has not been reported as cooperative with his care  Labs note mild improvement in white count but his troponins have been elevated  Blood culture is negtive till date  Wound culture on gram stain is showin gram positive cocci in clusters  Of note he was recently admitted in March for sepsis, acute on chronic hypoxic respiratory failure, rhinovirus and acute tracheobronchitis    Denies nausea, vomiting, diarrhea or rash and is tolerating antibiotics well  Infectious disease is being consulted for diagnostic work up and antibiotic management  Review of Systems  A [id number] point system-based review of systems is otherwise negative      PAST MEDICAL HISTORY:  Past Medical History:   Diagnosis Date    Anemia     Asthma     COPD (chronic obstructive pulmonary disease) (HCC)     Coronary artery disease     Heart disease     Left wrist injury     Low testosterone     [facility] [address] )     Vitamin D deficiency      History reviewed  No pertinent surgical history  FAMILY HISTORY:  Non-contributory    SOCIAL HISTORY:  Social History   Single  History   Alcohol Use No     History   Drug Use No     History   Smoking Status    Heavy Tobacco Smoker    Packs/day: 1 00    Years: 59 00    Types: Cigarettes   Smokeless Tobacco    Never Used       ALLERGIES:  No Known Allergies    MEDICATIONS:  All current active medications have been reviewed  Physical Exam     HR:  [] 95  Resp:  [14-37] 22  BP: ()/(50-70) 90/53  SpO2:  [93 %-99 %] 96 %  Temp (24hrs), Av 3 °F (37 4 °C), Min:97 3 °F (36 3 °C), Max:101 2 °F (38 4 °C)  Current: Temperature: 100 1 °F (37 8 °C)    Intake/Output Summary (Last 24 hours) at 18 1243  Last data filed at 18 0630   Gross per 24 hour   Intake          1481 67 ml   Output              530 ml   Net           951 67 ml       Physical exam findings reported by bedside and primary medical team staff    General Appearance:  Appearing cachectic  He is sleeping but easily arousable  Hard of hearing  Ill appearing     Head:  Normocephalic, without obvious abnormality, atraumatic   Eyes:  PERRL, conjunctiva pink and sclera anicteric, both eyes   Nose: Nares normal, mucosa normal, no drainage   Throat: Oropharynx moist without lesions; lips, mucosa, and tongue normal; teeth and gums normal   Neck: Supple, symmetrical, trachea midline, no adenopathy, no tenderness/mass/nodules   Back:   Symmetric, no curvature, ROM normal, no CVA tenderness   Lungs:   Bibasilar rales   Chest Wall:  No tenderness or deformity   Heart:  Regular rate and rhythm, S1, S2 normal, no murmur, rub or gallop   Abdomen:   Soft, non-tender, non-distended, positive bowel sounds, no masses, no organomegaly  No CVA tenderness  Left lateral aspect of scrotum, superficial ulcer about 1 cm diameter, no induration or discharge   Extremities: Extremities normal, atraumatic, no cyanosis, clubbing or edema   Skin: Skin color, texture, turgor normal, no rashes or lesions  No draining wounds noted  Lymph nodes: Cervical, supraclavicular, and axillary nodes normal   Neurologic: Alert  No focal deficit  Invasive Devices:   Peripheral IV 05/01/18 Right Antecubital (Active)   Site Assessment Clean;Dry; Intact 5/2/2018  9:14 AM   Dressing Type Securing device;Transparent 5/2/2018  9:14 AM   Line Status Saline locked 5/2/2018  9:14 AM   Dressing Status Clean;Dry; Intact 5/2/2018  9:14 AM       Peripheral IV 05/01/18 Right Hand (Active)   Site Assessment Clean;Dry; Intact 5/2/2018  9:14 AM   Dressing Type Securing device;Transparent 5/2/2018  9:14 AM   Line Status Infusing 5/2/2018  9:14 AM   Dressing Status Clean;Dry; Intact 5/2/2018  9:14 AM       Urethral Catheter 16 Fr  (Active)   Amt returned on insertion(mL) 130 mL 5/1/2018  6:20 PM   Site Assessment Clean;Skin intact 5/1/2018 10:00 PM   Collection Container Standard drainage bag 5/1/2018 10:00 PM   Securement Method Securing device (Describe) 5/1/2018 10:00 PM   Output (mL) 100 mL 5/2/2018  4:45 AM       Labs, Imaging, & Other Studies     Lab Results:    I have personally reviewed pertinent labs        Results from last 7 days  Lab Units 05/02/18  0442 05/01/18  0227   WBC Thousand/uL 13 84* 14 41*   HEMOGLOBIN g/dL 12 9 15 9   PLATELETS Thousands/uL 206 243       Results from last 7 days  Lab Units 05/02/18  0442 05/01/18  0227   SODIUM mmol/L 139 138   POTASSIUM mmol/L 4 0 4 2   CHLORIDE mmol/L 104 99*   CO2 mmol/L 30 34*   ANION GAP mmol/L 5 5   BUN mg/dL 23 18   CREATININE mg/dL 0 78 1 01   EGFR ml/min/1 73sq m 89 73   GLUCOSE RANDOM mg/dL 127 127   CALCIUM mg/dL 8 7 9 3   AST U/L 27 21   ALT U/L 15 19   ALK PHOS U/L 56 85   TOTAL PROTEIN g/dL 5 6* 8 0   BILIRUBIN TOTAL mg/dL 0 50 0 60       Results from last 7 days  Lab Units 05/01/18  1103 05/01/18  0234 05/01/18  0227 05/01/18  5219   BLOOD CULTURE   --  No Growth at 24 hrs  No Growth at 24 hrs   --    GRAM STAIN RESULT   --   --   --  1+ Polys  1+ Gram positive cocci in clusters   INFLUENZA A PCR  None Detected  --   --   --    INFLUENZA B PCR  None Detected  --   --   --    RSV PCR  None Detected  --   --   --        Imaging Studies:   I have personally reviewed pertinent imaging study reports and images in PACS  EKG, Pathology, and Other Studies:   I have personally reviewed pertinent reports  Counseling/Coordination of care: Total floor / unit time spent today 80 minutes  Greater than 50% of total time was spent with the patient and / or family counseling and / or coordination of care  A description of the counseling / coordination of care: Discussed in depth diagnosis, prognosis and work up of septic shock with patient  I also educated the patient about the importance of  compliance with medications  Labs, medical tests and imaging studies were independently reviewed by me as noted above in HPI and old records were obtained and summarized as noted above in HPI

## 2018-05-02 NOTE — ASSESSMENT & PLAN NOTE
Most likely type 2 secondary to hypotension  Patient started on heparin drip  Cardiology consulted  Continue to trend troponin, continue ICU monitoring

## 2018-05-02 NOTE — ASSESSMENT & PLAN NOTE
Most likely multifactorial secondary to acute respiratory failure and septic shock  Slightly improved with BiPAP but still lethargic and limited communication  Continue treatment of underlying conditions

## 2018-05-02 NOTE — PROCEDURES
Insert PICC line  Date/Time: 5/2/2018 10:35 AM  Performed by: Julia Medina  Authorized by: Barb Milligan     Patient location:  Bedside  Procedure performed by consultant: Sridevi Duarte RN  Consent:     Consent obtained:  Written (consent obtained by physician )    Consent given by:  Patient    Alternatives discussed:  No treatment  Universal protocol:     Procedure explained and questions answered to patient or proxy's satisfaction: yes      Relevant documents present and verified: yes      Test results available and properly labeled: yes      Imaging studies available: yes      Required blood products, implants, devices, and special equipment available: yes      Site/side marked: yes      Immediately prior to procedure, a time out was called: yes      Patient identity confirmed:  Verbally with patient and arm band  Pre-procedure details:     Hand hygiene: Hand hygiene performed prior to insertion      Sterile barrier technique: All elements of maximal sterile technique followed      Skin preparation:  2% chlorhexidine    Skin preparation agent: Skin preparation agent completely dried prior to procedure    Indications:     PICC line indications: medications requiring central line    Anesthesia (see MAR for exact dosages):      Anesthesia method:  Local infiltration    Local anesthetic:  Lidocaine 1% w/o epi (3 ml infiltrated into right upper arm)  Procedure details:     Location:  Median cubital    Vessel type: vein      Laterality:  Right    Approach: percutaneous technique used      Patient position:  Flat    Procedural supplies:  Double lumen    Catheter size:  5 Fr    Landmarks identified: yes      Ultrasound guidance: yes      Sterile ultrasound techniques: Sterile gel and sterile probe covers were used      Number of attempts:  2    Successful placement: yes      Vessel of catheter tip end:  Chest Xray needed to confirm placement    Total catheter length (cm):  34cm    Catheter out on skin (cm): 0cm    Max flow rate:  600ml per hour    Arm circumference:  23cm  Post-procedure details:     Post-procedure:  Dressing applied    Assessment:  Blood return through all ports, no pneumothorax on x-ray, placement verified by x-ray and free fluid flow    Post-procedure complications: none      Patient tolerance of procedure: Tolerated well, no immediate complications  Comments:      CCU notified that PICC tip position is good for use

## 2018-05-02 NOTE — ASSESSMENT & PLAN NOTE
Scrotal wound with tunneling noted on left lateral aspect of left testicle  Urology input appreciated, Urology evaluated and signed off  Proceed with wound care  Continue with cefepime and vancomycin

## 2018-05-02 NOTE — CONSULTS
Consultation - Cardiology   Aydin Ralph 74 y o  male MRN: [id number]  Unit/Bed#:  Encounter: [id number]  Physician Requesting Consult: Alexey Graham MD  Reason for Consult / Principal Problem: SOB    Assessment:  Principal Problem:    Septic shock ([place] [address] )  Active Problems:    COPD with acute exacerbation ([place] [address] )    Tobacco abuse    Severe protein-calorie malnutrition ([place] [address] )    Acute on chronic respiratory failure with hypoxia and hypercapnia ([place] [address] )    Scrotal abscess    Acute on chronic diastolic congestive heart failure (HCC)    Acute encephalopathy    NSTEMI (non-ST elevated myocardial infarction) ([place] [address] )  cardiomyopathy  Aortic Stenosis       Plan:  ECHO is pending  Elevated troponin now is likely a type 2 MI secondary to his sepsis / COPD / acidosis  Will trend troponin levels  Agree with heparin gtt for now  History of Present Illness     HPI: Aydin Ralph is a 74y o  year old male who presents with confusion, SOB, fever  Initial troponins were negative but troponin today is 2 88  Renal function is normal  He was very combative this morning  There is no history of cardiac problems  ECHO in 11/2017 showed an EF of 50%, with moderate AS  BNP is 5316  Review of Systems:    Alert awake oriented, comfortable, denies any complaints  No fevers chills nausea vomiting  No weakness, dizziness, seizures  positive for - shortness of breath  Denies any palpitations, chest pain, diaphoresis  Denies leg edema, pain or paresthesias  Denies any skin rashes  Denies abdominal pain, bloody stools, masses  Denies any depression or suicidal ideations      Historical Information   Past Medical History:   Diagnosis Date    Anemia     Asthma     COPD (chronic obstructive pulmonary disease) ([place] [address] )     Coronary artery disease     Heart disease     Left wrist injury     Low testosterone     Stroke ([place] [address] )     Vitamin D deficiency      History reviewed  No pertinent surgical history    History   Alcohol Use No History   Drug Use No     History   Smoking Status    Heavy Tobacco Smoker    Packs/day: 1 00    Years: 59 00    Types: Cigarettes   Smokeless Tobacco    Never Used     Family History: non-contributory    Meds/Allergies   all current active meds have been reviewed  No Known Allergies    Objective   Vitals: Blood pressure (!) 81/51, pulse 102, temperature 100 1 °F (37 8 °C), temperature source Temporal, resp  rate 18, height 5' 6" (1 676 m), weight 44 2 kg (97 lb 7 1 oz), SpO2 95 %  , Body mass index is 15 73 kg/m² , Orthostatic Blood Pressures      Most Recent Value   Blood Pressure   81/51 filed at 05/02/2018 0805   Patient Position - Orthostatic VS  Lying filed at 05/02/2018 0805            Intake/Output Summary (Last 24 hours) at 05/02/18 1009  Last data filed at 05/02/18 0630   Gross per 24 hour   Intake          1981 67 ml   Output              655 ml   Net          1326 67 ml               Physical Exam:  GEN: Donis Endow appears well, alert and oriented x 3, pleasant and cooperative   HEENT: pupils equal, round, and reactive to light; extraocular muscles intact  NECK: supple, no carotid bruits   HEART: regular rhythm, normal S1 and S2, no murmurs, clicks, gallops or rubs   LUNGS: scattered rhonchi   ABDOMEN: normal bowel sounds, soft, no tenderness, no distention  EXTREMITIES: peripheral pulses normal; no clubbing, cyanosis, or edema  NEURO: no focal findings   SKIN: normal without suspicious lesions on exposed skin    Lab Results:   Admission on 05/01/2018   Component Date Value Ref Range Status    WBC 05/01/2018 14 41* 4 31 - 10 16 Thousand/uL Final    RBC 05/01/2018 5 19  3 88 - 5 62 Million/uL Final    Hemoglobin 05/01/2018 15 9  12 0 - 17 0 g/dL Final    Hematocrit 05/01/2018 48 6  36 5 - 49 3 % Final    MCV 05/01/2018 94  82 - 98 fL Final    MCH 05/01/2018 30 6  26 8 - 34 3 pg Final    MCHC 05/01/2018 32 7  31 4 - 37 4 g/dL Final    RDW 05/01/2018 13 4  11 6 - 15 1 % Final    MPV 05/01/2018 9 8  8 9 - 12 7 fL Final    Platelets 98/43/7039 243  149 - 390 Thousands/uL Final    Neutrophils Relative 05/01/2018 86* 43 - 75 % Final    Lymphocytes Relative 05/01/2018 4* 14 - 44 % Final    Monocytes Relative 05/01/2018 10  4 - 12 % Final    Eosinophils Relative 05/01/2018 0  0 - 6 % Final    Basophils Relative 05/01/2018 0  0 - 1 % Final    Neutrophils Absolute 05/01/2018 12 47* 1 85 - 7 62 Thousands/µL Final    Lymphocytes Absolute 05/01/2018 0 54* 0 60 - 4 47 Thousands/µL Final    Monocytes Absolute 05/01/2018 1 37* 0 17 - 1 22 Thousand/µL Final    Eosinophils Absolute 05/01/2018 0 00  0 00 - 0 61 Thousand/µL Final    Basophils Absolute 05/01/2018 0 03  0 00 - 0 10 Thousands/µL Final    Magnesium 05/01/2018 1 7  1 6 - 2 6 mg/dL Final    Blood Culture 05/01/2018 No Growth at 24 hrs  Preliminary    Blood Culture 05/01/2018 No Growth at 24 hrs  Preliminary    Sodium 05/01/2018 138  136 - 145 mmol/L Final    Potassium 05/01/2018 4 2  3 5 - 5 3 mmol/L Final    Slightly Hemolyzed; Results May be Affected    Chloride 05/01/2018 99* 100 - 108 mmol/L Final    CO2 05/01/2018 34* 21 - 32 mmol/L Final    Anion Gap 05/01/2018 5  4 - 13 mmol/L Final    BUN 05/01/2018 18  5 - 25 mg/dL Final    Creatinine 05/01/2018 1 01  0 60 - 1 30 mg/dL Final    Standardized to IDMS reference method    Glucose 05/01/2018 127  65 - 140 mg/dL Final      If the patient is fasting, the ADA then defines impaired fasting glucose as > 100 mg/dL and diabetes as > or equal to 123 mg/dL  Specimen collection should occur prior to Sulfasalazine administration due to the potential for falsely depressed results  Specimen collection should occur prior to Sulfapyridine administration due to the potential for falsely elevated results   Calcium 05/01/2018 9 3  8 3 - 10 1 mg/dL Final    AST 05/01/2018 21  5 - 45 U/L Final    Slightly Hemolyzed;  Results May be Affected  Specimen collection should occur prior to Sulfasalazine administration due to the potential for falsely depressed results   ALT 05/01/2018 19  12 - 78 U/L Final      Specimen collection should occur prior to Sulfasalazine administration due to the potential for falsely depressed results   Alkaline Phosphatase 05/01/2018 85  46 - 116 U/L Final    Total Protein 05/01/2018 8 0  6 4 - 8 2 g/dL Final    Albumin 05/01/2018 3 2* 3 5 - 5 0 g/dL Final    Total Bilirubin 05/01/2018 0 60  0 20 - 1 00 mg/dL Final    eGFR 05/01/2018 73  ml/min/1 73sq m Final    D-Dimer, Quant 05/01/2018 515* 0 - 424 ng/ml (FEU) Final      Reference and upper limits to exclude DVT and PE are the same  Do not use to exclude if clinical symptoms are present   Troponin I 05/01/2018 <0 02  <=0 04 ng/mL Final    3Autovalidation override    Gram Stain Result 05/01/2018 1+ Polys   Preliminary    Gram Stain Result 05/01/2018 1+ Gram positive cocci in clusters   Preliminary    Color, UA 05/01/2018 [place]   Final    Clarity, UA 05/01/2018 Clear   Final    Specific Gravity, UA 05/01/2018 1 025  1 003 - 1 030 Final    pH, UA 05/01/2018 5 5  4 5 - 8 0 Final    Leukocytes, UA 05/01/2018 Negative  Negative Final    Nitrite, UA 05/01/2018 Negative  Negative Final    Protein, UA 05/01/2018 30 (1+)* Negative mg/dl Final    Glucose, UA 05/01/2018 Negative  Negative mg/dl Final    Ketones, UA 05/01/2018 Trace* Negative mg/dl Final    Urobilinogen, UA 05/01/2018 0 2  0 2, 1 0 E U /dl E U /dl Final    Bilirubin, UA 05/01/2018 Interference- unable to analyze* Negative Final    The dipstick result may be falsely positive do to interfering substances  We recommend reliance upon serum bilirubin, liver & kidney function tests to guide patient care if clinically indicated      Blood, UA 05/01/2018 Large* Negative, Trace-Intact Final    RBC, UA 05/01/2018 4-10* None Seen, 0-5 /hpf Final    WBC, UA 05/01/2018 0-1* None Seen, 0-5, 5-55, 5-65 /hpf Final    Epithelial Cells 05/01/2018 None Seen  None Seen, Occasional /hpf Final    Bacteria, UA 05/01/2018 Occasional  None Seen, Occasional /hpf Final    MUCOUS THREADS 05/01/2018 Moderate* None Seen Final    pH, Arterial 05/01/2018 7 257* 7 350 - 7 450 Final    pCO2, Arterial 05/01/2018 63 5* 36 0 - 44 0 mm Hg Final    pO2, Arterial 05/01/2018 61 1* 75 0 - 129 0 mm Hg Final    HCO3, Arterial 05/01/2018 27 7  22 0 - 28 0 mmol/L Final    Base Excess, Arterial 05/01/2018 -1 2  mmol/L Final    O2 Content, Arterial 05/01/2018 19 7  16 0 - 23 0 mL/dL Final    O2 HGB,Arterial  05/01/2018 87 9* 94 0 - 97 0 % Final    SOURCE 05/01/2018 Radial, Left   Final    WICHO TEST 05/01/2018 Yes   Final    Non Vent type BIPAP 05/01/2018 BIPAP   Final    IPAP 05/01/2018 18   Final    EPAP 05/01/2018 5   Final    BIPAP fio2 05/01/2018 28  % Final    Ventricular Rate 05/01/2018 128  BPM Final    Atrial Rate 05/01/2018 128  BPM Final    ME Interval 05/01/2018 140  ms Final    QRSD Interval 05/01/2018 72  ms Final    QT Interval 05/01/2018 306  ms Final    QTC Interval 05/01/2018 446  ms Final    P Axis 05/01/2018 87  degrees Final    QRS Axis 05/01/2018 4  degrees Final    T Wave [place] 05/01/2018 70  degrees Final    pH, Arterial 05/01/2018 7 314* 7 350 - 7 450 Final    pCO2, Arterial 05/01/2018 59 1* 36 0 - 44 0 mm Hg Final    pO2, Arterial 05/01/2018 78 8  75 0 - 129 0 mm Hg Final    HCO3, Arterial 05/01/2018 29 4* 22 0 - 28 0 mmol/L Final    Base Excess, Arterial 05/01/2018 1 6  mmol/L Final    O2 Content, Arterial 05/01/2018 20 3  16 0 - 23 0 mL/dL Final    O2 HGB,Arterial  05/01/2018 93 9* 94 0 - 97 0 % Final    SOURCE 05/01/2018 Radial, Right   Final    WICHO TEST 05/01/2018 Yes   Final    Nasal Cannula 05/01/2018 2 5 l/m  pt  ripped mask off    Bipap off for about 10 minutes   Final    Procalcitonin 05/01/2018 12 33* <=0 25 ng/ml Final    Comment: Suspected Lower Respiratory Tract Infection (LRTI):   -   0 01 - 0 25 ng/mL   :low likelihood for bacterial infection; antibiotics discouraged  -   > 0 25 ng/mL   :increased likelihood bacterial infection; antibiotics encouraged  Suspected Sepsis: strongly consider initiating antibiotics in all unstable patients    -   0 01 - 0 5 ng/mL   :low likelihood for sepsis; antibiotics discouraged  -   > 0 5 to 2 ng/mL   :increased likelihood for sepsis; antibiotics encouraged  -   > 2 ng/mL   :high risk of severe sepsis / septic shock; antibiotics strongly encouraged  Decisions on antibiotic use should not be based solely on procalcitonin levels  If antibiotics are held but uncertainty exists, repeat a procalcitonin level in 6-12 hours to confirm low level  If antibiotics are administered, repeat procalcitonin testing should be obtained every 1-2 days to consider early antibiotic cessation (when > 80% decrease from the peak level OR at <= 0 25 ng/mL for LRTI or ,<= 0 5 ng/mL for                            sepsis), so long as the infection is not one typically requiring prolonged durations of therapy (e g , bone/joint infections, endocarditis, etc )       Situations where procalcitonin elevations may be due to a non-bacterial cause:   -   Newborns < 67 hours old   -   Massive stress from severe trauma, surgery, cardiac shock, burns (in absence of infection, levels trend down after inciting event)   -   Treatment with agents which stimulate cytokines (OKT3, anti-lymphocyte globulins, alemtuzumab, IL-2, granulocyte transfusion)   -   Malaria and some fungal infections   -   Prolonged, severe cardiogenic shock or organ perfusion abnormalities   -   Some forms of vasculitis and acute graft vs  host disease   -   Paraneoplastic syndromes due to medullary thyroid and small cell lung cancer   -   Significantly compromised renal function, especially ESRD/hemodialysis (consider > 0 75 ng/mL as an abnormal cut-off)      LACTIC ACID 05/01/2018 2 2* 0 5 - 2 0 mmol/L Final    INFLU A PCR 05/01/2018 None Detected  None Detected Final    INFLU B PCR 05/01/2018 None Detected  None Detected Final    RSV PCR 05/01/2018 None Detected  None Detected Final    LACTIC ACID 05/01/2018 2 5* 0 5 - 2 0 mmol/L Final    PTT 05/01/2018 45* 23 - 35 seconds Final    Therapeutic Heparin Range =  60-90 seconds    LACTIC ACID 05/01/2018 1 9  0 5 - 2 0 mmol/L Final    Protime 05/02/2018 14 8* 12 1 - 14 4 seconds Final    INR 05/02/2018 1 17* 0 86 - 1 16 Final    WBC 05/02/2018 13 84* 4 31 - 10 16 Thousand/uL Final    RBC 05/02/2018 4 15  3 88 - 5 62 Million/uL Final    Hemoglobin 05/02/2018 12 9  12 0 - 17 0 g/dL Final    Hematocrit 05/02/2018 39 0  36 5 - 49 3 % Final    MCV 05/02/2018 94  82 - 98 fL Final    MCH 05/02/2018 31 1  26 8 - 34 3 pg Final    MCHC 05/02/2018 33 1  31 4 - 37 4 g/dL Final    RDW 05/02/2018 13 5  11 6 - 15 1 % Final    MPV 05/02/2018 10 1  8 9 - 12 7 fL Final    Platelets 25/76/4771 206  149 - 390 Thousands/uL Final    Sodium 05/02/2018 139  136 - 145 mmol/L Final    Potassium 05/02/2018 4 0  3 5 - 5 3 mmol/L Final    Chloride 05/02/2018 104  100 - 108 mmol/L Final    CO2 05/02/2018 30  21 - 32 mmol/L Final    Anion Gap 05/02/2018 5  4 - 13 mmol/L Final    BUN 05/02/2018 23  5 - 25 mg/dL Final    Creatinine 05/02/2018 0 78  0 60 - 1 30 mg/dL Final    Standardized to IDMS reference method    Glucose 05/02/2018 127  65 - 140 mg/dL Final      If the patient is fasting, the ADA then defines impaired fasting glucose as > 100 mg/dL and diabetes as > or equal to 123 mg/dL  Specimen collection should occur prior to Sulfasalazine administration due to the potential for falsely depressed results  Specimen collection should occur prior to Sulfapyridine administration due to the potential for falsely elevated results      Calcium 05/02/2018 8 7  8 3 - 10 1 mg/dL Final    AST 05/02/2018 27  5 - 45 U/L Final      Specimen collection should occur prior to Sulfasalazine administration due to the potential for falsely depressed results   ALT 05/02/2018 15  12 - 78 U/L Final      Specimen collection should occur prior to Sulfasalazine administration due to the potential for falsely depressed results       Alkaline Phosphatase 05/02/2018 56  46 - 116 U/L Final    Total Protein 05/02/2018 5 6* 6 4 - 8 2 g/dL Final    Albumin 05/02/2018 2 2* 3 5 - 5 0 g/dL Final    Total Bilirubin 05/02/2018 0 50  0 20 - 1 00 mg/dL Final    eGFR 05/02/2018 89  ml/min/1 73sq m Final    Magnesium 05/02/2018 1 7  1 6 - 2 6 mg/dL Final    Phosphorus 05/02/2018 3 0  2 3 - 4 1 mg/dL Final    LACTIC ACID 05/02/2018 1 4  0 5 - 2 0 mmol/L Final    NT-proBNP 05/02/2018 5316* <125 pg/mL Final    Troponin I 05/02/2018 2 88* <=0 04 ng/mL Final    PTT 05/02/2018 32  23 - 35 seconds Final    Therapeutic Heparin Range =  60-90 seconds    Segmented % 05/02/2018 91* 43 - 75 % Final    Lymphocytes % 05/02/2018 4* 14 - 44 % Final    Monocytes % 05/02/2018 4  4 - 12 % Final    Eosinophils % 05/02/2018 0  0 - 6 % Final    Basophils % 05/02/2018 0  0 - 1 % Final    Atypical Lymphocytes % 05/02/2018 1* <=0 % Final    Absolute Neutrophils 05/02/2018 12 59* 1 85 - 7 62 Thousand/uL Final    Lymphocytes Absolute 05/02/2018 0 55* 0 60 - 4 47 Thousand/uL Final    Monocytes Absolute 05/02/2018 0 55  0 00 - 1 22 Thousand/uL Final    Eosinophils Absolute 05/02/2018 0 00  0 00 - 0 40 Thousand/uL Final    Basophils Absolute 05/02/2018 0 00  0 00 - 0 10 Thousand/uL Final    Total Counted 05/02/2018 100   Final    Platelet Estimate 12/13/6379 Adequate  Adequate Final         Results from last 7 days  Lab Units 05/02/18  0442 05/01/18  0227   TROPONIN I ng/mL 2 88* <0 02     Results from last 7 days  Lab Units 05/02/18  0442 05/01/18  0227   WBC Thousand/uL 13 84* 14 41*   HEMOGLOBIN g/dL 12 9 15 9   HEMATOCRIT % 39 0 48 6   PLATELETS Thousands/uL 206 243           Results from last 7 days  Lab Units 05/02/18  0442 05/01/18  0227   SODIUM mmol/L 139 138   POTASSIUM mmol/L 4 0 4 2   CHLORIDE mmol/L 104 99*   CO2 mmol/L 30 34*   BUN mg/dL 23 18   CREATININE mg/dL 0 78 1 01   CALCIUM mg/dL 8 7 9 3   TOTAL PROTEIN g/dL 5 6* 8 0   BILIRUBIN TOTAL mg/dL 0 50 0 60   ALK PHOS U/L 56 85   ALT U/L 15 19   AST U/L 27 21   GLUCOSE RANDOM mg/dL 127 127     Results from last 7 days  Lab Units 05/02/18  0442   INR  1 17*           Imaging: I have personally reviewed pertinent reports  EKG: ST, low QRS voltage, criteria for old anterior and inferior infarcts    Echo: Last ECHO - 11/2017, EF 50%, moderate AS                Counseling / Coordination of Care  Total floor / unit time spent today 55 minutes  Greater than 50% of total time was spent with the patient and / or family counseling and / or coordination of care

## 2018-05-02 NOTE — MALNUTRITION/BMI
This medical record reflects one or more clinical indicators suggestive of malnutrition  Malnutrition Findings:   Malnutrition type: Chronic illness  Degree of Malnutrition: Other severe protein calorie malnutrition  Malnutrition Characteristics: Fat loss, Muscle loss, Inadequate energy, Weight loss (Severe PCM r/t suspected prolong inadequate po intake with significant wt loss and muscle fat wasting in clavicle, facial and temporal regions )    BMI Findings:  BMI Classifications: Underweight < 18 5     Body mass index is 15 73 kg/m²  See Nutrition note dated 5/2/2018 for additional details  Completed nutrition assessment is viewable in the nutrition documentation

## 2018-05-02 NOTE — PLAN OF CARE
Problem: DISCHARGE PLANNING - CARE MANAGEMENT  Goal: Discharge to post-acute care or home with appropriate resources  INTERVENTIONS:  - Conduct assessment to determine patient/family and health care team treatment goals, and need for post-acute services based on payer coverage, community resources, and patient preferences, and barriers to discharge  - Address psychosocial, clinical, and financial barriers to discharge as identified in assessment in conjunction with the patient/family and health care team  - Arrange appropriate level of post-acute services according to patient's   needs and preference and payer coverage in collaboration with the physician and health care team  - Communicate with and update the patient/family, physician, and health care team regarding progress on the discharge plan  - Arrange appropriate transportation to post-acute venues  Pt has aides from [address] living   Pt might benefit from home care on discharge  Outcome: Progressing

## 2018-05-02 NOTE — ASSESSMENT & PLAN NOTE
With acute respiratory failure  Evidence of vascular congestion on chest x-ray  For now unable to use Lasix due to hypotension  Cardiology consulted

## 2018-05-02 NOTE — PHYSICAL THERAPY NOTE
PT NOTE:          Attempted to see pt however pt back on Bipap and with low BP  Nursing requesting to hold PT evaluation at this time  Will continue to follow and reattempt as able

## 2018-05-02 NOTE — SOCIAL WORK
Met with pt to discuss role as  in helping pt to develop discharge plan and to help pt carry out their plan  Pt lives with friends    Pt has no steps on the outside  Pt has 13 steps on the inside  Pt has his bedroom and bedside commode on the first floor  Pt has a bathroom on the 2nd floor  Pt has a cane, nebulizer, 02 , bedside commode, shower chair at home  Pt is using the cane to ambulate  Pt has 02 at 2 liters via [place] at [place] [facility]  He gets his nebulizer from [facility]  Pt needs assistance with ADL's  Pt has aides that come to help him from [place] [address] for 11 hours a week  Pt's  from [place] [address] is Zina Holloway   Her phone number is [phone]  Pt's aides help pt with meal prep   Pt's friends drive him to appts  Pt's PCP is Jessica Hull  Pt uses the  [employer] in [facility]  Discharge checklist discussed with patient  Pt will benefit from home care on discharge

## 2018-05-03 PROBLEM — I50.32 CHRONIC DIASTOLIC CONGESTIVE HEART FAILURE (HCC): Status: ACTIVE | Noted: 2018-05-01

## 2018-05-03 LAB
ANION GAP SERPL CALCULATED.3IONS-SCNC: 3 MMOL/L (ref 4–13)
APTT PPP: 45 SECONDS (ref 23–35)
APTT PPP: 54 SECONDS (ref 23–35)
APTT PPP: 56 SECONDS (ref 23–35)
BASOPHILS # BLD MANUAL: 0 THOUSAND/UL (ref 0–0.1)
BASOPHILS NFR MAR MANUAL: 0 % (ref 0–1)
BUN SERPL-MCNC: 23 MG/DL (ref 5–25)
CALCIUM SERPL-MCNC: 8.5 MG/DL (ref 8.3–10.1)
CHLORIDE SERPL-SCNC: 104 MMOL/L (ref 100–108)
CO2 SERPL-SCNC: 32 MMOL/L (ref 21–32)
CREAT SERPL-MCNC: 0.76 MG/DL (ref 0.6–1.3)
EOSINOPHIL # BLD MANUAL: 0 THOUSAND/UL (ref 0–0.4)
EOSINOPHIL NFR BLD MANUAL: 0 % (ref 0–6)
ERYTHROCYTE [DISTWIDTH] IN BLOOD BY AUTOMATED COUNT: 13.3 % (ref 11.6–15.1)
GFR SERPL CREATININE-BSD FRML MDRD: 90 ML/MIN/1.73SQ M
GLUCOSE SERPL-MCNC: 187 MG/DL (ref 65–140)
HCT VFR BLD AUTO: 37.4 % (ref 36.5–49.3)
HGB BLD-MCNC: 12.4 G/DL (ref 12–17)
LYMPHOCYTES # BLD AUTO: 0 % (ref 14–44)
LYMPHOCYTES # BLD AUTO: 0 THOUSAND/UL (ref 0.6–4.47)
MCH RBC QN AUTO: 31.2 PG (ref 26.8–34.3)
MCHC RBC AUTO-ENTMCNC: 33.2 G/DL (ref 31.4–37.4)
MCV RBC AUTO: 94 FL (ref 82–98)
MONOCYTES # BLD AUTO: 1.03 THOUSAND/UL (ref 0–1.22)
MONOCYTES NFR BLD: 6 % (ref 4–12)
NEUTROPHILS # BLD MANUAL: 16.15 THOUSAND/UL (ref 1.85–7.62)
NEUTS BAND NFR BLD MANUAL: 6 % (ref 0–8)
NEUTS SEG NFR BLD AUTO: 88 % (ref 43–75)
PLATELET # BLD AUTO: 231 THOUSANDS/UL (ref 149–390)
PLATELET BLD QL SMEAR: ADEQUATE
PMV BLD AUTO: 10.4 FL (ref 8.9–12.7)
POTASSIUM SERPL-SCNC: 3.8 MMOL/L (ref 3.5–5.3)
PROCALCITONIN SERPL-MCNC: 8.74 NG/ML
RBC # BLD AUTO: 3.98 MILLION/UL (ref 3.88–5.62)
SODIUM SERPL-SCNC: 139 MMOL/L (ref 136–145)
TOTAL CELLS COUNTED SPEC: 100
VANCOMYCIN TROUGH SERPL-MCNC: 7.9 UG/ML (ref 10–20)
WBC # BLD AUTO: 17.18 THOUSAND/UL (ref 4.31–10.16)

## 2018-05-03 PROCEDURE — 97535 SELF CARE MNGMENT TRAINING: CPT

## 2018-05-03 PROCEDURE — 97167 OT EVAL HIGH COMPLEX 60 MIN: CPT

## 2018-05-03 PROCEDURE — 94760 N-INVAS EAR/PLS OXIMETRY 1: CPT

## 2018-05-03 PROCEDURE — 80202 ASSAY OF VANCOMYCIN: CPT | Performed by: FAMILY MEDICINE

## 2018-05-03 PROCEDURE — G8987 SELF CARE CURRENT STATUS: HCPCS

## 2018-05-03 PROCEDURE — 85007 BL SMEAR W/DIFF WBC COUNT: CPT | Performed by: FAMILY MEDICINE

## 2018-05-03 PROCEDURE — 97163 PT EVAL HIGH COMPLEX 45 MIN: CPT | Performed by: PHYSICAL THERAPIST

## 2018-05-03 PROCEDURE — G8988 SELF CARE GOAL STATUS: HCPCS

## 2018-05-03 PROCEDURE — 85730 THROMBOPLASTIN TIME PARTIAL: CPT | Performed by: FAMILY MEDICINE

## 2018-05-03 PROCEDURE — 36569 INSJ PICC 5 YR+ W/O IMAGING: CPT

## 2018-05-03 PROCEDURE — 97530 THERAPEUTIC ACTIVITIES: CPT | Performed by: PHYSICAL THERAPIST

## 2018-05-03 PROCEDURE — 94660 CPAP INITIATION&MGMT: CPT

## 2018-05-03 PROCEDURE — 87070 CULTURE OTHR SPECIMN AEROBIC: CPT | Performed by: FAMILY MEDICINE

## 2018-05-03 PROCEDURE — 85027 COMPLETE CBC AUTOMATED: CPT | Performed by: FAMILY MEDICINE

## 2018-05-03 PROCEDURE — 99233 SBSQ HOSP IP/OBS HIGH 50: CPT | Performed by: FAMILY MEDICINE

## 2018-05-03 PROCEDURE — C1751 CATH, INF, PER/CENT/MIDLINE: HCPCS

## 2018-05-03 PROCEDURE — 80048 BASIC METABOLIC PNL TOTAL CA: CPT | Performed by: FAMILY MEDICINE

## 2018-05-03 PROCEDURE — 84145 PROCALCITONIN (PCT): CPT | Performed by: INTERNAL MEDICINE

## 2018-05-03 PROCEDURE — G8978 MOBILITY CURRENT STATUS: HCPCS | Performed by: PHYSICAL THERAPIST

## 2018-05-03 PROCEDURE — 94640 AIRWAY INHALATION TREATMENT: CPT

## 2018-05-03 PROCEDURE — G8979 MOBILITY GOAL STATUS: HCPCS | Performed by: PHYSICAL THERAPIST

## 2018-05-03 PROCEDURE — 87205 SMEAR GRAM STAIN: CPT | Performed by: FAMILY MEDICINE

## 2018-05-03 RX ORDER — METHYLPREDNISOLONE SODIUM SUCCINATE 40 MG/ML
30 INJECTION, POWDER, LYOPHILIZED, FOR SOLUTION INTRAMUSCULAR; INTRAVENOUS EVERY 12 HOURS SCHEDULED
Status: DISCONTINUED | OUTPATIENT
Start: 2018-05-03 | End: 2018-05-05

## 2018-05-03 RX ADMIN — IPRATROPIUM BROMIDE 0.5 MG: 0.5 SOLUTION RESPIRATORY (INHALATION) at 08:22

## 2018-05-03 RX ADMIN — METHYLPREDNISOLONE SODIUM SUCCINATE 40 MG: 40 INJECTION, POWDER, FOR SOLUTION INTRAMUSCULAR; INTRAVENOUS at 14:04

## 2018-05-03 RX ADMIN — VANCOMYCIN HYDROCHLORIDE 1000 MG: 1 INJECTION, POWDER, LYOPHILIZED, FOR SOLUTION INTRAVENOUS at 14:04

## 2018-05-03 RX ADMIN — NOREPINEPHRINE BITARTRATE 5 MCG/MIN: 1 INJECTION INTRAVENOUS at 02:24

## 2018-05-03 RX ADMIN — VANCOMYCIN HYDROCHLORIDE 500 MG: 500 INJECTION, POWDER, LYOPHILIZED, FOR SOLUTION INTRAVENOUS at 00:08

## 2018-05-03 RX ADMIN — IPRATROPIUM BROMIDE 0.5 MG: 0.5 SOLUTION RESPIRATORY (INHALATION) at 13:33

## 2018-05-03 RX ADMIN — CEFEPIME HYDROCHLORIDE 2000 MG: 2 INJECTION, SOLUTION INTRAVENOUS at 23:29

## 2018-05-03 RX ADMIN — IPRATROPIUM BROMIDE 0.5 MG: 0.5 SOLUTION RESPIRATORY (INHALATION) at 21:31

## 2018-05-03 RX ADMIN — LEVALBUTEROL 1.25 MG: 1.25 SOLUTION, CONCENTRATE RESPIRATORY (INHALATION) at 21:31

## 2018-05-03 RX ADMIN — SODIUM CHLORIDE 75 ML/HR: 0.9 INJECTION, SOLUTION INTRAVENOUS at 09:12

## 2018-05-03 RX ADMIN — METHYLPREDNISOLONE SODIUM SUCCINATE 30 MG: 40 INJECTION, POWDER, FOR SOLUTION INTRAMUSCULAR; INTRAVENOUS at 20:48

## 2018-05-03 RX ADMIN — ASPIRIN 325 MG: 325 TABLET, DELAYED RELEASE ORAL at 08:55

## 2018-05-03 RX ADMIN — FLUTICASONE PROPIONATE AND SALMETEROL 1 PUFF: 50; 250 POWDER RESPIRATORY (INHALATION) at 08:55

## 2018-05-03 RX ADMIN — NICOTINE 21 MG: 21 PATCH, EXTENDED RELEASE TRANSDERMAL at 08:58

## 2018-05-03 RX ADMIN — HEPARIN SODIUM AND DEXTROSE 20 UNITS/KG/HR: 10000; 5 INJECTION INTRAVENOUS at 13:44

## 2018-05-03 RX ADMIN — HEPARIN SODIUM 1200 UNITS: 1000 INJECTION, SOLUTION INTRAVENOUS; SUBCUTANEOUS at 17:19

## 2018-05-03 RX ADMIN — METHYLPREDNISOLONE SODIUM SUCCINATE 40 MG: 40 INJECTION, POWDER, FOR SOLUTION INTRAMUSCULAR; INTRAVENOUS at 05:44

## 2018-05-03 RX ADMIN — HEPARIN SODIUM 1200 UNITS: 1000 INJECTION, SOLUTION INTRAVENOUS; SUBCUTANEOUS at 03:05

## 2018-05-03 RX ADMIN — FLUTICASONE PROPIONATE AND SALMETEROL 1 PUFF: 50; 250 POWDER RESPIRATORY (INHALATION) at 20:47

## 2018-05-03 RX ADMIN — LEVALBUTEROL 1.25 MG: 1.25 SOLUTION, CONCENTRATE RESPIRATORY (INHALATION) at 13:33

## 2018-05-03 RX ADMIN — CEFEPIME HYDROCHLORIDE 2000 MG: 2 INJECTION, SOLUTION INTRAVENOUS at 11:21

## 2018-05-03 RX ADMIN — LEVALBUTEROL 1.25 MG: 1.25 SOLUTION, CONCENTRATE RESPIRATORY (INHALATION) at 08:22

## 2018-05-03 RX ADMIN — HEPARIN SODIUM 1200 UNITS: 1000 INJECTION, SOLUTION INTRAVENOUS; SUBCUTANEOUS at 10:48

## 2018-05-03 NOTE — ASSESSMENT & PLAN NOTE
Most likely multifactorial secondary to acute respiratory failure and septic shock, resolved  Slightly improved with BiPAP, patient sitting up in chair, completed most of his breakfast, able to communicate - noted to be a poor historian  Continue treatment of underlying conditions

## 2018-05-03 NOTE — CONSULTS
[address] [facility] 74 y o  male MRN: [id number]  Unit/Bed#:  Encounter: [id number]    Physician Requesting Consult:  Dr Kenyatta Taylor  Reason for Consult:  Respiratory failure, sepsis    Assessment/Plan:  1  Sepsis with septic shock, shock component now resolved  · Source continues to remain on clear  · Procalcitonin is improving  · Await the results of the cultures, influenza negative new line polymicrobial wound growth from scrotal ulcer, likely not the source of infection however  Blood cultures remain negative so far  Urine culture is negative  · Continue vancomycin and cefepime  · Continue cautious hydration  · Has not required vasopressors  2  Very severe COPD with chronic hypoxemic respiratory failure   · Will reduce the dose of Solu-Medrol  · Continue Bronchodilators  · Use BiPAP as needed, currently off BiPAP and appears comfortable on nasal cannula  3  Acute hypercapnic and hypoxic respiratory failure on chronic hypoxemic respiratory failure  · Continue nasal cannula oxygen  · Use BiPAP at HS and p r n   4  Type 2 non ST elevation myocardial infarction secondary to demand ischemia and sepsis  · Recheck troponin in a m  · Cardiology consultation noted  5  Chronic systolic and diastolic congestive heart failure with moderate aortic stenosis  · Still without evidence of acute cardiac decompensation  · Caution with IV fluids at this point  · Consider discontinuation of IV fluid now that he is eating and drinking appropriately and no longer hypotensive  7  Severe protein calorie malnutrition  · Encourage oral intake  8  Ongoing tobacco abuse, heavy utilization with close to 3 packs a day of smoking  · Consider nicotine replacement therapy  · Complete cessation recommended    DNR/DNI - I confirmed this with the patient  We discussed his wishes in the event of worsening status  He does not want mechanical ventilation, CPR, or aggressive heroic interventions    He did confirm these wishes with Dr Elvira Sims yesterday as well   _____________________________________________________________________      HPI:    Jordy Pena is a 74 y o  male who is well known to me from recent office evaluation in March who presents severe respiratory distress, hypotension, and concern for septic shock  He did not have any focal source of infection at the time of presentation  I discussed with Dr Elvira Sims yesterday regarding his presentation  See yesterday's progress note for prior recommendations provided telephonically  Currently he is up in a chair  He is feeling better  He is extremely hard of hearing and a very difficult historian  He denies any chest pain  He is eating  He denies any poor appetite, abdominal pain, or other focal symptoms  He does have a wet cough but has difficulty mobilizing phlegm  This is actually his baseline  He has not been able to provide a sputum specimen  He denies any nausea or vomiting  He does not have headache  He is feeling better overall       Review of Systems:    Full review of systems was attempted but limited due to the patient's responsiveness and has hearing difficulty  Aside from what was mentioned in the HPI, it is otherwise negative  Historical Information   Past Medical History:   Diagnosis Date    Anemia     Asthma     COPD (chronic obstructive pulmonary disease) (HCC)     Coronary artery disease     Heart disease     Left wrist injury     Low testosterone     Stroke ([facility] [address] )     Vitamin D deficiency      History reviewed  No pertinent surgical history    Social History   History   Alcohol Use No     History   Drug Use No     History   Smoking Status    Heavy Tobacco Smoker    Packs/day: 1 00    Years: 59 00    Types: Cigarettes   Smokeless Tobacco    Never Used       Family History:   Family History   Problem Relation Age of Onset    Family history unknown: Yes       Medications:  Pertinent medications were reviewed    Current Facility-Administered Medications:  acetaminophen 650 mg Rectal Q6H PRN Amanda Pittman MD    aspirin 325 mg Oral Daily Ke Rico MD    cefepime 2,000 mg Intravenous Q12H Amanda Pittman MD Last Rate: 2,000 mg (05/03/18 1121)   fluticasone-salmeterol 1 puff Inhalation Q12H [facility] Christiana Schroeder MD    heparin (porcine) 3-20 Units/kg/hr (Order-Specific) Intravenous Titrated Ke Rico MD Last Rate: 20 Units/kg/hr (05/03/18 1344)   heparin (porcine) 1,200 Units Intravenous PRN Ke Rico MD    heparin (porcine) 2,400 Units Intravenous PRN Ke Rico MD    ipratropium 0 5 mg Nebulization TID Amanda Pittman MD    levalbuterol 1 25 mg Nebulization TID Amanda Pittman MD    levalbuterol 1 25 mg Nebulization Q6H PRN Amanda Pittman MD    LORazepam 0 5 mg Intravenous Q4H PRN Amanda Pittman MD    methylPREDNISolone sodium succinate 30 mg Intravenous Q12H 72 Pavel Fuller MD    morphine injection 2 mg Intravenous Q3H PRN Ke Rico MD    nicotine 21 mg Transdermal Daily Amanda Pitmtan MD    nitroglycerin 0 4 mg Sublingual Q5 Min PRN Ke Rico MD    sodium chloride 75 mL/hr Intravenous Continuous Ke Rico MD Last Rate: 75 mL/hr (05/03/18 0912)   sodium chloride 3 mL Nebulization Q6H PRN Amanda Pittman MD    vancomycin 1,000 mg Intravenous Q12H Amanda Pittman MD Last Rate: 1,000 mg (05/03/18 1404)         No Known Allergies      Vitals:   /62 (BP Location: Left arm)   Pulse 105   Temp 97 9 °F (36 6 °C) (Temporal)   Resp (!) 28   Ht 5' 6" (1 676 m)   Wt 42 5 kg (93 lb 11 1 oz)   SpO2 95%   BMI 15 12 kg/m²   Body mass index is 15 12 kg/m²    SpO2: 95 %,   SpO2 Activity: At Rest,   O2 Device: Nasal cannula      Intake/Output Summary (Last 24 hours) at 05/03/18 1719  Last data filed at 05/03/18 1451   Gross per 24 hour   Intake          2539 21 ml   Output             1850 ml   Net           689 21 ml     Invasive Devices     Peripherally Inserted Central Catheter Line PICC Line 05/02/18 1 day          Peripheral Intravenous Line            Peripheral IV 05/01/18 Right Antecubital 2 days          Drain            Urethral Catheter 16 Fr  1 day                Physical Exam:  Gen:  He is up in a chair  Appears comfortable  He is on nasal cannula oxygen  He has a significant thoracic kyphosis  HEENT:  Pupils reactive  Oropharynx with poor dentition  Neck:  No JVD or adenopathy  Chest:  Limited chest wall excursion due to kyphosis  He is hyperinflated and hyper resonant  Breath sounds are distant with a few coarse basilar breath sounds  Cor:  Irregular, no murmur  Abd:  Soft, thin, benign  Ext:  No significant edema  Neuro:  Hard of hearing  Nonfocal, generalized weakness  Skin:  Previously documented ulceration of the scrotum but clear providers  No rash      Diagnostic Data:  Lab: I have personally reviewed pertinent lab results  ,   CBC:    Results from last 7 days  Lab Units 05/03/18  0523   WBC Thousand/uL 17 18*   HEMOGLOBIN g/dL 12 4   HEMATOCRIT % 37 4   PLATELETS Thousands/uL 231      CMP:   Lab Results   Component Value Date     05/03/2018    K 3 8 05/03/2018     05/03/2018    CO2 32 05/03/2018    ANIONGAP 3 (L) 05/03/2018    BUN 23 05/03/2018    CREATININE 0 76 05/03/2018    GLUCOSE 187 (H) 05/03/2018    CALCIUM 8 5 05/03/2018    EGFR 90 05/03/2018   ,     Microbiology:    Results from last 7 days  Lab Units 05/01/18  1151 05/01/18  1103 05/01/18  0234 05/01/18  0227 05/01/18  0218   BLOOD CULTURE   --   --  No Growth at 48 hrs   No Growth at 48 hrs   --    GRAM STAIN RESULT   --   --   --   --  1+ Polys  1+ Gram positive cocci in clusters   URINE CULTURE  No Growth <1000 cfu/mL  --   --   --   --    WOUND CULTURE   --   --   --   --  3+ Growth of Methicillin Resistant Staphylococcus aureus*  3+ Growth of Gram Negative Ted*  3+ Growth of Serratia marcescens*   INFLUENZA A PCR   --  None Detected  --   --   --    INFLUENZA B PCR   --  None Detected  --   --   --    RSV PCR   --  None Detected  --   --   --        Imaging: I have personally reviewed the pertinent imaging studies on the PACS system  Chest x-ray was viewed personally on the [facility] system  There is hyperinflation consistent with COPD  There is mild interstitial edema on the admitting film which remains present on the 05/02/2018 fill    CT angiogram for pulmonary embolism was viewed on the [facility] system and did not show any evidence of pulmonary embolism  There is a 9 mm left upper lobe nodule on a 5 mm right upper lobe nodule which have not changed from prior evaluations  There are severe emphysematous changes      Cardiac/EKG/telemetry/Echo:     Results from last 7 days  Lab Units 05/02/18  1249 05/02/18  1011 05/02/18  0442   TROPONIN I ng/mL 2 66* 2 58* 2 88*   NT-PRO BNP pg/mL  --   --  9,093*     Echocardiogram shows an ejection fraction of 55% with moderate aortic stenosis  Right ventricular function was normal  Estimated pulmonary artery pressure 25 mm of mercury      VTE Prophylaxis: Heparin    Code Status: Level 3 - DNAR and DNI    Ahmet Car MD    Portions of the record may have been created with voice recognition software  Occasional wrong word or "sound a like" substitutions may have occurred due to the inherent limitations of voice recognition software  Read the chart carefully and recognize, using context, where substitutions have occurred

## 2018-05-03 NOTE — ASSESSMENT & PLAN NOTE
Most likely type 2 secondary to hypotension  Patient started on heparin drip, will plan to d/c   Cardiology input appreciated

## 2018-05-03 NOTE — NURSING NOTE
Dr Naman Hernandez made aware that PTT result was 54 sec,pt was given the 1200 unit iv bolus of heparin & the gtt is to increased by 2 units/kg/hr but the gtt rate is maxed out at 20 units/kg/hr;he wants the rate to stay at 20 units/kg/hr & to repeat PTT in 6 hrs

## 2018-05-03 NOTE — PHYSICAL THERAPY NOTE
PT Evaluation     05/03/18 9710   Note Type   Note type Eval/Treat   Pain Assessment   Pain Assessment No/denies pain   Pain Score No Pain   Home Living   Type of [address] One level; Able to live on main level with bedroom/bathroom; Performs ADLs on one level   Bathroom Shower/Tub Walk-in shower   Bathroom Toilet Standard   Bathroom Equipment Grab bars in shower; Shower chair;Grab bars around toilet   [address] Walker;Cane;Quad cane   Prior Function   Level of [place] Independent with ADLs and functional mobility  ((I) with ambulation with quad cane)   Lives With Friend(s)   Receives Help From Friend(s)   ADL Assistance Needs assistance  (assist with dressing and bathing)   IADLs Needs assistance   Falls in the last 6 months 1 to 4   Comments pt does not drive   Restrictions/Precautions   Other Precautions Chair Alarm; Bed Alarm;Multiple lines;Telemetry;O2;Fall Risk   General   Family/Caregiver Present No   Cognition   Arousal/Participation Alert   Orientation Level Oriented to person;Oriented to place;Oriented to situation   Following Commands Follows one step commands without difficulty   RLE Assessment   RLE Assessment WFL  (knee ext limited, 4/5 through available ROM)   LLE Assessment   LLE Assessment WFL  (knee ext limited, 4-/5 throughout)   Coordination   Sensation [facility]   Light Touch   RLE Light Touch Grossly intact   LLE Light Touch Grossly intact   Bed Mobility   Supine to Sit 4  Minimal assistance   Additional items Assist x 1;Verbal cues; Bedrails; Increased time required   Sit to Supine (seated in chair at bedside, alarm on, bell in reach)   Additional Comments Pt is on 2L's O2 with Spo2 97%  /77 at rest  With transfers to [facility] and then to chair SpO2 96-98%  on 2L's   Transfers   Sit to Stand 4  Minimal assistance   Additional items Assist x 1;Bedrails;Verbal cues  (with SPC)   Stand to Sit 4  Minimal assistance   Additional items Assist x 1;Verbal cues;Armrests  (with SPC)   Stand pivot 4  Minimal assistance   Additional items Assist x 1;Verbal cues  (with SPC)   Additional Comments Pt using SPC for transfers short distance ambulation and stand pivot transfer with mild unsteadiness and increased fall risk  Pt with limited mobility also due to multiple lines and equipment attached  Ambulation/Elevation   Gait pattern Decreased foot clearance; Short stride; Step to   Gait Assistance 4  Minimal assist   Additional items Assist x 1   Assistive Device Straight cane   Distance 3ft with SPC min(A)x1 no drop in SpO2 on 2L's with decreased endurance and ambulation tolerance  Pt with multiple attachments lines and equipment limiting ambulation  Balance   Static Sitting Good   Dynamic Sitting Fair +   Static Standing Fair  (with SPC)   Dynamic Standing Fair -  (with SPC)   Ambulatory Fair -  (with SPC)   Endurance Deficit   Endurance Deficit Yes   Endurance Deficit Description limited ambulation and activity tolerance due to fatigue and multiple coughing episodes   Activity Tolerance   Activity Tolerance Patient limited by fatigue   Assessment   Prognosis Good   Problem List Decreased strength;Decreased endurance;Decreased range of motion; Impaired balance;Decreased mobility; Decreased safety awareness   Assessment Pt is a 74year old male presenting with decreased ROM strength balance endurance and functional mobility  Pt with limited knee extension bilaterally and with decreased L LE strength and coordination from previous stroke ambulating with altered gait pattern  Pt is requiring min(A) for all functional mobility with decreased endurance and increased risk for falls  Pt is in need of continued activity in PT to improve impairments and functional deficits with return to (S) to (I) LOF     Goals   Patient Goals To feel better and return home   LTG Expiration Date 05/17/18   Long Term Goal #1 (I) with all bed mobility and will perform all transfers with Supervision with SPC   Long Term Goal #2 (S) with ambulation 150ft with SpC no LOB or drop in SpO2  Pt will also ascend/descend 4 steps with HR (S) no LOB   Plan   Treatment/Interventions Functional transfer training;LE strengthening/ROM; Elevations; Therapeutic exercise; Endurance training;Patient/family training;Bed mobility;Gait training   PT Frequency (3-5x/wk)   Recommendation   Recommendation Short-term skilled PT; Home PT  (based on progress in PT)   PT - OK to Discharge No   Pt with SCD's on when PT entered room  SCD's reapplied and turned on with pt seated in chair at bedside with call bell in reach and chair alarm on

## 2018-05-03 NOTE — PROGRESS NOTES
Progress Note - Lesly Silvestre 1945, 74 y o  male MRN: [id number]    Unit/Bed#:  Encounter: [id number]    Primary Care Provider: Sakina Carrera PA-C   Date and time admitted to hospital: 5/1/2018  1:16 AM        * Septic shock (HCC)   Assessment & Plan    Fever 102, leukocytosis, tachycardia, tachypnea, lactic acidosis  S/p PICC line placement yesterday  Patient was started on Levophed yesterday with resolution of shock, Levophed d/c'ed and will observe off pressors  Unclear source, suspected secondary to tracheobronchitis, less likely scrotal wound as wound is superficial  Continue empiric treatment with cefepime and vancomycin  ID consulted, input appreciated    Nursing staff and I discussed with patient Code status in detail  The patient understands his poor prognosis and chose to make his code status DNR  All questions were answered  Changes in chart made          COPD with acute exacerbation ([facility] [address] )   Assessment & Plan    Improved  Continue Solu-Medrol  Respiratory protocol  Continue bipap as tolerated  Pulmonology consulted        NSTEMI (non-ST elevated myocardial infarction) [facility])   Assessment & Plan    Most likely type 2 secondary to hypotension  Patient started on heparin drip, will plan to d/c   Cardiology input appreciated          Acute encephalopathy   Assessment & Plan    Most likely multifactorial secondary to acute respiratory failure and septic shock, resolved  Slightly improved with BiPAP, patient sitting up in chair, completed most of his breakfast, able to communicate - noted to be a poor historian  Continue treatment of underlying conditions        Chronic diastolic congestive heart failure ([facility] [address] )   Assessment & Plan    With acute respiratory failure  Evidence of vascular congestion on chest x-ray  Cardiology consulted  Monitor daily weights, intake and output        Scrotal abscess   Assessment & Plan    Scrotal wound with tunneling noted on left lateral aspect of left testicle  Urology input appreciated, Urology evaluated and signed off  Proceed with wound care  Continue with cefepime and vancomycin  Infectious Disease consulted        Severe protein-calorie malnutrition ([address] )   Assessment & Plan    Malnutrition Findings:   Malnutrition type: Chronic illness  Degree of Malnutrition: Other severe protein calorie malnutrition   Multiple chronic conditions including COPD, CHF, tobacco abuse    BMI Findings:  BMI Classifications: Underweight < 18 5     Body mass index is 15 12 kg/m²  Will obtain nutrition consult  Noted that patient is with good oral intake, will therefore explore social issues         Tobacco abuse   Assessment & Plan     cessation  Will offer nicotine patch          VTE Pharmacologic Prophylaxis:   Pharmacologic: Heparin Drip  Mechanical VTE Prophylaxis in Place: Yes    Patient Centered Rounds: I have performed bedside rounds with nursing staff today  Discussions with Specialists or Other Care Team Provider: Infectious Disease    Education and Discussions with Family / Patient: Patient    Time Spent for Care: 45 minutes  More than 50% of total time spent on counseling and coordination of care as described above  Current Length of Stay: 2 day(s)    Current Patient Status: Inpatient   Certification Statement: The patient will continue to require additional inpatient hospital stay due to need for ICU monitroing, IV drips    Discharge Plan: TBD    Code Status: Level 1 - Full Code      Subjective:   Patient seen and examined sitting up in chair and having breakfast   He consumed most of his meal   Reports feeling better  He is noted for productive cough but does not appear in distress  No overnight events  Has been on Levophed drip since yesterday afternoon      Objective:     Vitals:   Temp (24hrs), Av 2 °F (36 8 °C), Min:97 4 °F (36 3 °C), Max:98 6 °F (37 °C)    HR:  [] 102  Resp:  [16-40] 21  BP: ()/(52-81) 128/81  SpO2:  [94 %-98 %] 96 %  Body mass index is 15 12 kg/m²  Input and Output Summary (last 24 hours): Intake/Output Summary (Last 24 hours) at 05/03/18 1039  Last data filed at 05/03/18 0911   Gross per 24 hour   Intake          3267 96 ml   Output              850 ml   Net          2417 96 ml       Physical Exam:     Physical Exam   Constitutional: He is oriented to person, place, and time  He appears cachectic  HENT:   Head: Normocephalic and atraumatic  Eyes: Conjunctivae are normal    Neck: No JVD present  Cardiovascular: Tachycardia present  No murmur heard  Pulmonary/Chest: Effort normal  No respiratory distress  He has decreased breath sounds  He has rales  Abdominal: Soft  He exhibits no distension  There is no tenderness  There is no guarding  Musculoskeletal: He exhibits no edema  Neurological: He is alert and oriented to person, place, and time  Hearing impairment   Skin: Skin is warm and dry  Psychiatric: He is slowed  Additional Data:     Labs:      Results from last 7 days  Lab Units 05/03/18  0523  05/01/18  0227   WBC Thousand/uL 17 18*  < > 14 41*   HEMOGLOBIN g/dL 12 4  < > 15 9   HEMATOCRIT % 37 4  < > 48 6   PLATELETS Thousands/uL 231  < > 243   NEUTROS PCT %  --   --  86*   LYMPHS PCT %  --   --  4*   LYMPHO PCT % 0*  < >  --    MONOS PCT %  --   --  10   MONO PCT MAN % 6  < >  --    EOS PCT %  --   --  0   EOSINO PCT MANUAL % 0  < >  --    < > = values in this interval not displayed      Results from last 7 days  Lab Units 05/03/18  0524 05/02/18  0442   SODIUM mmol/L 139 139   POTASSIUM mmol/L 3 8 4 0   CHLORIDE mmol/L 104 104   CO2 mmol/L 32 30   BUN mg/dL 23 23   CREATININE mg/dL 0 76 0 78   CALCIUM mg/dL 8 5 8 7   TOTAL PROTEIN g/dL  --  5 6*   BILIRUBIN TOTAL mg/dL  --  0 50   ALK PHOS U/L  --  56   ALT U/L  --  15   AST U/L  --  27   GLUCOSE RANDOM mg/dL 187* 127       Results from last 7 days  Lab Units 05/02/18  0442   INR  1 17*                 * I Have Reviewed All Lab Data Listed Above  * Additional Pertinent Lab Tests Reviewed: [address] Admission Reviewed    Imaging:    Imaging Reports Reviewed Today Include: echo    Recent Cultures (last 7 days):       Results from last 7 days  Lab Units 05/01/18  1151 05/01/18  1103 05/01/18  0234 05/01/18  0227 05/01/18  8103   BLOOD CULTURE   --   --  No Growth at 48 hrs   No Growth at 48 hrs   --    GRAM STAIN RESULT   --   --   --   --  1+ Polys  1+ Gram positive cocci in clusters   URINE CULTURE  No Growth <1000 cfu/mL  --   --   --   --    WOUND CULTURE   --   --   --   --  3+ Growth of Staphylococcus aureus*  3+ Growth of Gram Negative Ted*  3+ Growth of Serratia marcescens*   INFLUENZA A PCR   --  None Detected  --   --   --    INFLUENZA B PCR   --  None Detected  --   --   --    RSV PCR   --  None Detected  --   --   --        Last 24 Hours Medication List:     Current Facility-Administered Medications:  acetaminophen 650 mg Rectal Q6H PRN Tiera Castañeda MD    aspirin 325 mg Oral Daily Lico Fernandez MD    cefepime 2,000 mg Intravenous Q12H Tiera Castañeda MD Last Rate: 2,000 mg (05/02/18 9257)   fluticasone-salmeterol 1 puff Inhalation Q12H [address] Christiana Schroeder MD    heparin (porcine) 3-20 Units/kg/hr (Order-Specific) Intravenous Titrated Lico Fernandez MD Last Rate: 18 Units/kg/hr (05/03/18 0303)   heparin (porcine) 1,200 Units Intravenous PRN Lico Fernandez MD    heparin (porcine) 2,400 Units Intravenous PRN Lico Fernandez MD    ipratropium 0 5 mg Nebulization TID Tiera Castañeda MD    levalbuterol 1 25 mg Nebulization TID Tiera Castañeda MD    levalbuterol 1 25 mg Nebulization Q6H PRN Christiana Schroeder MD    LORazepam 0 5 mg Intravenous Q4H PRN Tiera Castañeda MD    methylPREDNISolone sodium succinate 40 mg Intravenous Q8H [address] Christiana Schroeder MD    morphine injection 2 mg Intravenous Q3H PRN Lico Fernandez MD    nicotine 21 mg Transdermal Daily Christiana Schroeder MD    nitroglycerin 0 4 mg Sublingual Q5 Min PRN Sincere Guzmán MD    norepinephrine 1-30 mcg/min Intravenous Titrated Ed MD Bridger Last Rate: Stopped (05/03/18 0924)   sodium chloride 75 mL/hr Intravenous Continuous Sincere Guzmán MD Last Rate: 75 mL/hr (05/03/18 0912)   sodium chloride 3 mL Nebulization Q6H PRN Ed MD Bridger    vancomycin 500 mg Intravenous Q12H Ed MD Bridger Last Rate: 500 mg (05/03/18 0008)        Today, Patient Was Seen By: Tobias Dalal MD    ** Please Note: Dictation voice to text software may have been used in the creation of this document   **

## 2018-05-03 NOTE — PLAN OF CARE
Problem: PAIN - ADULT  Goal: Verbalizes/displays adequate comfort level or baseline comfort level  Interventions:  - Encourage patient to monitor pain and request assistance  - Assess pain using appropriate pain scale  - Administer analgesics based on type and severity of pain and evaluate response  - Implement non-pharmacological measures as appropriate and evaluate response  - Consider cultural and social influences on pain and pain management  - Notify physician/advanced practitioner if interventions unsuccessful or patient reports new pain   Outcome: Progressing      Problem: INFECTION - ADULT  Goal: Absence or prevention of progression during hospitalization  INTERVENTIONS:  - Assess and monitor for signs and symptoms of infection  - Monitor lab/diagnostic results  - Monitor all insertion sites, i e  indwelling lines, tubes, and drains  - Monitor endotracheal (as able) and nasal secretions for changes in amount and color  - [place] appropriate cooling/warming therapies per order  - Administer medications as ordered  - Instruct and encourage patient and family to use good hand hygiene technique  - Identify and instruct in appropriate isolation precautions for identified infection/condition   Outcome: Progressing    Goal: Absence of fever/infection during neutropenic period  INTERVENTIONS:  - Monitor WBC  - Implement neutropenic guidelines   Outcome: Progressing      Problem: SAFETY ADULT  Goal: Patient will remain free of falls  INTERVENTIONS:  - Assess patient frequently for physical needs  -  Identify cognitive and physical deficits and behaviors that affect risk of falls    -  [place] fall precautions as indicated by assessment   - Educate patient/family on patient safety including physical limitations  - Instruct patient to call for assistance with activity based on assessment  - Modify environment to reduce risk of injury  - Consider OT/PT consult to assist with strengthening/mobility    Outcome: Progressing    Goal: Maintain or return to baseline ADL function  INTERVENTIONS:  -  Assess patient's ability to carry out ADLs; assess patient's baseline for ADL function and identify physical deficits which impact ability to perform ADLs (bathing, care of mouth/teeth, toileting, grooming, dressing, etc )  - Assess/evaluate cause of self-care deficits   - Assess range of motion  - Assess patient's mobility; develop plan if impaired  - Assess patient's need for assistive devices and provide as appropriate  - Encourage maximum independence but intervene and supervise when necessary  ¯ Involve family in performance of ADLs  ¯ Assess for home care needs following discharge   ¯ Request OT consult to assist with ADL evaluation and planning for discharge  ¯ Provide patient education as appropriate   Outcome: Progressing    Goal: Maintain or return mobility status to optimal level  INTERVENTIONS:  - Assess patient's baseline mobility status (ambulation, transfers, stairs, etc )    - Identify cognitive and physical deficits and behaviors that affect mobility  - Identify mobility aids required to assist with transfers and/or ambulation (gait belt, sit-to-stand, lift, walker, cane, etc )  - [place] fall precautions as indicated by assessment  - Record patient progress and toleration of activity level on Mobility SBAR; progress patient to next Phase/Stage  - Instruct patient to call for assistance with activity based on assessment  - Request Rehabilitation consult to assist with strengthening/weightbearing, etc    Outcome: Progressing      Problem: DISCHARGE PLANNING  Goal: Discharge to home or other facility with appropriate resources  INTERVENTIONS:  - Identify barriers to discharge w/patient and caregiver  - Arrange for needed discharge resources and transportation as appropriate  - Identify discharge learning needs (meds, wound care, etc )  - Arrange for interpretive services to assist at discharge as needed  - Refer to Case Management Department for coordinating discharge planning if the patient needs post-hospital services based on physician/advanced practitioner order or complex needs related to functional status, cognitive ability, or social support system   Outcome: Progressing      Problem: Knowledge Deficit  Goal: Patient/family/caregiver demonstrates understanding of disease process, treatment plan, medications, and discharge instructions  Complete learning assessment and assess knowledge base  Interventions:  - Provide teaching at level of understanding  - Provide teaching via preferred learning methods   Outcome: Progressing      Problem: Nutrition/Hydration-ADULT  Goal: Nutrient/Hydration intake appropriate for improving, restoring or maintaining nutritional needs  Monitor and assess patient's nutrition/hydration status for malnutrition (ex- brittle hair, bruises, dry skin, pale skin and conjunctiva, muscle wasting, smooth red tongue, and disorientation)  Collaborate with interdisciplinary team and initiate plan and interventions as ordered  Monitor patient's weight and dietary intake as ordered or per policy  Utilize nutrition screening tool and intervene per policy  Determine patient's food preferences and provide high-protein, high-caloric foods as appropriate       INTERVENTIONS:  - Monitor oral intake, urinary output, labs, and treatment plans  - Assess nutrition and hydration status and recommend course of action  - Evaluate amount of meals eaten  - Assist patient with eating if necessary   - Allow adequate time for meals  - Recommend/ encourage appropriate diets, oral nutritional supplements, and vitamin/mineral supplements  - Order, calculate, and assess calorie counts as needed  - Recommend, monitor, and adjust tube feedings and TPN/PPN based on assessed needs  - Assess need for intravenous fluids  - Provide specific nutrition/hydration education as appropriate  - Include patient/family/caregiver in decisions related to nutrition    Outcome: Progressing      Problem: CARDIOVASCULAR - ADULT  Goal: Maintains optimal cardiac output and hemodynamic stability  INTERVENTIONS:  - Monitor I/O, vital signs and rhythm  - Monitor for S/S and trends of decreased cardiac output i e  bleeding, hypotension  - Administer and titrate ordered vasoactive medications to optimize hemodynamic stability  - Assess quality of pulses, skin color and temperature  - Assess for signs of decreased coronary artery perfusion - ex   Angina  - Instruct patient to report change in severity of symptoms   Outcome: Progressing    Goal: Absence of cardiac dysrhythmias or at baseline rhythm  INTERVENTIONS:  - Continuous cardiac monitoring, monitor vital signs, obtain 12 lead EKG if indicated  - Administer antiarrhythmic and heart rate control medications as ordered  - Monitor electrolytes and administer replacement therapy as ordered   Outcome: Progressing      Problem: RESPIRATORY - ADULT  Goal: Achieves optimal ventilation and oxygenation  INTERVENTIONS:  - Assess for changes in respiratory status  - Assess for changes in mentation and behavior  - Position to facilitate oxygenation and minimize respiratory effort  - Oxygen administration by appropriate delivery method based on oxygen saturation (per order) or ABGs  - Initiate smoking cessation education as indicated  - Encourage broncho-pulmonary hygiene including cough, deep breathe, Incentive Spirometry  - Assess the need for suctioning and aspirate as needed  - Assess and instruct to report SOB or any respiratory difficulty  - Respiratory Therapy support as indicated   Outcome: Progressing      Problem: METABOLIC, FLUID AND ELECTROLYTES - ADULT  Goal: Electrolytes maintained within normal limits  INTERVENTIONS:  - Monitor labs and assess patient for signs and symptoms of electrolyte imbalances  - Administer electrolyte replacement as ordered  - Monitor response to electrolyte replacements, including repeat lab results as appropriate  - Instruct patient on fluid and nutrition as appropriate   Outcome: Progressing    Goal: Fluid balance maintained  INTERVENTIONS:  - Monitor labs and assess for signs and symptoms of volume excess or deficit  - Monitor I/O and WT  - Instruct patient on fluid and nutrition as appropriate   Outcome: Progressing    Goal: Glucose maintained within target range  INTERVENTIONS:  - Monitor Blood Glucose as ordered  - Assess for signs and symptoms of hyperglycemia and hypoglycemia  - Administer ordered medications to maintain glucose within target range  - Assess nutritional intake and initiate nutrition service referral as needed   Outcome: Progressing      Problem: Potential for Falls  Goal: Patient will remain free of falls  INTERVENTIONS:  - Assess patient frequently for physical needs  -  Identify cognitive and physical deficits and behaviors that affect risk of falls    -  [place] fall precautions as indicated by assessment   - Educate patient/family on patient safety including physical limitations  - Instruct patient to call for assistance with activity based on assessment  - Modify environment to reduce risk of injury  - Consider OT/PT consult to assist with strengthening/mobility    Outcome: Progressing      Problem: Prexisting or High Potential for Compromised Skin Integrity  Goal: Skin integrity is maintained or improved  INTERVENTIONS:  - Identify patients at risk for skin breakdown  - Assess and monitor skin integrity  - Assess and monitor nutrition and hydration status  - Monitor labs (i e  albumin)  - Assess for incontinence   - Turn and reposition patient  - Assist with mobility/ambulation  - Relieve pressure over bony prominences  - Avoid friction and shearing  - Provide appropriate hygiene as needed including keeping skin clean and dry  - Evaluate need for skin moisturizer/barrier cream  - Collaborate with interdisciplinary team (i e  Nutrition, Rehabilitation, etc )   - Patient/family teaching   Outcome: Progressing      Problem: DISCHARGE PLANNING - CARE MANAGEMENT  Goal: Discharge to post-acute care or home with appropriate resources  INTERVENTIONS:  - Conduct assessment to determine patient/family and health care team treatment goals, and need for post-acute services based on payer coverage, community resources, and patient preferences, and barriers to discharge  - Address psychosocial, clinical, and financial barriers to discharge as identified in assessment in conjunction with the patient/family and health care team  - Arrange appropriate level of post-acute services according to patient's   needs and preference and payer coverage in collaboration with the physician and health care team  - Communicate with and update the patient/family, physician, and health care team regarding progress on the discharge plan  - Arrange appropriate transportation to post-acute venues  Pt has aides from [address] living   Pt might benefit from home care on discharge   Outcome: Progressing

## 2018-05-03 NOTE — ASSESSMENT & PLAN NOTE
With acute respiratory failure  Evidence of vascular congestion on chest x-ray  Cardiology consulted  Monitor daily weights, intake and output

## 2018-05-03 NOTE — OCCUPATIONAL THERAPY NOTE
Occupational Therapy Evaluation     Patient Name: Aydin Ralph  TVGVW'K Date: 5/3/2018  Problem List  Patient Active Problem List   Diagnosis    Vitamin D deficiency    COPD with acute exacerbation ([address] )    Septic shock ([facility] )    Acute tracheobronchitis    Tobacco abuse    Low serum prealbumin    BMI less than 19,adult    Severe protein-calorie malnutrition ([facility] )    Hypoalbuminemia    Dysphagia    Microscopic hematuria    Renal cyst, right    Acute on chronic respiratory failure with hypoxia and hypercapnia (HCC)    Pulmonary nodule    Rhinovirus infection    Intractable abdominal pain    Scrotal abscess    Acute on chronic diastolic congestive heart failure (HCC)    Acute encephalopathy    NSTEMI (non-ST elevated myocardial infarction) [facility])     Past Medical History  Past Medical History:   Diagnosis Date    Anemia     Asthma     [facility]chronic obstructive pulmonary disease) ([facility])     Coronary artery disease     Heart disease     Left wrist injury     Low testosterone     Stroke ([facility] )     Vitamin D deficiency      Past Surgical History  History reviewed  No pertinent surgical history  05/03/18 0756   Note Type   Note type Eval/Treat   Restrictions/Precautions   Weight Bearing Precautions Per Order No   Other Precautions Chair Alarm; Bed Alarm;Multiple lines;Telemetry;O2;Fall Risk   Pain Assessment   Pain Assessment No/denies pain   Home Living   Additional Comments see PT evaluation for details    Prior Function   Level of [place] Needs assistance with ADLs and functional mobility; Needs assistance with IADLs   Lives With Friend(s); Family   Receives Help From Family;Friend(s)   ADL Assistance Needs assistance   IADLs Needs assistance   Falls in the last 6 months 1 to 4   Comments pt has significant (A) at home from friends and family; pt does not drives   Psychosocial   Psychosocial (WDL) WDL   Subjective   Subjective I am better now that you are here     ADL   Where Assessed Other (Comment)  ([facility])   Toileting Assistance  2  Maximal Assistance   Toileting Deficit Perineal hygiene; Bedside commode;Grab bar use; Increased time to complete;Supervison/safety   Bed Mobility   Supine to Sit 4  Minimal assistance   Additional items Assist x 1;Bedrails;Verbal cues; Increased time required   Sit to Supine (seated in chair at end of session)   Additional Comments pt's SpO2 prior to FM was 97% on 2L O2; pt with initial posterior lean and able to correct after physical and verbal cues   Transfers   Sit to Stand 4  Minimal assistance   Additional items Assist x 1;Verbal cues  (SPC)   Stand to Sit 4  Minimal assistance   Additional items Assist x 1;Verbal cues  [facility])   Additional Comments pt utilizes quad cane at baseline but utilized [facility] this session; pt has a history of stroke and L UE is flaccid and contraced at hand and digits   Functional Mobility   Functional Mobility 4  Minimal assistance   Additional Comments x1 with SPC to and from [facility] and to chair    Additional items SPC   Balance   Static Sitting Fair +   Dynamic Sitting Fair +   Static Standing Fair   Dynamic Standing Fair -   Ambulatory Fair -   Activity Tolerance   Activity Tolerance Patient limited by fatigue   RUE Assessment   RUE Assessment WFL   LUE Assessment   LUE Assessment X  (shoulder and elbow flexion present )   Hand Function   Gross Motor Coordination Impaired  (L UE)   Fine Motor Coordination Impaired  (L UE)   Sensation   Light Touch Partial deficits in the LUE   Sharp/Dull Partial deficits in the LUE   Cognition   Overall Cognitive Status Impaired   Arousal/Participation Alert   Attention Within functional limits   Orientation Level Oriented to person;Oriented to place;Oriented to situation   Memory Decreased long term memory;Decreased short term memory   Following Commands Follows one step commands without difficulty   Assessment   Limitation Decreased ADL status; Decreased UE strength;Decreased Safe judgement during ADL;Decreased endurance;Decreased cognition;Decreased self-care trans;Decreased high-level ADLs   Assessment pt presents at evaluation performing at overall min (A) level with SPC during FM; pt requires physical and verbal cues for safety and will benefit from continued OT intervention during hospital admission prior to return home; pt has siginificant (A) at home from family and friends   Goals   Short Term Goal  pt will perform R UE strengthening exercises    Long Term Goal #1 pt will perform elena hygiene at min (A) level    Long Term Goal #2 pt will perform FM c SPC at (S) level with decreased cues    Long Term Goal pt will perform UB grooming tasks at min (A) level    Plan   Treatment Interventions ADL retraining;Functional transfer training;UE strengthening/ROM; Endurance training;Patient/family training; Activityengagement   Goal Expiration Date 05/17/18   OT Frequency 3-5x/wk   Recommendation   OT Discharge Recommendation Home with family support   OT - OK to Discharge No   [place] Index   Feeding 5   Bathing 0   Grooming Score 0   Dressing Score 5   Bladder Score 0   Bowels Score 5   Toilet Use Score 5   Transfers (Bed/Chair) Score 10   Mobility (Level Surface) Score 10   Stairs Score 0   [place] Index Score 40       Pt will benefit from continued OT services in order to maximize (I) c ADL performance, FM c SPC, and improve overall endurance/strength required to complete functional tasks in preparation for d/c  Pt left seated in chair at end of session; all needs within reach; all lines intact; scds connected and turned on

## 2018-05-03 NOTE — ASSESSMENT & PLAN NOTE
Improved  Continue Solu-Medrol  Respiratory protocol  Continue bipap as tolerated  Pulmonology consulted

## 2018-05-03 NOTE — ACP (ADVANCE CARE PLANNING)
Freddy Yaan 74 y o  male MRN: [id number]  Unit/Bed#:  Encounter: [id number]    DNR/DNI - I confirmed this with the patient today  We discussed his wishes in the event of worsening status  He does not want mechanical ventilation, CPR, or aggressive heroic interventions  He did confirm these wishes with Dr Haiele Jacobs yesterday as well

## 2018-05-03 NOTE — ASSESSMENT & PLAN NOTE
Fever 102, leukocytosis, tachycardia, tachypnea, lactic acidosis  S/p PICC line placement yesterday  Patient was started on Levophed yesterday with resolution of shock, Levophed d/c'ed and will observe off pressors  Unclear source, suspected secondary to tracheobronchitis, less likely scrotal wound as wound is superficial  Continue empiric treatment with cefepime and vancomycin  ID consulted, input appreciated

## 2018-05-03 NOTE — ASSESSMENT & PLAN NOTE
Scrotal wound with tunneling noted on left lateral aspect of left testicle  Urology input appreciated, Urology evaluated and signed off  Proceed with wound care  Continue with cefepime and vancomycin  Infectious Disease consulted

## 2018-05-03 NOTE — ASSESSMENT & PLAN NOTE
Malnutrition Findings:   Malnutrition type: Chronic illness  Degree of Malnutrition: Other severe protein calorie malnutrition   Multiple chronic conditions including COPD, CHF, tobacco abuse    BMI Findings:  BMI Classifications: Underweight < 18 5     Body mass index is 15 12 kg/m²       Will obtain nutrition consult  Noted that patient is with good oral intake, will therefore explore social issues

## 2018-05-04 ENCOUNTER — APPOINTMENT (INPATIENT)
Dept: RADIOLOGY | Facility: HOSPITAL | Age: 73
DRG: 871 | End: 2018-05-04
Payer: MEDICARE

## 2018-05-04 LAB
ANION GAP SERPL CALCULATED.3IONS-SCNC: 0 MMOL/L (ref 4–13)
ANISOCYTOSIS BLD QL SMEAR: PRESENT
APTT PPP: 62 SECONDS (ref 23–35)
APTT PPP: 64 SECONDS (ref 23–35)
BACTERIA WND AEROBE CULT: ABNORMAL
BASOPHILS # BLD MANUAL: 0 THOUSAND/UL (ref 0–0.1)
BASOPHILS NFR MAR MANUAL: 0 % (ref 0–1)
BUN SERPL-MCNC: 18 MG/DL (ref 5–25)
C DIFF TOX GENS STL QL NAA+PROBE: NORMAL
CALCIUM SERPL-MCNC: 8.6 MG/DL (ref 8.3–10.1)
CHLORIDE SERPL-SCNC: 105 MMOL/L (ref 100–108)
CO2 SERPL-SCNC: 35 MMOL/L (ref 21–32)
CREAT SERPL-MCNC: 0.54 MG/DL (ref 0.6–1.3)
EOSINOPHIL # BLD MANUAL: 0 THOUSAND/UL (ref 0–0.4)
EOSINOPHIL NFR BLD MANUAL: 0 % (ref 0–6)
ERYTHROCYTE [DISTWIDTH] IN BLOOD BY AUTOMATED COUNT: 13.6 % (ref 11.6–15.1)
GFR SERPL CREATININE-BSD FRML MDRD: 103 ML/MIN/1.73SQ M
GLUCOSE SERPL-MCNC: 122 MG/DL (ref 65–140)
GRAM STN SPEC: ABNORMAL
GRAM STN SPEC: ABNORMAL
HCT VFR BLD AUTO: 34.6 % (ref 36.5–49.3)
HGB BLD-MCNC: 11.1 G/DL (ref 12–17)
LYMPHOCYTES # BLD AUTO: 0 % (ref 14–44)
LYMPHOCYTES # BLD AUTO: 0 THOUSAND/UL (ref 0.6–4.47)
MCH RBC QN AUTO: 30.6 PG (ref 26.8–34.3)
MCHC RBC AUTO-ENTMCNC: 32.1 G/DL (ref 31.4–37.4)
MCV RBC AUTO: 95 FL (ref 82–98)
MONOCYTES # BLD AUTO: 0.5 THOUSAND/UL (ref 0–1.22)
MONOCYTES NFR BLD: 4 % (ref 4–12)
NEUTROPHILS # BLD MANUAL: 11.97 THOUSAND/UL (ref 1.85–7.62)
NEUTS BAND NFR BLD MANUAL: 2 % (ref 0–8)
NEUTS SEG NFR BLD AUTO: 94 % (ref 43–75)
PLATELET # BLD AUTO: 189 THOUSANDS/UL (ref 149–390)
PLATELET BLD QL SMEAR: ADEQUATE
PMV BLD AUTO: 10.2 FL (ref 8.9–12.7)
POTASSIUM SERPL-SCNC: 4.2 MMOL/L (ref 3.5–5.3)
RBC # BLD AUTO: 3.63 MILLION/UL (ref 3.88–5.62)
SODIUM SERPL-SCNC: 140 MMOL/L (ref 136–145)
TOTAL CELLS COUNTED SPEC: 100
TROPONIN I SERPL-MCNC: 1.08 NG/ML
WBC # BLD AUTO: 12.47 THOUSAND/UL (ref 4.31–10.16)

## 2018-05-04 PROCEDURE — 87493 C DIFF AMPLIFIED PROBE: CPT | Performed by: PHYSICIAN ASSISTANT

## 2018-05-04 PROCEDURE — 94640 AIRWAY INHALATION TREATMENT: CPT

## 2018-05-04 PROCEDURE — 99233 SBSQ HOSP IP/OBS HIGH 50: CPT | Performed by: FAMILY MEDICINE

## 2018-05-04 PROCEDURE — 85027 COMPLETE CBC AUTOMATED: CPT | Performed by: FAMILY MEDICINE

## 2018-05-04 PROCEDURE — 85007 BL SMEAR W/DIFF WBC COUNT: CPT | Performed by: FAMILY MEDICINE

## 2018-05-04 PROCEDURE — 85730 THROMBOPLASTIN TIME PARTIAL: CPT | Performed by: FAMILY MEDICINE

## 2018-05-04 PROCEDURE — 94760 N-INVAS EAR/PLS OXIMETRY 1: CPT

## 2018-05-04 PROCEDURE — 84484 ASSAY OF TROPONIN QUANT: CPT | Performed by: INTERNAL MEDICINE

## 2018-05-04 PROCEDURE — 71045 X-RAY EXAM CHEST 1 VIEW: CPT

## 2018-05-04 PROCEDURE — 80048 BASIC METABOLIC PNL TOTAL CA: CPT | Performed by: FAMILY MEDICINE

## 2018-05-04 PROCEDURE — 97530 THERAPEUTIC ACTIVITIES: CPT

## 2018-05-04 PROCEDURE — 97116 GAIT TRAINING THERAPY: CPT

## 2018-05-04 PROCEDURE — 94664 DEMO&/EVAL PT USE INHALER: CPT

## 2018-05-04 RX ORDER — GUAIFENESIN/DEXTROMETHORPHAN 100-10MG/5
10 SYRUP ORAL ONCE
Status: COMPLETED | OUTPATIENT
Start: 2018-05-04 | End: 2018-05-04

## 2018-05-04 RX ADMIN — IPRATROPIUM BROMIDE 0.5 MG: 0.5 SOLUTION RESPIRATORY (INHALATION) at 09:15

## 2018-05-04 RX ADMIN — ENOXAPARIN SODIUM 40 MG: 40 INJECTION SUBCUTANEOUS at 09:56

## 2018-05-04 RX ADMIN — GUAIFENESIN AND DEXTROMETHORPHAN 10 ML: 100; 10 SYRUP ORAL at 02:38

## 2018-05-04 RX ADMIN — NICOTINE 21 MG: 21 PATCH, EXTENDED RELEASE TRANSDERMAL at 09:55

## 2018-05-04 RX ADMIN — LEVALBUTEROL 1.25 MG: 1.25 SOLUTION, CONCENTRATE RESPIRATORY (INHALATION) at 19:30

## 2018-05-04 RX ADMIN — FLUTICASONE PROPIONATE AND SALMETEROL 1 PUFF: 50; 250 POWDER RESPIRATORY (INHALATION) at 09:30

## 2018-05-04 RX ADMIN — CEFEPIME HYDROCHLORIDE 2000 MG: 2 INJECTION, SOLUTION INTRAVENOUS at 23:12

## 2018-05-04 RX ADMIN — LEVALBUTEROL 1.25 MG: 1.25 SOLUTION, CONCENTRATE RESPIRATORY (INHALATION) at 09:16

## 2018-05-04 RX ADMIN — LEVALBUTEROL 1.25 MG: 1.25 SOLUTION, CONCENTRATE RESPIRATORY (INHALATION) at 14:44

## 2018-05-04 RX ADMIN — ASPIRIN 325 MG: 325 TABLET, DELAYED RELEASE ORAL at 09:56

## 2018-05-04 RX ADMIN — CEFEPIME HYDROCHLORIDE 2000 MG: 2 INJECTION, SOLUTION INTRAVENOUS at 11:50

## 2018-05-04 RX ADMIN — SODIUM CHLORIDE 75 ML/HR: 0.9 INJECTION, SOLUTION INTRAVENOUS at 05:48

## 2018-05-04 RX ADMIN — METHYLPREDNISOLONE SODIUM SUCCINATE 30 MG: 40 INJECTION, POWDER, FOR SOLUTION INTRAMUSCULAR; INTRAVENOUS at 09:55

## 2018-05-04 RX ADMIN — IPRATROPIUM BROMIDE 0.5 MG: 0.5 SOLUTION RESPIRATORY (INHALATION) at 19:30

## 2018-05-04 RX ADMIN — FLUTICASONE PROPIONATE AND SALMETEROL 1 PUFF: 50; 250 POWDER RESPIRATORY (INHALATION) at 22:12

## 2018-05-04 RX ADMIN — IPRATROPIUM BROMIDE 0.5 MG: 0.5 SOLUTION RESPIRATORY (INHALATION) at 14:44

## 2018-05-04 RX ADMIN — METHYLPREDNISOLONE SODIUM SUCCINATE 30 MG: 40 INJECTION, POWDER, FOR SOLUTION INTRAMUSCULAR; INTRAVENOUS at 22:12

## 2018-05-04 RX ADMIN — VANCOMYCIN HYDROCHLORIDE 1000 MG: 1 INJECTION, POWDER, LYOPHILIZED, FOR SOLUTION INTRAVENOUS at 15:10

## 2018-05-04 RX ADMIN — VANCOMYCIN HYDROCHLORIDE 1000 MG: 1 INJECTION, POWDER, LYOPHILIZED, FOR SOLUTION INTRAVENOUS at 02:27

## 2018-05-04 NOTE — PROGRESS NOTES
Progress Note - Sis Salazar 1945, 74 y o  male MRN: [id number]    Unit/Bed#:  Encounter: [id number]    Primary Care Provider: Jutsen Parker PA-C   Date and time admitted to hospital: 5/1/2018  1:16 AM        Acute on chronic respiratory failure with hypoxia and hypercapnia (HCC)   Assessment & Plan    Likely multifactorial secondary to sepsis as well as acute on chronic severe COPD  Significantly improved  Continue bipap as tolerated   Maintain oxygen saturation above 88%  Respiratory protocol, BiPAP at bedside  Consulted Pulmonology/Critical Care, input appreciated        * Septic shock (HCC)   Assessment & Plan    Fever 102, leukocytosis, tachycardia, tachypnea, lactic acidosis  Resolved  S/p PICC line placement   Levophed discontinued yesterday, continue to observe off pressors  Unclear source, suspected secondary to tracheobronchitis vs scrotal wound   Continue empiric treatment with cefepime and vancomycin  Procalcitonin trending down, continue to follow  ID consulted, input appreciated          COPD with acute exacerbation (HCC)   Assessment & Plan    Improved  Continue Solu-Medrol  Respiratory protocol  Continue bipap as tolerated  Pulmonology consulted, input appreciated        NSTEMI (non-ST elevated myocardial infarction) ([facility] [address] )   Assessment & Plan    Most likely type 2 secondary to hypotension  Troponin trended down   D/c Heparin  Cardiology input appreciated          Acute encephalopathy   Assessment & Plan    Most likely multifactorial secondary to acute respiratory failure and septic shock, resolved  Patient sitting up in chair with improved communication  Continue treatment of underlying conditions        Chronic diastolic congestive heart failure ([facility] [address] )   Assessment & Plan    No evidence of fluid overload on exam  Increase in weight noted but respiratory status improved  Cardiology consulted  Monitor daily weights, intake and output  May add Lasix to regimen if BP will allow Scrotal abscess   Assessment & Plan    Scrotal wound with tunneling noted on left lateral aspect of left testicle  Urology input appreciated, Urology evaluated and signed off  Wound cx noted for MRSA and Serratia marcescens  Continue with cefepime and vancomycin  Infectious Disease consulted        Severe protein-calorie malnutrition ([address] )   Assessment & Plan    Malnutrition Findings:   Malnutrition type: Chronic illness  Degree of Malnutrition: Other severe protein calorie malnutrition   Multiple chronic conditions including COPD, CHF, tobacco abuse    BMI Findings:  BMI Classifications: Underweight < 18 5     Body mass index is 15 73 kg/m²  Nutrition consult  Noted that patient is with good oral intake here, will therefore explore social issues at home        Tobacco abuse   Assessment & Plan     cessation  Will offer nicotine patch          VTE Pharmacologic Prophylaxis:   Pharmacologic: Enoxaparin (Lovenox)  Mechanical VTE Prophylaxis in Place: Yes    Patient Centered Rounds: I have performed bedside rounds with nursing staff today  Discussions with Specialists or Other Care Team Provider: Pulmonology, Case management    Education and Discussions with Family / Patient: patient     Time Spent for Care: 45 minutes  More than 50% of total time spent on counseling and coordination of care as described above  Current Length of Stay: 3 day(s)    Current Patient Status: Inpatient   Certification Statement: The patient will continue to require additional inpatient hospital stay due to need for close monitoring, IV management    Discharge Plan: TBD    Code Status: Level 3 - DNAR and DNI      Subjective:   Patient seen and examined  He states he feels "good"  He states his breathing is improved  He has been afebrile  Denies any pain  Reports good appetite   No o/n events    Objective:     Vitals:   Temp (24hrs), Av 7 °F (36 5 °C), Min:97 °F (36 1 °C), Max:98 5 °F (36 9 °C)    HR:  [] 74  Resp:  [15-37] 15  BP: ()/(54-76) 90/55  SpO2:  [94 %-99 %] 98 %  Body mass index is 15 73 kg/m²  Input and Output Summary (last 24 hours): Intake/Output Summary (Last 24 hours) at 05/04/18 1411  Last data filed at 05/04/18 1219   Gross per 24 hour   Intake          3081 87 ml   Output             2025 ml   Net          1056 87 ml       Physical Exam:     Physical Exam   Constitutional: He is oriented to person, place, and time  He appears cachectic  No distress  HENT:   Head: Normocephalic and atraumatic  Eyes: Conjunctivae are normal    Neck: No JVD present  Cardiovascular: Normal rate and regular rhythm  No murmur heard  Pulmonary/Chest: Effort normal  No respiratory distress  He has decreased breath sounds  He has rales  Abdominal: Soft  He exhibits no distension  There is no tenderness  There is no guarding  Musculoskeletal: He exhibits no edema  Neurological: He is alert and oriented to person, place, and time  Skin: Skin is warm and dry  Psychiatric: He has a normal mood and affect  Additional Data:     Labs:      Results from last 7 days  Lab Units 05/04/18  0541  05/01/18  0227   WBC Thousand/uL 12 47*  < > 14 41*   HEMOGLOBIN g/dL 11 1*  < > 15 9   HEMATOCRIT % 34 6*  < > 48 6   PLATELETS Thousands/uL 189  < > 243   NEUTROS PCT %  --   --  86*   LYMPHS PCT %  --   --  4*   LYMPHO PCT % 0*  < >  --    MONOS PCT %  --   --  10   MONO PCT MAN % 4  < >  --    EOS PCT %  --   --  0   EOSINO PCT MANUAL % 0  < >  --    < > = values in this interval not displayed      Results from last 7 days  Lab Units 05/04/18  0541  05/02/18  0442   SODIUM mmol/L 140  < > 139   POTASSIUM mmol/L 4 2  < > 4 0   CHLORIDE mmol/L 105  < > 104   CO2 mmol/L 35*  < > 30   BUN mg/dL 18  < > 23   CREATININE mg/dL 0 54*  < > 0 78   CALCIUM mg/dL 8 6  < > 8 7   TOTAL PROTEIN g/dL  --   --  5 6*   BILIRUBIN TOTAL mg/dL  --   --  0 50   ALK PHOS U/L  --   --  56   ALT U/L  --   --  15   AST U/L --   --  27   GLUCOSE RANDOM mg/dL 122  < > 127   < > = values in this interval not displayed  Results from last 7 days  Lab Units 05/02/18  0442   INR  1 17*                 * I Have Reviewed All Lab Data Listed Above  * Additional Pertinent Lab Tests Reviewed: [address] Admission Reviewed    Imaging:    Imaging Reports Reviewed Today Include: None today, will obtain chest xray    Recent Cultures (last 7 days):       Results from last 7 days  Lab Units 05/03/18  1638 05/01/18  1151 05/01/18  1103 05/01/18  0234 05/01/18  0227 05/01/18  0218   BLOOD CULTURE   --   --   --  No Growth at 72 hrs   No Growth at 72 hrs   --    GRAM STAIN RESULT  1+ Polys  2+ Budding yeast  --   --   --   --  1+ Polys  1+ Gram positive cocci in clusters   URINE CULTURE   --  No Growth <1000 cfu/mL  --   --   --   --    WOUND CULTURE   --   --   --   --   --  3+ Growth of Methicillin Resistant Staphylococcus aureus*  3+ Growth of Gram Negative Ted*  3+ Growth of Serratia marcescens*   INFLUENZA A PCR   --   --  None Detected  --   --   --    INFLUENZA B PCR   --   --  None Detected  --   --   --    RSV PCR   --   --  None Detected  --   --   --        Last 24 Hours Medication List:     Current Facility-Administered Medications:  acetaminophen 650 mg Rectal Q6H PRN Cindy Moore MD    aspirin 325 mg Oral Daily Brooke Anderson MD    cefepime 2,000 mg Intravenous Q12H Cindy Moore MD Last Rate: 2,000 mg (05/03/18 4862)   enoxaparin 40 mg Subcutaneous Q24H [address] Christiana Schroeder MD    fluticasone-salmeterol 1 puff Inhalation Q12H [address] Christiana Schroeder MD    ipratropium 0 5 mg Nebulization TID Cindy Moore MD    levalbuterol 1 25 mg Nebulization TID Cindy Moore MD    levalbuterol 1 25 mg Nebulization Q6H PRN Christiana Schroeder MD    LORazepam 0 5 mg Intravenous Q4H PRN Cindy Moore MD    methylPREDNISolone sodium succinate 30 mg Intravenous Q12H Raghu Fuller MD    morphine injection 2 mg Intravenous Q3H PRN Tank Hinojosa MD    nicotine 21 mg Transdermal Daily Ritesh Pack MD    nitroglycerin 0 4 mg Sublingual Q5 Min PRN Tank Hinojosa MD    sodium chloride 3 mL Nebulization Q6H PRN Ritesh Pack MD    vancomycin 1,000 mg Intravenous Q12H Ritesh Pack MD Last Rate: 1,000 mg (05/04/18 0227)        Today, Patient Was Seen By: Gerard Hinds MD    ** Please Note: Dictation voice to text software may have been used in the creation of this document   **

## 2018-05-04 NOTE — OCCUPATIONAL THERAPY NOTE
OT Note     05/04/18 0820   General   Missed Treatment Reason Patient refusal   Assessment   Assessment Attempted see pt  this a m  Pt  states "its too early in the morning  I did'nt sleep all night    I'm tired "   Recommendation   Recommendation (Contin)

## 2018-05-04 NOTE — ASSESSMENT & PLAN NOTE
Improved  Continue Solu-Medrol  Respiratory protocol  Continue bipap as tolerated  Pulmonology consulted, input appreciated

## 2018-05-04 NOTE — PHYSICAL THERAPY NOTE
PT treatment Note      05/04/18 1121   Restrictions/Precautions   Other Precautions (Chair Alarm; Bed Alarm;Multiple lines;Telemetry;O2;Fall Risk)   Subjective   Subjective States he didn't sleep last night  Pt  was agreeable to get up later in the AM  after refusing earlier  Bed Mobility   Supine to Sit 4  Minimal assistance   Additional items Assist x 1;HOB elevated; Increased time required;Verbal cues   Transfers   Sit to Stand 4  Minimal assistance   Additional items Assist x 1;Verbal cues   Stand to Sit 4  Minimal assistance   Additional items Assist x 1;Verbal cues   Stand pivot 4  Minimal assistance   Ambulation/Elevation   Gait pattern (Decreased foot clearance; Short stride; Step to)   Gait Assistance (CGA)   Additional items Assist x 1   Assistive Device Straight cane   Distance 50'  SpO2 mid 90's with 2 L, HR  with exersion  No SOB   Balance   Static Sitting Good   Dynamic Sitting Fair +   Static Standing Fair   Dynamic Standing Fair   Ambulatory Fair  (with SPC)   Endurance Deficit   Endurance Deficit Yes   Activity Tolerance   Activity Tolerance Patient limited by fatigue   Assessment   Prognosis Good   Problem List Decreased strength;Decreased endurance; Impaired balance;Decreased mobility; Decreased safety awareness   Assessment Performing funcitonal mobility at (Min-CGA) level of function  Fair stability with cane  Decreased endurance  Pt is in need of continued activity in PT to improve impairments and functional deficits with return to (S) to (I) LOF  Plan   Treatment/Interventions Functional transfer training;LE strengthening/ROM; Therapeutic exercise; Endurance training;Bed mobility;Gait training   Progress Progressing toward goals   Recommendation   Recommendation Short-term skilled PT; Home PT   Pt  OOB with call bell within reach, scd's connected and turned on and alarm on at end of PT session

## 2018-05-04 NOTE — ASSESSMENT & PLAN NOTE
No evidence of fluid overload on exam  Increase in weight noted but respiratory status improved  Cardiology consulted  Monitor daily weights, intake and output  May add Lasix to regimen if BP will allow

## 2018-05-04 NOTE — ASSESSMENT & PLAN NOTE
Likely multifactorial secondary to sepsis as well as acute on chronic severe COPD  Significantly improved  Continue bipap as tolerated   Maintain oxygen saturation above 88%  Respiratory protocol, BiPAP at bedside  Consulted Pulmonology/Critical Care, input appreciated

## 2018-05-04 NOTE — NURSING NOTE
Patient had an uneventful night  Patient was awake most of night watching TV, no s/s respiratory distress

## 2018-05-04 NOTE — ASSESSMENT & PLAN NOTE
Fever 102, leukocytosis, tachycardia, tachypnea, lactic acidosis  Resolved  S/p PICC line placement   Levophed discontinued yesterday, continue to observe off pressors  Unclear source, suspected secondary to tracheobronchitis vs scrotal wound   Continue empiric treatment with cefepime and vancomycin  Procalcitonin trending down, continue to follow  ID consulted, input appreciated

## 2018-05-04 NOTE — ASSESSMENT & PLAN NOTE
Most likely type 2 secondary to hypotension  Troponin trended down   D/c Heparin  Cardiology input appreciated

## 2018-05-04 NOTE — RESPIRATORY THERAPY NOTE
RT Protocol Note  Robbie Mccabea 74 y o  male MRN: [id number]  Unit/Bed#:  Encounter: [id number]    Assessment    Principal Problem:    Septic shock ([place] [address] )  Active Problems:    COPD with acute exacerbation ([place] [address] )    Tobacco abuse    Severe protein-calorie malnutrition ([place] [address] )    Acute on chronic respiratory failure with hypoxia and hypercapnia (HCC)    Scrotal abscess    Chronic diastolic congestive heart failure (HCC)    Acute encephalopathy    NSTEMI (non-ST elevated myocardial infarction) ([place] [address] )      Home Pulmonary Medications:    Home Devices/Therapy: Home O2, Other (Comment)    Past Medical History:   Diagnosis Date    Anemia     Asthma     COPD (chronic obstructive pulmonary disease) ([place] [address] )     Coronary artery disease     Heart disease     Left wrist injury     Low testosterone     Stroke ([place] [address] )     Vitamin D deficiency      Social History     Social History    Marital status: Single     Spouse name: N/A    Number of children: N/A    Years of education: N/A     Social History Main Topics    Smoking status: Heavy Tobacco Smoker     Packs/day: 1 00     Years: 59 00     Types: Cigarettes    Smokeless tobacco: Never Used    Alcohol use No    Drug use: No    Sexual activity: Not Currently     Other Topics Concern    None     Social History Narrative    None       Subjective  PT REFUSING BIPAP FOR hs; ONLY wants n/c= pt RN notified  V 60 @ bedside removed and cleaned & redressed  NOTE: cont  prn bipap for resp distress, hypoxia order remains  CALLED DR Bipin Ash and alerted V60 @ bedside pulled  Objective  NO RESP DISTRESS NOTED; STATS as documented       Vitals:  Blood pressure 104/66, pulse (!) 117, temperature 98 4 °F (36 9 °C), temperature source Temporal, resp  rate (!) 30, height 5' 6" (1 676 m), weight 44 2 kg (97 lb 7 1 oz), SpO2 99 %        Results from last 7 days  Lab Units 05/02/18  1021   PH ART  7 387   PCO2 ART mm Hg 41 9   PO2 ART mm Hg 80 5   HCO3 ART mmol/L 24 6   BASE EXC ART mmol/L -0 4   O2 CONTENT ART mL/dL 17 0   O2 HGB, ARTERIAL % 94 8   ABG SOURCE  Radial, Right   WICHO TEST  Yes       Imaging and other studies:CHEST      INDICATION:   chf      COMPARISON:  Chest radiographs May 2, 2018     EXAM PERFORMED/VIEWS:  XR CHEST PORTABLE        FINDINGS:     The heart is enlarged  Atherosclerotic changes in the aorta      Lung volumes diminished  Yoli and pulmonary vessels enlarged  Bilateral pleural effusions, left side larger than right, new from the prior study  Bilateral perihilar and lower lobe infiltrates  Bilateral apical pleural thickening and scarring  Right   upper extremity PICC line with tip in superior vena cava      Osseous structures appear within normal limits for patient age      IMPRESSION:  1  Mild vascular congestion  2   Bilateral perihilar and lower lobe infiltrates, likely cardiogenic in nature  Follow-up recommended to rule out the presence of superimposed pneumonia  Plan    1  Nebs per protocol  2  NC = 2 lpm  3   RT to adjust per protocol      Resp Comments:  (+?s/sl co/npc)

## 2018-05-04 NOTE — ASSESSMENT & PLAN NOTE
Most likely multifactorial secondary to acute respiratory failure and septic shock, resolved  Patient sitting up in chair with improved communication  Continue treatment of underlying conditions

## 2018-05-04 NOTE — ASSESSMENT & PLAN NOTE
Malnutrition Findings:   Malnutrition type: Chronic illness  Degree of Malnutrition: Other severe protein calorie malnutrition   Multiple chronic conditions including COPD, CHF, tobacco abuse    BMI Findings:  BMI Classifications: Underweight < 18 5     Body mass index is 15 73 kg/m²       Nutrition consult  Noted that patient is with good oral intake here, will therefore explore social issues at home

## 2018-05-05 LAB
ANION GAP SERPL CALCULATED.3IONS-SCNC: -2 MMOL/L (ref 4–13)
APTT PPP: 33 SECONDS (ref 23–35)
BASOPHILS # BLD MANUAL: 0 THOUSAND/UL (ref 0–0.1)
BASOPHILS NFR MAR MANUAL: 0 % (ref 0–1)
BUN SERPL-MCNC: 21 MG/DL (ref 5–25)
CALCIUM SERPL-MCNC: 8.7 MG/DL (ref 8.3–10.1)
CHLORIDE SERPL-SCNC: 105 MMOL/L (ref 100–108)
CO2 SERPL-SCNC: 38 MMOL/L (ref 21–32)
CREAT SERPL-MCNC: 0.52 MG/DL (ref 0.6–1.3)
EOSINOPHIL # BLD MANUAL: 0 THOUSAND/UL (ref 0–0.4)
EOSINOPHIL NFR BLD MANUAL: 0 % (ref 0–6)
ERYTHROCYTE [DISTWIDTH] IN BLOOD BY AUTOMATED COUNT: 13.7 % (ref 11.6–15.1)
GFR SERPL CREATININE-BSD FRML MDRD: 105 ML/MIN/1.73SQ M
GLUCOSE SERPL-MCNC: 129 MG/DL (ref 65–140)
HCT VFR BLD AUTO: 35.6 % (ref 36.5–49.3)
HGB BLD-MCNC: 11.4 G/DL (ref 12–17)
LYMPHOCYTES # BLD AUTO: 0.53 THOUSAND/UL (ref 0.6–4.47)
LYMPHOCYTES # BLD AUTO: 5 % (ref 14–44)
MCH RBC QN AUTO: 30.4 PG (ref 26.8–34.3)
MCHC RBC AUTO-ENTMCNC: 32 G/DL (ref 31.4–37.4)
MCV RBC AUTO: 95 FL (ref 82–98)
MONOCYTES # BLD AUTO: 0.21 THOUSAND/UL (ref 0–1.22)
MONOCYTES NFR BLD: 2 % (ref 4–12)
NEUTROPHILS # BLD MANUAL: 9.83 THOUSAND/UL (ref 1.85–7.62)
NEUTS SEG NFR BLD AUTO: 92 % (ref 43–75)
PLATELET # BLD AUTO: 197 THOUSANDS/UL (ref 149–390)
PLATELET BLD QL SMEAR: ADEQUATE
PMV BLD AUTO: 10.7 FL (ref 8.9–12.7)
POTASSIUM SERPL-SCNC: 4.2 MMOL/L (ref 3.5–5.3)
PROCALCITONIN SERPL-MCNC: 1.74 NG/ML
RBC # BLD AUTO: 3.75 MILLION/UL (ref 3.88–5.62)
SODIUM SERPL-SCNC: 141 MMOL/L (ref 136–145)
TOTAL CELLS COUNTED SPEC: 100
VANCOMYCIN TROUGH SERPL-MCNC: 16.4 UG/ML (ref 10–20)
VARIANT LYMPHS # BLD AUTO: 1 %
WBC # BLD AUTO: 10.69 THOUSAND/UL (ref 4.31–10.16)

## 2018-05-05 PROCEDURE — 85007 BL SMEAR W/DIFF WBC COUNT: CPT | Performed by: FAMILY MEDICINE

## 2018-05-05 PROCEDURE — 85730 THROMBOPLASTIN TIME PARTIAL: CPT | Performed by: FAMILY MEDICINE

## 2018-05-05 PROCEDURE — 99233 SBSQ HOSP IP/OBS HIGH 50: CPT | Performed by: FAMILY MEDICINE

## 2018-05-05 PROCEDURE — 94640 AIRWAY INHALATION TREATMENT: CPT

## 2018-05-05 PROCEDURE — 84145 PROCALCITONIN (PCT): CPT | Performed by: FAMILY MEDICINE

## 2018-05-05 PROCEDURE — 80202 ASSAY OF VANCOMYCIN: CPT | Performed by: FAMILY MEDICINE

## 2018-05-05 PROCEDURE — 85027 COMPLETE CBC AUTOMATED: CPT | Performed by: FAMILY MEDICINE

## 2018-05-05 PROCEDURE — 80048 BASIC METABOLIC PNL TOTAL CA: CPT | Performed by: FAMILY MEDICINE

## 2018-05-05 PROCEDURE — 94664 DEMO&/EVAL PT USE INHALER: CPT

## 2018-05-05 PROCEDURE — 94760 N-INVAS EAR/PLS OXIMETRY 1: CPT

## 2018-05-05 RX ORDER — FUROSEMIDE 10 MG/ML
20 INJECTION INTRAMUSCULAR; INTRAVENOUS ONCE
Status: COMPLETED | OUTPATIENT
Start: 2018-05-05 | End: 2018-05-05

## 2018-05-05 RX ORDER — METHYLPREDNISOLONE SODIUM SUCCINATE 40 MG/ML
20 INJECTION, POWDER, LYOPHILIZED, FOR SOLUTION INTRAMUSCULAR; INTRAVENOUS EVERY 12 HOURS SCHEDULED
Status: DISCONTINUED | OUTPATIENT
Start: 2018-05-05 | End: 2018-05-07

## 2018-05-05 RX ADMIN — VANCOMYCIN HYDROCHLORIDE 1000 MG: 1 INJECTION, POWDER, LYOPHILIZED, FOR SOLUTION INTRAVENOUS at 01:33

## 2018-05-05 RX ADMIN — LEVALBUTEROL 1.25 MG: 1.25 SOLUTION, CONCENTRATE RESPIRATORY (INHALATION) at 08:03

## 2018-05-05 RX ADMIN — VANCOMYCIN HYDROCHLORIDE 1000 MG: 1 INJECTION, POWDER, LYOPHILIZED, FOR SOLUTION INTRAVENOUS at 14:30

## 2018-05-05 RX ADMIN — ASPIRIN 325 MG: 325 TABLET, DELAYED RELEASE ORAL at 09:46

## 2018-05-05 RX ADMIN — CEFEPIME HYDROCHLORIDE 2000 MG: 2 INJECTION, SOLUTION INTRAVENOUS at 12:56

## 2018-05-05 RX ADMIN — NICOTINE 21 MG: 21 PATCH, EXTENDED RELEASE TRANSDERMAL at 09:47

## 2018-05-05 RX ADMIN — IPRATROPIUM BROMIDE 0.5 MG: 0.5 SOLUTION RESPIRATORY (INHALATION) at 08:03

## 2018-05-05 RX ADMIN — METHYLPREDNISOLONE SODIUM SUCCINATE 20 MG: 40 INJECTION, POWDER, FOR SOLUTION INTRAMUSCULAR; INTRAVENOUS at 20:46

## 2018-05-05 RX ADMIN — CEFEPIME HYDROCHLORIDE 2000 MG: 2 INJECTION, SOLUTION INTRAVENOUS at 22:55

## 2018-05-05 RX ADMIN — IPRATROPIUM BROMIDE 0.5 MG: 0.5 SOLUTION RESPIRATORY (INHALATION) at 20:35

## 2018-05-05 RX ADMIN — IPRATROPIUM BROMIDE 0.5 MG: 0.5 SOLUTION RESPIRATORY (INHALATION) at 13:50

## 2018-05-05 RX ADMIN — FLUTICASONE PROPIONATE AND SALMETEROL 1 PUFF: 50; 250 POWDER RESPIRATORY (INHALATION) at 20:46

## 2018-05-05 RX ADMIN — FUROSEMIDE 20 MG: 10 INJECTION, SOLUTION INTRAMUSCULAR; INTRAVENOUS at 14:47

## 2018-05-05 RX ADMIN — METHYLPREDNISOLONE SODIUM SUCCINATE 30 MG: 40 INJECTION, POWDER, FOR SOLUTION INTRAMUSCULAR; INTRAVENOUS at 09:46

## 2018-05-05 RX ADMIN — FLUTICASONE PROPIONATE AND SALMETEROL 1 PUFF: 50; 250 POWDER RESPIRATORY (INHALATION) at 09:47

## 2018-05-05 RX ADMIN — ENOXAPARIN SODIUM 40 MG: 40 INJECTION SUBCUTANEOUS at 09:46

## 2018-05-05 RX ADMIN — LEVALBUTEROL 1.25 MG: 1.25 SOLUTION, CONCENTRATE RESPIRATORY (INHALATION) at 13:51

## 2018-05-05 RX ADMIN — LEVALBUTEROL 1.25 MG: 1.25 SOLUTION, CONCENTRATE RESPIRATORY (INHALATION) at 20:35

## 2018-05-05 NOTE — ASSESSMENT & PLAN NOTE
Improved  Taper Solu-Medrol  Respiratory protocol  Continue bipap as tolerated  Pulmonology consulted, input appreciated

## 2018-05-05 NOTE — ASSESSMENT & PLAN NOTE
Fever 102, leukocytosis, tachycardia, tachypnea, lactic acidosis  Resolved  S/p PICC line placement   BP maintaining off pressors  Unclear source, suspected secondary to tracheobronchitis vs scrotal wound   Continue empiric treatment with cefepime and vancomycin  Procalcitonin trending down, continue to follow  ID consulted, input appreciated  Downgrade to med surge with telemetry

## 2018-05-05 NOTE — ASSESSMENT & PLAN NOTE
Most likely multifactorial secondary to acute respiratory failure and septic shock, resolved  Continue treatment of underlying conditions  Transfer to [facility]

## 2018-05-05 NOTE — ASSESSMENT & PLAN NOTE
No evidence of fluid overload on exam  Increase in weight noted but respiratory status improved  Will give Lasix 20 mg IV x 1 and monitor BP closely  Cardiology consulted  Monitor daily weights, intake and output

## 2018-05-05 NOTE — PLAN OF CARE
Problem: PAIN - ADULT  Goal: Verbalizes/displays adequate comfort level or baseline comfort level  Interventions:  - Encourage patient to monitor pain and request assistance  - Assess pain using appropriate pain scale  - Administer analgesics based on type and severity of pain and evaluate response  - Implement non-pharmacological measures as appropriate and evaluate response  - Consider cultural and social influences on pain and pain management  - Notify physician/advanced practitioner if interventions unsuccessful or patient reports new pain   Outcome: Progressing      Problem: INFECTION - ADULT  Goal: Absence or prevention of progression during hospitalization  INTERVENTIONS:  - Assess and monitor for signs and symptoms of infection  - Monitor lab/diagnostic results  - Monitor all insertion sites, i e  indwelling lines, tubes, and drains  - Monitor endotracheal (as able) and nasal secretions for changes in amount and color  - [place] appropriate cooling/warming therapies per order  - Administer medications as ordered  - Instruct and encourage patient and family to use good hand hygiene technique  - Identify and instruct in appropriate isolation precautions for identified infection/condition   Outcome: Progressing    Goal: Absence of fever/infection during neutropenic period  INTERVENTIONS:  - Monitor WBC  - Implement neutropenic guidelines   Outcome: Progressing      Problem: SAFETY ADULT  Goal: Patient will remain free of falls  INTERVENTIONS:  - Assess patient frequently for physical needs  -  Identify cognitive and physical deficits and behaviors that affect risk of falls    -  [place] fall precautions as indicated by assessment   - Educate patient/family on patient safety including physical limitations  - Instruct patient to call for assistance with activity based on assessment  - Modify environment to reduce risk of injury  - Consider OT/PT consult to assist with strengthening/mobility    Outcome: Progressing    Goal: Maintain or return to baseline ADL function  INTERVENTIONS:  -  Assess patient's ability to carry out ADLs; assess patient's baseline for ADL function and identify physical deficits which impact ability to perform ADLs (bathing, care of mouth/teeth, toileting, grooming, dressing, etc )  - Assess/evaluate cause of self-care deficits   - Assess range of motion  - Assess patient's mobility; develop plan if impaired  - Assess patient's need for assistive devices and provide as appropriate  - Encourage maximum independence but intervene and supervise when necessary  ¯ Involve family in performance of ADLs  ¯ Assess for home care needs following discharge   ¯ Request OT consult to assist with ADL evaluation and planning for discharge  ¯ Provide patient education as appropriate   Outcome: Progressing    Goal: Maintain or return mobility status to optimal level  INTERVENTIONS:  - Assess patient's baseline mobility status (ambulation, transfers, stairs, etc )    - Identify cognitive and physical deficits and behaviors that affect mobility  - Identify mobility aids required to assist with transfers and/or ambulation (gait belt, sit-to-stand, lift, walker, cane, etc )  - [place] fall precautions as indicated by assessment  - Record patient progress and toleration of activity level on Mobility SBAR; progress patient to next Phase/Stage  - Instruct patient to call for assistance with activity based on assessment  - Request Rehabilitation consult to assist with strengthening/weightbearing, etc    Outcome: Progressing      Problem: DISCHARGE PLANNING  Goal: Discharge to home or other facility with appropriate resources  INTERVENTIONS:  - Identify barriers to discharge w/patient and caregiver  - Arrange for needed discharge resources and transportation as appropriate  - Identify discharge learning needs (meds, wound care, etc )  - Arrange for interpretive services to assist at discharge as needed  - Refer to Case Management Department for coordinating discharge planning if the patient needs post-hospital services based on physician/advanced practitioner order or complex needs related to functional status, cognitive ability, or social support system   Outcome: Progressing      Problem: Knowledge Deficit  Goal: Patient/family/caregiver demonstrates understanding of disease process, treatment plan, medications, and discharge instructions  Complete learning assessment and assess knowledge base  Interventions:  - Provide teaching at level of understanding  - Provide teaching via preferred learning methods   Outcome: Progressing      Problem: Nutrition/Hydration-ADULT  Goal: Nutrient/Hydration intake appropriate for improving, restoring or maintaining nutritional needs  Monitor and assess patient's nutrition/hydration status for malnutrition (ex- brittle hair, bruises, dry skin, pale skin and conjunctiva, muscle wasting, smooth red tongue, and disorientation)  Collaborate with interdisciplinary team and initiate plan and interventions as ordered  Monitor patient's weight and dietary intake as ordered or per policy  Utilize nutrition screening tool and intervene per policy  Determine patient's food preferences and provide high-protein, high-caloric foods as appropriate       INTERVENTIONS:  - Monitor oral intake, urinary output, labs, and treatment plans  - Assess nutrition and hydration status and recommend course of action  - Evaluate amount of meals eaten  - Assist patient with eating if necessary   - Allow adequate time for meals  - Recommend/ encourage appropriate diets, oral nutritional supplements, and vitamin/mineral supplements  - Order, calculate, and assess calorie counts as needed  - Recommend, monitor, and adjust tube feedings and TPN/PPN based on assessed needs  - Assess need for intravenous fluids  - Provide specific nutrition/hydration education as appropriate  - Include patient/family/caregiver in decisions related to nutrition    Outcome: Progressing      Problem: CARDIOVASCULAR - ADULT  Goal: Maintains optimal cardiac output and hemodynamic stability  INTERVENTIONS:  - Monitor I/O, vital signs and rhythm  - Monitor for S/S and trends of decreased cardiac output i e  bleeding, hypotension  - Administer and titrate ordered vasoactive medications to optimize hemodynamic stability  - Assess quality of pulses, skin color and temperature  - Assess for signs of decreased coronary artery perfusion - ex   Angina  - Instruct patient to report change in severity of symptoms   Outcome: Progressing    Goal: Absence of cardiac dysrhythmias or at baseline rhythm  INTERVENTIONS:  - Continuous cardiac monitoring, monitor vital signs, obtain 12 lead EKG if indicated  - Administer antiarrhythmic and heart rate control medications as ordered  - Monitor electrolytes and administer replacement therapy as ordered   Outcome: Progressing      Problem: RESPIRATORY - ADULT  Goal: Achieves optimal ventilation and oxygenation  INTERVENTIONS:  - Assess for changes in respiratory status  - Assess for changes in mentation and behavior  - Position to facilitate oxygenation and minimize respiratory effort  - Oxygen administration by appropriate delivery method based on oxygen saturation (per order) or ABGs  - Initiate smoking cessation education as indicated  - Encourage broncho-pulmonary hygiene including cough, deep breathe, Incentive Spirometry  - Assess the need for suctioning and aspirate as needed  - Assess and instruct to report SOB or any respiratory difficulty  - Respiratory Therapy support as indicated   Outcome: Progressing      Problem: METABOLIC, FLUID AND ELECTROLYTES - ADULT  Goal: Electrolytes maintained within normal limits  INTERVENTIONS:  - Monitor labs and assess patient for signs and symptoms of electrolyte imbalances  - Administer electrolyte replacement as ordered  - Monitor response to electrolyte replacements, including repeat lab results as appropriate  - Instruct patient on fluid and nutrition as appropriate   Outcome: Progressing    Goal: Fluid balance maintained  INTERVENTIONS:  - Monitor labs and assess for signs and symptoms of volume excess or deficit  - Monitor I/O and WT  - Instruct patient on fluid and nutrition as appropriate   Outcome: Progressing    Goal: Glucose maintained within target range  INTERVENTIONS:  - Monitor Blood Glucose as ordered  - Assess for signs and symptoms of hyperglycemia and hypoglycemia  - Administer ordered medications to maintain glucose within target range  - Assess nutritional intake and initiate nutrition service referral as needed   Outcome: Progressing      Problem: Potential for Falls  Goal: Patient will remain free of falls  INTERVENTIONS:  - Assess patient frequently for physical needs  -  Identify cognitive and physical deficits and behaviors that affect risk of falls    -  [place] fall precautions as indicated by assessment   - Educate patient/family on patient safety including physical limitations  - Instruct patient to call for assistance with activity based on assessment  - Modify environment to reduce risk of injury  - Consider OT/PT consult to assist with strengthening/mobility    Outcome: Progressing      Problem: Prexisting or High Potential for Compromised Skin Integrity  Goal: Skin integrity is maintained or improved  INTERVENTIONS:  - Identify patients at risk for skin breakdown  - Assess and monitor skin integrity  - Assess and monitor nutrition and hydration status  - Monitor labs (i e  albumin)  - Assess for incontinence   - Turn and reposition patient  - Assist with mobility/ambulation  - Relieve pressure over bony prominences  - Avoid friction and shearing  - Provide appropriate hygiene as needed including keeping skin clean and dry  - Evaluate need for skin moisturizer/barrier cream  - Collaborate with interdisciplinary team (i e  Nutrition, Rehabilitation, etc )   - Patient/family teaching   Outcome: Progressing      Problem: DISCHARGE PLANNING - CARE MANAGEMENT  Goal: Discharge to post-acute care or home with appropriate resources  INTERVENTIONS:  - Conduct assessment to determine patient/family and health care team treatment goals, and need for post-acute services based on payer coverage, community resources, and patient preferences, and barriers to discharge  - Address psychosocial, clinical, and financial barriers to discharge as identified in assessment in conjunction with the patient/family and health care team  - Arrange appropriate level of post-acute services according to patient's   needs and preference and payer coverage in collaboration with the physician and health care team  - Communicate with and update the patient/family, physician, and health care team regarding progress on the discharge plan  - Arrange appropriate transportation to post-acute venues  Pt has aides from [address] living   Pt might benefit from home care on discharge   Outcome: Progressing

## 2018-05-05 NOTE — ASSESSMENT & PLAN NOTE
Most likely type 2 secondary to hypotension  Troponin trended down   Heparin discontinued  Cardiology input appreciated

## 2018-05-05 NOTE — ASSESSMENT & PLAN NOTE
Scrotal wound with tunneling noted on left lateral aspect of left testicle, improved  Urology input appreciated, Urology evaluated and signed off  Wound cx noted for MRSA and Serratia marcescens  Continue with cefepime and vancomycin  Infectious Disease consulted

## 2018-05-05 NOTE — ASSESSMENT & PLAN NOTE
Malnutrition Findings:   Malnutrition type: Chronic illness  Degree of Malnutrition: Other severe protein calorie malnutrition   Multiple chronic conditions including COPD, CHF, tobacco abuse    BMI Findings:  BMI Classifications: Underweight < 18 5     Body mass index is 16 05 kg/m²       Nutrition consult  Noted that patient is with good oral intake here, will therefore explore social issues at home

## 2018-05-05 NOTE — ASSESSMENT & PLAN NOTE
Likely multifactorial secondary to sepsis as well as acute on chronic severe COPD  Significantly improved, maintaining   Continue bipap as tolerated   Maintain oxygen saturation above 88%  Respiratory protocol, BiPAP at bedside  Consulted Pulmonology/Critical Care, input appreciated

## 2018-05-05 NOTE — PROGRESS NOTES
Progress Note - Sis Salazar 1945, 74 y o  male MRN: [id number]    Unit/Bed#:  Encounter: [id number]    Primary Care Provider: Justen Parker PA-C   Date and time admitted to hospital: 5/1/2018  1:16 AM        Acute on chronic respiratory failure with hypoxia and hypercapnia (HCC)   Assessment & Plan    Likely multifactorial secondary to sepsis as well as acute on chronic severe COPD  Significantly improved, maintaining   Continue bipap as tolerated   Maintain oxygen saturation above 88%  Respiratory protocol, BiPAP at bedside  Consulted Pulmonology/Critical Care, input appreciated        * Septic shock (HCC)   Assessment & Plan    Fever 102, leukocytosis, tachycardia, tachypnea, lactic acidosis  Resolved  S/p PICC line placement   BP maintaining off pressors  Unclear source, suspected secondary to tracheobronchitis vs scrotal wound   Continue empiric treatment with cefepime and vancomycin  Procalcitonin trending down, continue to follow  ID consulted, input appreciated  [place] [facility] [place]           [place]D with acute exacerbation ([facility] [address] )   Assessment & Plan    Improved  Taper Solu-Medrol  Respiratory protocol  Continue bipap as tolerated  Pulmonology consulted, input appreciated        NSTEMI (non-ST elevated myocardial infarction) ([facility] [address] )   Assessment & Plan    Most likely type 2 secondary to hypotension  Troponin trended down   Heparin discontinued  Cardiology input appreciated          Acute encephalopathy   Assessment & Plan    Most likely multifactorial secondary to acute respiratory failure and septic shock, resolved  Continue treatment of underlying conditions  Transfer to [facility]        Chronic diastolic congestive heart failure (HCC)   Assessment & Plan    No evidence of fluid overload on exam  Increase in weight noted but respiratory status improved  Will give Lasix 20 mg IV x 1 and monitor BP closely  Cardiology consulted  Monitor daily weights, intake and output Scrotal abscess   Assessment & Plan    Scrotal wound with tunneling noted on left lateral aspect of left testicle, improved  Urology input appreciated, Urology evaluated and signed off  Wound cx noted for MRSA and Serratia marcescens  Continue with cefepime and vancomycin  Infectious Disease consulted        Severe protein-calorie malnutrition ([address] )   Assessment & Plan    Malnutrition Findings:   Malnutrition type: Chronic illness  Degree of Malnutrition: Other severe protein calorie malnutrition   Multiple chronic conditions including COPD, CHF, tobacco abuse    BMI Findings:  BMI Classifications: Underweight < 18 5     Body mass index is 16 05 kg/m²  Nutrition consult  Noted that patient is with good oral intake here, will therefore explore social issues at home        Tobacco abuse   Assessment & Plan     cessation  Offer nicotine patch          VTE Pharmacologic Prophylaxis:   Pharmacologic: Enoxaparin (Lovenox)  Mechanical VTE Prophylaxis in Place: Yes    Patient Centered Rounds: I have performed bedside rounds with nursing staff today  Discussions with Specialists or Other Care Team Provider: ID, Pulmonology    Education and Discussions with Family / Patient: Patient    Time Spent for Care: 45 minutes  More than 50% of total time spent on counseling and coordination of care as described above  Current Length of Stay: 4 day(s)    Current Patient Status: Inpatient   Certification Statement: The patient will continue to require additional inpatient hospital stay due to need for close monitoring, IV antibiotics    Discharge Plan: TBD    Code Status: Level 3 - DNAR and DNI      Subjective:   Patient seen and examined  He appears comfortable  Eating his breakfast with appetite  States that he feels better, denies chest pain, SOB or palpitations  Denies abdominal pain, nausea, vomiting, diarrhea or constipation  Denies dysuria      Objective:     Vitals:   Temp (24hrs), Av 2 °F (36 8 °C), Min:97 6 °F (36 4 °C), Max:98 6 °F (37 °C)    HR:  [] 108  Resp:  [12-30] 19  BP: ()/(52-74) 104/59  SpO2:  [95 %-99 %] 95 %  Body mass index is 16 05 kg/m²  Input and Output Summary (last 24 hours): Intake/Output Summary (Last 24 hours) at 05/05/18 1317  Last data filed at 05/05/18 0900   Gross per 24 hour   Intake           1607 5 ml   Output             1100 ml   Net            507 5 ml       Physical Exam:     Physical Exam   Constitutional: He is oriented to person, place, and time  He appears cachectic  HENT:   Head: Normocephalic and atraumatic  Eyes: Conjunctivae are normal    Neck: No JVD present  Cardiovascular: Normal rate and regular rhythm  Exam reveals no gallop and no friction rub  No murmur heard  Pulmonary/Chest: Effort normal  No respiratory distress  He has no wheezes  He has rales  Abdominal: Soft  He exhibits no distension  There is no tenderness  There is no guarding  Musculoskeletal: He exhibits no edema  Neurological: He is alert and oriented to person, place, and time  Skin: Skin is warm and dry  Psychiatric: He has a normal mood and affect  Additional Data:     Labs:      Results from last 7 days  Lab Units 05/05/18  0457  05/01/18  0227   WBC Thousand/uL 10 69*  < > 14 41*   HEMOGLOBIN g/dL 11 4*  < > 15 9   HEMATOCRIT % 35 6*  < > 48 6   PLATELETS Thousands/uL 197  < > 243   NEUTROS PCT %  --   --  86*   LYMPHS PCT %  --   --  4*   LYMPHO PCT % 5*  < >  --    MONOS PCT %  --   --  10   MONO PCT MAN % 2*  < >  --    EOS PCT %  --   --  0   EOSINO PCT MANUAL % 0  < >  --    < > = values in this interval not displayed      Results from last 7 days  Lab Units 05/05/18  0457  05/02/18  0442   SODIUM mmol/L 141  < > 139   POTASSIUM mmol/L 4 2  < > 4 0   CHLORIDE mmol/L 105  < > 104   CO2 mmol/L 38*  < > 30   BUN mg/dL 21  < > 23   CREATININE mg/dL 0 52*  < > 0 78   CALCIUM mg/dL 8 7  < > 8 7   TOTAL PROTEIN g/dL  --   --  5 6*   BILIRUBIN TOTAL mg/dL  --   --  0 50   ALK PHOS U/L  --   --  56   ALT U/L  --   --  15   AST U/L  --   --  27   GLUCOSE RANDOM mg/dL 129  < > 127   < > = values in this interval not displayed  Results from last 7 days  Lab Units 05/02/18  0442   INR  1 17*               * I Have Reviewed All Lab Data Listed Above  * Additional Pertinent Lab Tests Reviewed: [address] Admission Reviewed    Imaging:    Imaging Reports Reviewed Today Include: xray chest  Imaging Personally Reviewed by Myself Includes:  Xray chest    Recent Cultures (last 7 days):       Results from last 7 days  Lab Units 05/04/18  0536 05/03/18  1638 05/01/18  1151 05/01/18  1103 05/01/18  0234 05/01/18  0227 05/01/18  0218   BLOOD CULTURE   --   --   --   --  No Growth After 4 Days  No Growth After 4 Days    --    SPUTUM CULTURE   --  1+ Growth of Candida sp  presumptively albicans*  --   --   --   --   --    GRAM STAIN RESULT   --  1+ Polys  2+ Budding yeast  --   --   --   --  1+ Polys  1+ Gram positive cocci in clusters   URINE CULTURE   --   --  No Growth <1000 cfu/mL  --   --   --   --    WOUND CULTURE   --   --   --   --   --   --  3+ Growth of Methicillin Resistant Staphylococcus aureus*  3+ Growth of Klebsiella pneumoniae*  3+ Growth of Serratia marcescens*   INFLUENZA A PCR   --   --   --  None Detected  --   --   --    INFLUENZA B PCR   --   --   --  None Detected  --   --   --    RSV PCR   --   --   --  None Detected  --   --   --    C DIFF TOXIN B  NEGATIVE for C difficle toxin by PCR    --   --   --   --   --   --        Last 24 Hours Medication List:     Current Facility-Administered Medications:  acetaminophen 650 mg Rectal Q6H PRN Brendon Mcdermott MD    aspirin 325 mg Oral Daily Maged Gallagher MD    cefepime 2,000 mg Intravenous Q12H Brendon Mcdermott MD Last Rate: 2,000 mg (05/05/18 1256)   enoxaparin 40 mg Subcutaneous Q24H [address] Christiana Schroeder MD    fluticasone-salmeterol 1 puff Inhalation Q12H Randy Merle, MD ipratropium 0 5 mg Nebulization TID Val Voss MD    levalbuterol 1 25 mg Nebulization TID Val Voss MD    levalbuterol 1 25 mg Nebulization Q6H PRN Christiana Schroeder MD    LORazepam 0 5 mg Intravenous Q4H PRN Val Voss MD    methylPREDNISolone sodium succinate 20 mg Intravenous Q12H [address] Christiana Schroeder MD    morphine injection 2 mg Intravenous Q3H PRN Saige Haynes MD    nicotine 21 mg Transdermal Daily Val Voss MD    nitroglycerin 0 4 mg Sublingual Q5 Min PRN Saige Haynes MD    sodium chloride 3 mL Nebulization Q6H PRN Val Voss MD    vancomycin 1,000 mg Intravenous Q12H Val Voss MD Last Rate: Stopped (05/05/18 0233)        Today, Patient Was Seen By: Rigo Melo MD    ** Please Note: Dictation voice to text software may have been used in the creation of this document   **

## 2018-05-06 LAB
ANION GAP SERPL CALCULATED.3IONS-SCNC: -1 MMOL/L (ref 4–13)
BACTERIA BLD CULT: NORMAL
BACTERIA BLD CULT: NORMAL
BACTERIA SPT RESP CULT: ABNORMAL
BASOPHILS # BLD MANUAL: 0 THOUSAND/UL (ref 0–0.1)
BASOPHILS NFR MAR MANUAL: 0 % (ref 0–1)
BUN SERPL-MCNC: 21 MG/DL (ref 5–25)
CALCIUM SERPL-MCNC: 8.8 MG/DL (ref 8.3–10.1)
CHLORIDE SERPL-SCNC: 102 MMOL/L (ref 100–108)
CO2 SERPL-SCNC: 39 MMOL/L (ref 21–32)
CREAT SERPL-MCNC: 0.57 MG/DL (ref 0.6–1.3)
EOSINOPHIL # BLD MANUAL: 0 THOUSAND/UL (ref 0–0.4)
EOSINOPHIL NFR BLD MANUAL: 0 % (ref 0–6)
ERYTHROCYTE [DISTWIDTH] IN BLOOD BY AUTOMATED COUNT: 13.5 % (ref 11.6–15.1)
GFR SERPL CREATININE-BSD FRML MDRD: 101 ML/MIN/1.73SQ M
GLUCOSE SERPL-MCNC: 160 MG/DL (ref 65–140)
GRAM STN SPEC: ABNORMAL
GRAM STN SPEC: ABNORMAL
HCT VFR BLD AUTO: 40 % (ref 36.5–49.3)
HGB BLD-MCNC: 12.9 G/DL (ref 12–17)
LG PLATELETS BLD QL SMEAR: PRESENT
LYMPHOCYTES # BLD AUTO: 1.77 THOUSAND/UL (ref 0.6–4.47)
LYMPHOCYTES # BLD AUTO: 13 % (ref 14–44)
MCH RBC QN AUTO: 30.1 PG (ref 26.8–34.3)
MCHC RBC AUTO-ENTMCNC: 32.3 G/DL (ref 31.4–37.4)
MCV RBC AUTO: 94 FL (ref 82–98)
MONOCYTES # BLD AUTO: 0.68 THOUSAND/UL (ref 0–1.22)
MONOCYTES NFR BLD: 5 % (ref 4–12)
NEUTROPHILS # BLD MANUAL: 11.02 THOUSAND/UL (ref 1.85–7.62)
NEUTS SEG NFR BLD AUTO: 81 % (ref 43–75)
PLATELET # BLD AUTO: 221 THOUSANDS/UL (ref 149–390)
PLATELET BLD QL SMEAR: ADEQUATE
PMV BLD AUTO: 10.3 FL (ref 8.9–12.7)
POTASSIUM SERPL-SCNC: 4 MMOL/L (ref 3.5–5.3)
PROCALCITONIN SERPL-MCNC: 0.75 NG/ML
RBC # BLD AUTO: 4.28 MILLION/UL (ref 3.88–5.62)
SODIUM SERPL-SCNC: 140 MMOL/L (ref 136–145)
TOTAL CELLS COUNTED SPEC: 100
VARIANT LYMPHS # BLD AUTO: 1 %
WBC # BLD AUTO: 13.6 THOUSAND/UL (ref 4.31–10.16)

## 2018-05-06 PROCEDURE — 85027 COMPLETE CBC AUTOMATED: CPT | Performed by: FAMILY MEDICINE

## 2018-05-06 PROCEDURE — 85007 BL SMEAR W/DIFF WBC COUNT: CPT | Performed by: FAMILY MEDICINE

## 2018-05-06 PROCEDURE — 99232 SBSQ HOSP IP/OBS MODERATE 35: CPT | Performed by: FAMILY MEDICINE

## 2018-05-06 PROCEDURE — 94760 N-INVAS EAR/PLS OXIMETRY 1: CPT

## 2018-05-06 PROCEDURE — 94640 AIRWAY INHALATION TREATMENT: CPT

## 2018-05-06 PROCEDURE — 84145 PROCALCITONIN (PCT): CPT | Performed by: FAMILY MEDICINE

## 2018-05-06 PROCEDURE — 80048 BASIC METABOLIC PNL TOTAL CA: CPT | Performed by: FAMILY MEDICINE

## 2018-05-06 RX ORDER — FUROSEMIDE 10 MG/ML
20 INJECTION INTRAMUSCULAR; INTRAVENOUS
Status: DISCONTINUED | OUTPATIENT
Start: 2018-05-06 | End: 2018-05-07

## 2018-05-06 RX ADMIN — IPRATROPIUM BROMIDE 0.5 MG: 0.5 SOLUTION RESPIRATORY (INHALATION) at 20:50

## 2018-05-06 RX ADMIN — FUROSEMIDE 20 MG: 10 INJECTION, SOLUTION INTRAMUSCULAR; INTRAVENOUS at 16:14

## 2018-05-06 RX ADMIN — ENOXAPARIN SODIUM 40 MG: 40 INJECTION SUBCUTANEOUS at 08:51

## 2018-05-06 RX ADMIN — VANCOMYCIN HYDROCHLORIDE 1000 MG: 1 INJECTION, POWDER, LYOPHILIZED, FOR SOLUTION INTRAVENOUS at 16:14

## 2018-05-06 RX ADMIN — FUROSEMIDE 20 MG: 10 INJECTION, SOLUTION INTRAMUSCULAR; INTRAVENOUS at 08:52

## 2018-05-06 RX ADMIN — METHYLPREDNISOLONE SODIUM SUCCINATE 20 MG: 40 INJECTION, POWDER, FOR SOLUTION INTRAMUSCULAR; INTRAVENOUS at 08:52

## 2018-05-06 RX ADMIN — NICOTINE 21 MG: 21 PATCH, EXTENDED RELEASE TRANSDERMAL at 08:54

## 2018-05-06 RX ADMIN — LEVALBUTEROL 1.25 MG: 1.25 SOLUTION, CONCENTRATE RESPIRATORY (INHALATION) at 13:41

## 2018-05-06 RX ADMIN — LEVALBUTEROL 1.25 MG: 1.25 SOLUTION, CONCENTRATE RESPIRATORY (INHALATION) at 08:11

## 2018-05-06 RX ADMIN — METHYLPREDNISOLONE SODIUM SUCCINATE 20 MG: 40 INJECTION, POWDER, FOR SOLUTION INTRAMUSCULAR; INTRAVENOUS at 21:48

## 2018-05-06 RX ADMIN — FLUTICASONE PROPIONATE AND SALMETEROL 1 PUFF: 50; 250 POWDER RESPIRATORY (INHALATION) at 21:47

## 2018-05-06 RX ADMIN — IPRATROPIUM BROMIDE 0.5 MG: 0.5 SOLUTION RESPIRATORY (INHALATION) at 13:41

## 2018-05-06 RX ADMIN — ASPIRIN 325 MG: 325 TABLET, DELAYED RELEASE ORAL at 08:53

## 2018-05-06 RX ADMIN — FLUTICASONE PROPIONATE AND SALMETEROL 1 PUFF: 50; 250 POWDER RESPIRATORY (INHALATION) at 08:54

## 2018-05-06 RX ADMIN — VANCOMYCIN HYDROCHLORIDE 1000 MG: 1 INJECTION, POWDER, LYOPHILIZED, FOR SOLUTION INTRAVENOUS at 02:19

## 2018-05-06 RX ADMIN — CEFEPIME HYDROCHLORIDE 2000 MG: 2 INJECTION, SOLUTION INTRAVENOUS at 12:15

## 2018-05-06 RX ADMIN — LEVALBUTEROL 1.25 MG: 1.25 SOLUTION, CONCENTRATE RESPIRATORY (INHALATION) at 20:50

## 2018-05-06 RX ADMIN — IPRATROPIUM BROMIDE 0.5 MG: 0.5 SOLUTION RESPIRATORY (INHALATION) at 08:11

## 2018-05-06 NOTE — ASSESSMENT & PLAN NOTE
Fever 102, leukocytosis, tachycardia, tachypnea, lactic acidosis  Resolved  S/p PICC line placement   BP maintaining off pressors  Unclear source, suspected secondary to tracheobronchitis vs scrotal wound   Continue empiric treatment with cefepime and vancomycin  Procalcitonin trending down, continue to follow  ID consulted, input appreciated

## 2018-05-06 NOTE — ASSESSMENT & PLAN NOTE
Malnutrition Findings:   Malnutrition type: Chronic illness  Degree of Malnutrition: Other severe protein calorie malnutrition   Multiple chronic conditions including COPD, CHF, tobacco abuse    BMI Findings:  BMI Classifications: Underweight < 18 5     Body mass index is 17 29 kg/m²       Nutrition consult  Noted that patient is with good oral intake here, will therefore explore social issues at home

## 2018-05-06 NOTE — ASSESSMENT & PLAN NOTE
Likely multifactorial secondary to sepsis as well as acute on chronic severe COPD  Significantly improved, maintaining acceptable oxygen saturation on NC, continue oxygen supplementation above 88%  Respiratory protocol  Consulted Pulmonology/Critical Care, input appreciated

## 2018-05-06 NOTE — ASSESSMENT & PLAN NOTE
Most likely type 2 secondary to hypotension  Troponin trended down   2D Echo reviewed, normal EF  Heparin discontinued  Cardiology input appreciated

## 2018-05-06 NOTE — ASSESSMENT & PLAN NOTE
Most likely multifactorial secondary to acute respiratory failure and septic shock, resolved  Continue treatment of underlying conditions

## 2018-05-06 NOTE — PROGRESS NOTES
Progress Note - Kendrick Posey 1945, 74 y o  male MRN: [id number]    Unit/Bed#: 424-01 Encounter: [id number]    Primary Care Provider: Griffin Menendez PA-C   Date and time admitted to hospital: 5/1/2018  1:16 AM        Acute on chronic respiratory failure with hypoxia and hypercapnia (HCC)   Assessment & Plan    Likely multifactorial secondary to sepsis as well as acute on chronic severe COPD  Significantly improved, maintaining acceptable oxygen saturation on NC, continue oxygen supplementation above 88%  Respiratory protocol  Consulted Pulmonology/Critical Care, input appreciated        * Septic shock (HCC)   Assessment & Plan    Fever 102, leukocytosis, tachycardia, tachypnea, lactic acidosis  Resolved  S/p PICC line placement   BP maintaining off pressors  Unclear source, suspected secondary to tracheobronchitis vs scrotal wound   Continue empiric treatment with cefepime and vancomycin  Procalcitonin trending down, continue to follow  ID consulted, input appreciated            COPD with acute exacerbation (HCC)   Assessment & Plan    Improved  Taper Solu-Medrol  Respiratory protocol  Continue bipap as tolerated  Pulmonology consulted, input appreciated        NSTEMI (non-ST elevated myocardial infarction) ([facility] [address] )   Assessment & Plan    Most likely type 2 secondary to hypotension  Troponin trended down   2D Echo reviewed, normal EF  Heparin discontinued  Cardiology input appreciated          Acute encephalopathy   Assessment & Plan    Most likely multifactorial secondary to acute respiratory failure and septic shock, resolved  Continue treatment of underlying conditions          Chronic diastolic congestive heart failure (HCC)   Assessment & Plan    No evidence of fluid overload on exam  Increase in weight noted but respiratory status improved  Will continue Lasix 20 mg IV BID  Cardiology consulted  Monitor daily weights, intake and output          Scrotal abscess   Assessment & Plan    Scrotal wound with tunneling noted on left lateral aspect of left testicle, improved  Urology input appreciated, Urology evaluated and signed off  Wound cx noted for MRSA and Serratia marcescens  Continue with cefepime and vancomycin  Infectious Disease consulted        Severe protein-calorie malnutrition ([address] )   Assessment & Plan    Malnutrition Findings:   Malnutrition type: Chronic illness  Degree of Malnutrition: Other severe protein calorie malnutrition   Multiple chronic conditions including COPD, CHF, tobacco abuse    BMI Findings:  BMI Classifications: Underweight < 18 5     Body mass index is 17 29 kg/m²  Nutrition consult  Noted that patient is with good oral intake here, will therefore explore social issues at home        Tobacco abuse   Assessment & Plan     cessation  Offer nicotine patch          VTE Pharmacologic Prophylaxis:   Pharmacologic: Enoxaparin (Lovenox)  Mechanical VTE Prophylaxis in Place: Yes    Discussions with Specialists or Other Care Team Provider: Reviewed record    Education and Discussions with Family / Patient: Patient    Time Spent for Care: 30 minutes  More than 50% of total time spent on counseling and coordination of care as described above  Current Length of Stay: 5 day(s)    Current Patient Status: Inpatient   Certification Statement: The patient will continue to require additional inpatient hospital stay due to need for IV treatment, close monitoring    Discharge Plan: TBD    Code Status: Level 3 - DNAR and DNI      Subjective:   Patient seen and examined  He reports feeling better with his breathing, good appetite, slept well, no o/n events  Objective:     Vitals:   Temp (24hrs), Av °F (36 7 °C), Min:97 1 °F (36 2 °C), Max:98 6 °F (37 °C)    HR:  [101-119] 102  Resp:  [15-30] 18  BP: ()/(54-72) 115/68  SpO2:  [92 %-96 %] 93 %  Body mass index is 17 29 kg/m²  Input and Output Summary (last 24 hours):        Intake/Output Summary (Last 24 hours) at 18 Ned Ruiz 124 filed at 05/06/18 1300   Gross per 24 hour   Intake             1640 ml   Output             3200 ml   Net            -1560 ml       Physical Exam:     Physical Exam   Constitutional: He is oriented to person, place, and time  He appears cachectic  HENT:   Head: Normocephalic and atraumatic  Eyes: Conjunctivae are normal    Neck: No JVD present  Cardiovascular: Normal rate and regular rhythm  Pulmonary/Chest: Effort normal  No respiratory distress  He has wheezes  He has rales  Abdominal: Soft  He exhibits no distension  There is no tenderness  There is no guarding  Musculoskeletal: He exhibits no edema  Neurological: He is alert and oriented to person, place, and time  Skin: Skin is warm and dry  Psychiatric: He has a normal mood and affect  Additional Data:     Labs:      Results from last 7 days  Lab Units 05/06/18  0541  05/01/18  0227   WBC Thousand/uL 13 60*  < > 14 41*   HEMOGLOBIN g/dL 12 9  < > 15 9   HEMATOCRIT % 40 0  < > 48 6   PLATELETS Thousands/uL 221  < > 243   NEUTROS PCT %  --   --  86*   LYMPHS PCT %  --   --  4*   LYMPHO PCT % 13*  < >  --    MONOS PCT %  --   --  10   MONO PCT MAN % 5  < >  --    EOS PCT %  --   --  0   EOSINO PCT MANUAL % 0  < >  --    < > = values in this interval not displayed  Results from last 7 days  Lab Units 05/06/18 0541  05/02/18 0442   SODIUM mmol/L 140  < > 139   POTASSIUM mmol/L 4 0  < > 4 0   CHLORIDE mmol/L 102  < > 104   CO2 mmol/L 39*  < > 30   BUN mg/dL 21  < > 23   CREATININE mg/dL 0 57*  < > 0 78   CALCIUM mg/dL 8 8  < > 8 7   TOTAL PROTEIN g/dL  --   --  5 6*   BILIRUBIN TOTAL mg/dL  --   --  0 50   ALK PHOS U/L  --   --  56   ALT U/L  --   --  15   AST U/L  --   --  27   GLUCOSE RANDOM mg/dL 160*  < > 127   < > = values in this interval not displayed  Results from last 7 days  Lab Units 05/02/18  0442   INR  1 17*                 * I Have Reviewed All Lab Data Listed Above    * Additional Pertinent Lab Tests Reviewed: [address] Admission Reviewed    Imaging:    Imaging Reports Reviewed Today Include: chest xray  Imaging Personally Reviewed by Myself Includes:  Chest xray    Recent Cultures (last 7 days):       Results from last 7 days  Lab Units 05/04/18  0536 05/03/18  1638 05/01/18  1151 05/01/18  1103 05/01/18  0234 05/01/18  0227 05/01/18  0218   BLOOD CULTURE   --   --   --   --  No Growth After 5 Days  No Growth After 5 Days    --    SPUTUM CULTURE   --  1+ Growth of Candida sp  presumptively albicans*  --   --   --   --   --    GRAM STAIN RESULT   --  1+ Polys  2+ Budding yeast  --   --   --   --  1+ Polys  1+ Gram positive cocci in clusters   URINE CULTURE   --   --  No Growth <1000 cfu/mL  --   --   --   --    WOUND CULTURE   --   --   --   --   --   --  3+ Growth of Methicillin Resistant Staphylococcus aureus*  3+ Growth of Klebsiella pneumoniae*  3+ Growth of Serratia marcescens*   INFLUENZA A PCR   --   --   --  None Detected  --   --   --    INFLUENZA B PCR   --   --   --  None Detected  --   --   --    RSV PCR   --   --   --  None Detected  --   --   --    C DIFF TOXIN B  NEGATIVE for C difficle toxin by PCR    --   --   --   --   --   --        Last 24 Hours Medication List:     Current Facility-Administered Medications:  acetaminophen 650 mg Rectal Q6H PRN Barbara Fink MD    aspirin 325 mg Oral Daily Bertha Krishnan MD    cefepime 2,000 mg Intravenous Q12H Barbara Fink MD Last Rate: 2,000 mg (05/06/18 1215)   enoxaparin 40 mg Subcutaneous Q24H [address] Christiana Schroeder MD    fluticasone-salmeterol 1 puff Inhalation Q12H [address] Christiana Schroeder MD    furosemide 20 mg Intravenous BID (diuretic) Barbara Fink MD    ipratropium 0 5 mg Nebulization TID Barbara Fink MD    levalbuterol 1 25 mg Nebulization TID Barbara Fink MD    levalbuterol 1 25 mg Nebulization Q6H PRN Christiana Schroeder MD    LORazepam 0 5 mg Intravenous Q4H PRN Barbara Fink MD    methylPREDNISolone sodium succinate 20 mg Intravenous Q12H [address] Christiana Schroeder MD    morphine injection 2 mg Intravenous Q3H PRN Susan Goss MD    nicotine 21 mg Transdermal Daily Christiana Schroeder MD    nitroglycerin 0 4 mg Sublingual Q5 Min PRN Susan Goss MD    sodium chloride 3 mL Nebulization Q6H PRN Kina Pastor MD    vancomycin 1,000 mg Intravenous Q12H Kina Pastor MD Last Rate: 1,000 mg (05/06/18 0219)        Today, Patient Was Seen By: Rosita Felipe MD    ** Please Note: Dictation voice to text software may have been used in the creation of this document   **

## 2018-05-06 NOTE — ASSESSMENT & PLAN NOTE
No evidence of fluid overload on exam  Increase in weight noted but respiratory status improved  Will continue Lasix 20 mg IV BID  Cardiology consulted  Monitor daily weights, intake and output

## 2018-05-07 PROBLEM — N50.89 SCROTAL ULCER: Status: ACTIVE | Noted: 2018-05-01

## 2018-05-07 LAB
ANION GAP SERPL CALCULATED.3IONS-SCNC: -1 MMOL/L (ref 4–13)
BASOPHILS # BLD MANUAL: 0 THOUSAND/UL (ref 0–0.1)
BASOPHILS NFR MAR MANUAL: 0 % (ref 0–1)
BUN SERPL-MCNC: 31 MG/DL (ref 5–25)
CALCIUM SERPL-MCNC: 8.7 MG/DL (ref 8.3–10.1)
CHLORIDE SERPL-SCNC: 100 MMOL/L (ref 100–108)
CO2 SERPL-SCNC: 41 MMOL/L (ref 21–32)
CREAT SERPL-MCNC: 0.66 MG/DL (ref 0.6–1.3)
EOSINOPHIL # BLD MANUAL: 0 THOUSAND/UL (ref 0–0.4)
EOSINOPHIL NFR BLD MANUAL: 0 % (ref 0–6)
ERYTHROCYTE [DISTWIDTH] IN BLOOD BY AUTOMATED COUNT: 13.9 % (ref 11.6–15.1)
GFR SERPL CREATININE-BSD FRML MDRD: 95 ML/MIN/1.73SQ M
GLUCOSE SERPL-MCNC: 131 MG/DL (ref 65–140)
HCT VFR BLD AUTO: 40.7 % (ref 36.5–49.3)
HGB BLD-MCNC: 13.5 G/DL (ref 12–17)
LYMPHOCYTES # BLD AUTO: 0.78 THOUSAND/UL (ref 0.6–4.47)
LYMPHOCYTES # BLD AUTO: 4 % (ref 14–44)
MCH RBC QN AUTO: 30.9 PG (ref 26.8–34.3)
MCHC RBC AUTO-ENTMCNC: 33.2 G/DL (ref 31.4–37.4)
MCV RBC AUTO: 93 FL (ref 82–98)
MONOCYTES # BLD AUTO: 2.33 THOUSAND/UL (ref 0–1.22)
MONOCYTES NFR BLD: 12 % (ref 4–12)
NEUTROPHILS # BLD MANUAL: 16.32 THOUSAND/UL (ref 1.85–7.62)
NEUTS SEG NFR BLD AUTO: 84 % (ref 43–75)
PLATELET # BLD AUTO: 231 THOUSANDS/UL (ref 149–390)
PLATELET BLD QL SMEAR: ADEQUATE
PMV BLD AUTO: 10.2 FL (ref 8.9–12.7)
POTASSIUM SERPL-SCNC: 4.3 MMOL/L (ref 3.5–5.3)
PROCALCITONIN SERPL-MCNC: 0.42 NG/ML
RBC # BLD AUTO: 4.37 MILLION/UL (ref 3.88–5.62)
SODIUM SERPL-SCNC: 140 MMOL/L (ref 136–145)
TOTAL CELLS COUNTED SPEC: 100
WBC # BLD AUTO: 19.43 THOUSAND/UL (ref 4.31–10.16)

## 2018-05-07 PROCEDURE — 99232 SBSQ HOSP IP/OBS MODERATE 35: CPT | Performed by: INTERNAL MEDICINE

## 2018-05-07 PROCEDURE — 85007 BL SMEAR W/DIFF WBC COUNT: CPT | Performed by: FAMILY MEDICINE

## 2018-05-07 PROCEDURE — 94760 N-INVAS EAR/PLS OXIMETRY 1: CPT

## 2018-05-07 PROCEDURE — 85027 COMPLETE CBC AUTOMATED: CPT | Performed by: FAMILY MEDICINE

## 2018-05-07 PROCEDURE — 80048 BASIC METABOLIC PNL TOTAL CA: CPT | Performed by: FAMILY MEDICINE

## 2018-05-07 PROCEDURE — 97116 GAIT TRAINING THERAPY: CPT

## 2018-05-07 PROCEDURE — 99232 SBSQ HOSP IP/OBS MODERATE 35: CPT | Performed by: FAMILY MEDICINE

## 2018-05-07 PROCEDURE — 84145 PROCALCITONIN (PCT): CPT | Performed by: FAMILY MEDICINE

## 2018-05-07 PROCEDURE — 97530 THERAPEUTIC ACTIVITIES: CPT

## 2018-05-07 PROCEDURE — 94640 AIRWAY INHALATION TREATMENT: CPT

## 2018-05-07 RX ORDER — ACETAZOLAMIDE 500 MG/5ML
250 INJECTION, POWDER, LYOPHILIZED, FOR SOLUTION INTRAVENOUS EVERY 12 HOURS SCHEDULED
Status: COMPLETED | OUTPATIENT
Start: 2018-05-07 | End: 2018-05-09

## 2018-05-07 RX ORDER — METHYLPREDNISOLONE SODIUM SUCCINATE 40 MG/ML
20 INJECTION, POWDER, LYOPHILIZED, FOR SOLUTION INTRAMUSCULAR; INTRAVENOUS DAILY
Status: DISCONTINUED | OUTPATIENT
Start: 2018-05-07 | End: 2018-05-07

## 2018-05-07 RX ADMIN — CEFEPIME HYDROCHLORIDE 2000 MG: 2 INJECTION, SOLUTION INTRAVENOUS at 22:34

## 2018-05-07 RX ADMIN — LEVALBUTEROL 1.25 MG: 1.25 SOLUTION, CONCENTRATE RESPIRATORY (INHALATION) at 09:22

## 2018-05-07 RX ADMIN — IPRATROPIUM BROMIDE 0.5 MG: 0.5 SOLUTION RESPIRATORY (INHALATION) at 14:07

## 2018-05-07 RX ADMIN — METHYLPREDNISOLONE SODIUM SUCCINATE 20 MG: 40 INJECTION, POWDER, FOR SOLUTION INTRAMUSCULAR; INTRAVENOUS at 10:30

## 2018-05-07 RX ADMIN — ENOXAPARIN SODIUM 40 MG: 40 INJECTION SUBCUTANEOUS at 10:32

## 2018-05-07 RX ADMIN — IPRATROPIUM BROMIDE 0.5 MG: 0.5 SOLUTION RESPIRATORY (INHALATION) at 09:22

## 2018-05-07 RX ADMIN — VANCOMYCIN HYDROCHLORIDE 1000 MG: 1 INJECTION, POWDER, LYOPHILIZED, FOR SOLUTION INTRAVENOUS at 13:06

## 2018-05-07 RX ADMIN — CEFEPIME HYDROCHLORIDE 2000 MG: 2 INJECTION, SOLUTION INTRAVENOUS at 00:15

## 2018-05-07 RX ADMIN — ASPIRIN 325 MG: 325 TABLET, DELAYED RELEASE ORAL at 10:33

## 2018-05-07 RX ADMIN — FLUTICASONE PROPIONATE AND SALMETEROL 1 PUFF: 50; 250 POWDER RESPIRATORY (INHALATION) at 10:34

## 2018-05-07 RX ADMIN — FLUTICASONE PROPIONATE AND SALMETEROL 1 PUFF: 50; 250 POWDER RESPIRATORY (INHALATION) at 22:16

## 2018-05-07 RX ADMIN — VANCOMYCIN HYDROCHLORIDE 1000 MG: 1 INJECTION, POWDER, LYOPHILIZED, FOR SOLUTION INTRAVENOUS at 04:01

## 2018-05-07 RX ADMIN — NICOTINE 21 MG: 21 PATCH, EXTENDED RELEASE TRANSDERMAL at 10:33

## 2018-05-07 RX ADMIN — LEVALBUTEROL 1.25 MG: 1.25 SOLUTION, CONCENTRATE RESPIRATORY (INHALATION) at 14:07

## 2018-05-07 RX ADMIN — ACETAZOLAMIDE 250 MG: 500 INJECTION, POWDER, LYOPHILIZED, FOR SOLUTION INTRAVENOUS at 22:21

## 2018-05-07 RX ADMIN — CEFEPIME HYDROCHLORIDE 2000 MG: 2 INJECTION, SOLUTION INTRAVENOUS at 10:48

## 2018-05-07 NOTE — CASE MANAGEMENT
Continued Stay Review    Date: 18  Vitals:   Temp (24hrs), Av 2 °F (36 8 °C), Min:97 6 °F (36 4 °C), Max:98 6 °F (37 °C)     HR:  [] 108  Resp:  [12-30] 19  BP: ()/(52-74) 104/59  SpO2:  [95 %-99 %] 95 %  Medications:   Scheduled Meds:   Current Facility-Administered Medications:  acetaminophen 650 mg Rectal Q6H PRN Geneva Ramirez MD    aspirin 325 mg Oral Daily Alex Edwards MD    cefepime 2,000 mg Intravenous Q12H Geneva Ramirez MD Last Rate: 2,000 mg (18 1048)   enoxaparin 40 mg Subcutaneous Q24H [facility] Christiana Schroeder MD    fluticasone-salmeterol 1 puff Inhalation Q12H [facility] Christiana Schroeder MD    ipratropium 0 5 mg Nebulization TID Geneva Ramirez MD    levalbuterol 1 25 mg Nebulization TID Geneva Ramirez MD    levalbuterol 1 25 mg Nebulization Q6H PRN Geneva Ramirez MD    LORazepam 0 5 mg Intravenous Q4H PRN Geneva Ramirez MD    methylPREDNISolone sodium succinate 20 mg Intravenous Daily Geneva Ramirez MD    morphine injection 2 mg Intravenous Q3H PRN Alex Edwards MD    nicotine 21 mg Transdermal Daily Geneva Ramirez MD    nitroglycerin 0 4 mg Sublingual Q5 Min PRN Alex Edwards MD    sodium chloride 3 mL Nebulization Q6H PRN Geneva Ramirez MD    vancomycin 1,000 mg Intravenous Q12H Geneva Ramirez MD Last Rate: 1,000 mg (18 1306)     Continuous Infusions:    PRN Meds:   acetaminophen    levalbuterol    LORazepam    morphine injection    nitroglycerin    sodium chloride    Abnormal Labs/Diagnostic Results:   WBC 10 69 HGB 11 4 CO2 38 ANION GAP -2 CR 0 52   Procalcitonin <=0 25 ng/ml 1 74   H    Age/Sex: 74 y o  male     Assessment/Plan:          Acute on chronic respiratory failure with hypoxia and hypercapnia (HCC)   Assessment & Plan     Likely multifactorial secondary to sepsis as well as acute on chronic severe COPD  Significantly improved, maintaining   Continue bipap as tolerated   Maintain oxygen saturation above 88%  Respiratory protocol, BiPAP at bedside  Consulted Pulmonology/Critical Care, input appreciated          * Septic shock (HCC)   Assessment & Plan     Fever 102, leukocytosis, tachycardia, tachypnea, lactic acidosis  Resolved  S/p PICC line placement   BP maintaining off pressors  Unclear source, suspected secondary to tracheobronchitis vs scrotal wound   Continue empiric treatment with cefepime and vancomycin  Procalcitonin trending down, continue to follow  ID consulted, input appreciated  Downgrade to [facility] with telemetry              COPD with acute exacerbation ([facility])   Assessment & Plan     Improved  Taper Solu-Medrol  Respiratory protocol  Continue bipap as tolerated  Pulmonology consulted, input appreciated          NSTEMI (non-ST elevated myocardial infarction) ([facility] [address] )   Assessment & Plan     Most likely type 2 secondary to hypotension  Troponin trended down   Heparin discontinued  Cardiology input appreciated             Acute encephalopathy   Assessment & Plan     Most likely multifactorial secondary to acute respiratory failure and septic shock, resolved  Continue treatment of underlying conditions  Transfer to [facility]          Chronic diastolic congestive heart failure (HCC)   Assessment & Plan     No evidence of fluid overload on exam  Increase in weight noted but respiratory status improved  Will give Lasix 20 mg IV x 1 and monitor BP closely  Cardiology consulted  Monitor daily weights, intake and output             Scrotal abscess   Assessment & Plan     Scrotal wound with tunneling noted on left lateral aspect of left testicle, improved  Urology input appreciated, Urology evaluated and signed off  Wound cx noted for MRSA and Serratia marcescens  Continue with cefepime and vancomycin  Infectious Disease consulted          Severe [facility]calorie malnutrition [facility] )   Assessment & Plan     Malnutrition Findings:   Malnutrition type: Chronic illness  Degree of Malnutrition: Other severe protein calorie malnutrition   Multiple chronic conditions including COPD, CHF, tobacco abuse     BMI Findings:  BMI Classifications: Underweight < 18 5     Body mass index is 16 05 kg/m²       Nutrition consult  Noted that patient is with good oral intake here, will therefore explore social issues at home          Tobacco abuse   Assessment & Plan      cessation  Offer nicotine patch   Discharge Plan: TBD

## 2018-05-07 NOTE — ASSESSMENT & PLAN NOTE
Fever 102, leukocytosis, tachycardia, tachypnea, lactic acidosis, hypotension unresponsive to fluids and requiring pressors   Resolved  S/p PICC line placement   BP maintaining off pressors since 5/3  Unclear source, suspected secondary to tracheobronchitis vs scrotal ulcer  Continue empiric treatment with cefepime and vancomycin  Procalcitonin trending down, continue to follow  ID consulted, input appreciated

## 2018-05-07 NOTE — ASSESSMENT & PLAN NOTE
Improving  Urology input appreciated, Urology evaluated and signed off  Wound cx noted for MRSA and Serratia marcescens  Continue with cefepime and vancomycin  Infectious Disease input appreciated, will continue to follow recommendations

## 2018-05-07 NOTE — ASSESSMENT & PLAN NOTE
POA  Possible cause of septic shock which is now resolved  Leukocytosis persist likely due to steroids, otherwise significant clinical improvement  Sputum cx noted for candida albicans unlikely to have caused clinical picture, no need to treat  Continue Cefepime/Vanco  Pulmonology on board

## 2018-05-07 NOTE — PROGRESS NOTES
Progress Note - Infectious Disease   Robbei Jose 74 y o  male MRN: [id number]  Unit/Bed#: 424-01 Encounter: [id number]      Assessment/Recommendations     74year old male with very severe COPD, presents with septic shock, acute on chronic hypoxic respiratory failure, type 2 NSTEMI  - Source of sepsis uncertain - no evidence of pneumonia on imaging, no bacteremia  Urinary tract infection unlikely given lack of symptoms and negative urinalysis  - Patient had a small polymicrobial scrotal abscess s/p drainage   - Patient is clinically improving and PCT level steadily declining     · Follow daily procalcitonin level to assess response to antibiotic therapy   · Discontinue Vancomycin and Cefepime and switch to TMP-SMX 1 DS tab PO bid to complete 7-10 days of therapy for skin, soft tissue infection  · Supportive care per primary and critical care team    Discussed with the primary service  History/Hospital Course     Subjective: The patient has no complaints  Denies fevers, chills, or sweats  Denies nausea, vomiting, or diarrhea  Interval Hospital Course:  New labs reviewed and white count is elevated  New radiology imaging reviewed and CXR appears consistent with volume overload  New cardiology testing reviewed and echo shows no vegetation  New microbiology information reviewed and Sputum cx shows C albicans and Wound cx grew MRSA, Kleb pneumo and Serratia  Antibiotics:  Vancomycin - D7  Cefepime - D7    Physical Exam     Objective:  Vitals:  HR:  [] 96  Resp:  [18] 18  BP: ()/(54-58) 97/58  SpO2:  [92 %-96 %] 93 %  Temp (24hrs), Av 2 °F (36 8 °C), Min:97 9 °F (36 6 °C), Max:98 6 °F (37 °C)  Current: Temperature: 98 6 °F (37 °C)    Physical Exam:     General Appearance:  Alert, nontoxic, no acute distress  Frail appearing   Throat: Oropharynx moist without lesions    Lips, mucosa, and tongue normal   Neck: Supple, symmetrical, trachea midline, no adenopathy,  no tenderness/mass/nodules Lungs: Air entry poor at bases  Heart:  Regular rate and rhythm, S1, S2 normal, no murmur, rub or gallop   Abdomen:   Soft, non-tender, non-distended, positive bowel sounds  No masses, no organomegaly    No CVA tenderness  Hydroceles noted but no visible ulcer or erythema   Extremities: Extremities normal, atraumatic, no clubbing, cyanosis or edema   Skin: Skin color, texture, turgor normal, no rashes or lesions  No draining wounds noted  Labs, Imaging, & Other Studies     Invasive Devices     Peripherally Inserted Central Catheter Line            PICC Line 05/02/18 4 days                Labs, Imaging, & Other studies:   All pertinent labs, imaging, pathology and other studies were personally reviewed    Results from last 7 days  Lab Units 05/07/18  0526 05/06/18  0541 05/05/18  0457   WBC Thousand/uL 19 43* 13 60* 10 69*   HEMOGLOBIN g/dL 13 5 12 9 11 4*   PLATELETS Thousands/uL 231 221 197       Results from last 7 days  Lab Units 05/07/18  0526 05/06/18  0541 05/05/18  0457  05/02/18  0442 05/01/18  0227   SODIUM mmol/L 140 140 141  < > 139 138   POTASSIUM mmol/L 4 3 4 0 4 2  < > 4 0 4 2   CHLORIDE mmol/L 100 102 105  < > 104 99*   CO2 mmol/L 41* 39* 38*  < > 30 34*   ANION GAP mmol/L -1* -1* -2*  < > 5 5   BUN mg/dL 31* 21 21  < > 23 18   CREATININE mg/dL 0 66 0 57* 0 52*  < > 0 78 1 01   EGFR ml/min/1 73sq m 95 101 105  < > 89 73   GLUCOSE RANDOM mg/dL 131 160* 129  < > 127 127   CALCIUM mg/dL 8 7 8 8 8 7  < > 8 7 9 3   AST U/L  --   --   --   --  27 21   ALT U/L  --   --   --   --  15 19   ALK PHOS U/L  --   --   --   --  56 85   TOTAL PROTEIN g/dL  --   --   --   --  5 6* 8 0   BILIRUBIN TOTAL mg/dL  --   --   --   --  0 50 0 60   < > = values in this interval not displayed  Results from last 7 days  Lab Units 05/04/18  0536 05/03/18  1638 05/01/18  1151 05/01/18  1103 05/01/18  0234 05/01/18  0227 05/01/18  4731   BLOOD CULTURE   --   --   --   --  No Growth After 5 Days   No Growth After 5 Days   --    SPUTUM CULTURE   --  1+ Growth of Candida sp  presumptively albicans*  --   --   --   --   --    GRAM STAIN RESULT   --  1+ Polys  2+ Budding yeast  --   --   --   --  1+ Polys  1+ Gram positive cocci in clusters   URINE CULTURE   --   --  No Growth <1000 cfu/mL  --   --   --   --    WOUND CULTURE   --   --   --   --   --   --  3+ Growth of Methicillin Resistant Staphylococcus aureus*  3+ Growth of Klebsiella pneumoniae*  3+ Growth of Serratia marcescens*   INFLUENZA A PCR   --   --   --  None Detected  --   --   --    INFLUENZA B PCR   --   --   --  None Detected  --   --   --    RSV PCR   --   --   --  None Detected  --   --   --    C DIFF TOXIN B  NEGATIVE for C difficle toxin by PCR    --   --   --   --   --   --        Counseling/Coordination of care: Total floor / unit time spent today 40 minutes  Greater than 50% of total time was spent with the patient and / or family counseling and / or coordination of care  A description of the counseling / coordination of care: Discussed in depth diagnosis, prognosis and work up of septic shock with patient  I also educated the patient about the importance of  compliance with medications  Labs, medical tests and imaging studies were independently reviewed by me as noted above

## 2018-05-07 NOTE — PHYSICAL THERAPY NOTE
PT treatment note      05/07/18 1158   Pain Assessment   Pain Score No Pain   Restrictions/Precautions   Other Precautions Contact/isolation  (Chair Alarm; Bed Alarm;Multiple lines;Telemetry;O2;Fall Risk)   Subjective   Subjective I'm tired and don't want to get up  Agreeable to therapy after encouragement  Bed Mobility   Supine to Sit 4  Minimal assistance   Additional items Assist x 1;Bedrails; Increased time required;Verbal cues   Additional Comments 2 L O2   Transfers   Sit to Stand 4  Minimal assistance   Additional items Assist x 1;Bedrails   Stand to Sit 4  Minimal assistance   Additional items Assist x 1; Armrests   Stand pivot 4  Minimal assistance   Ambulation/Elevation   Gait pattern (Decreased foot clearance; Short stride; Step to)   Gait Assistance 4  Minimal assist   Assistive Device Straight cane   Distance 5' bed to chair   Balance   Static Sitting Good   Dynamic Sitting Fair +   Static Standing Fair   Dynamic Standing Fair   Ambulatory Fair   Endurance Deficit   Endurance Deficit Yes   Activity Tolerance   Activity Tolerance Patient limited by fatigue   Assessment   Prognosis Good   Problem List Decreased strength;Decreased endurance; Impaired balance;Decreased mobility   Assessment Performing funcitonal mobility at (Min-CGA) level of function  Limited ambulation as pt was tired d/t lack of sleep  refused further ambulation  Fair stability with cane  Decreased endurance  Pt is in need of continued activity in PT to improve impairments and functional deficits with return to (S) to (I) LOF  Plan   Treatment/Interventions Functional transfer training;LE strengthening/ROM; Therapeutic exercise; Endurance training;Bed mobility;Gait training   Progress Progressing toward goals   Recommendation   Recommendation Short-term skilled PT   Pt  OOB with call bell within reach, scd's connected and turned on and alarm on at end of PT session

## 2018-05-07 NOTE — ASSESSMENT & PLAN NOTE
In mild exacerbation which is improving, weight decreased  Due to increased in CO2 will hold off Lasix today to avoid contraction alkalosis, monitor BMP  Cardiology consulted  Monitor daily weights, intake and output

## 2018-05-07 NOTE — SOCIAL WORK
After inter-disciplinary meeting physical therapy is recommending that pt goes to Short term rehab  Pt does not want to go to short term rehab  He thinks he is good enough to go home because he has a care giver to help take care of him

## 2018-05-07 NOTE — RESPIRATORY THERAPY NOTE
RT Protocol Note  Tonie Newby 74 y o  male MRN: [id number]  Unit/Bed#: 424-01 Encounter: [id number]    Assessment    Principal Problem:    Septic shock ([place] [address] )  Active Problems:    COPD with acute exacerbation ([place] [address] )    Acute tracheobronchitis    Tobacco abuse    Severe protein-calorie malnutrition ([place] [address] )    Acute on chronic respiratory failure with hypoxia and hypercapnia (HCC)    Scrotal ulcer    Chronic diastolic congestive heart failure (HCC)    Acute encephalopathy    NSTEMI (non-ST elevated myocardial infarction) ([place] [address] )      Home Pulmonary Medications:  Home Devices/Therapy: Home O2, Other (Comment)    Past Medical History:   Diagnosis Date    Anemia     Asthma     COPD (chronic obstructive pulmonary disease) ([place] [address] )     Coronary artery disease     Heart disease     Left wrist injury     Low testosterone     Stroke ([place] [address] )     Vitamin D deficiency      Social History     Social History    Marital status: Single     Spouse name: N/A    Number of children: N/A    Years of education: N/A     Social History Main Topics    Smoking status: Heavy Tobacco Smoker     Packs/day: 1 00     Years: 59 00     Types: Cigarettes    Smokeless tobacco: Never Used    Alcohol use No    Drug use: No    Sexual activity: Not Currently     Other Topics Concern    None     Social History Narrative    None       Subjective         Objective    Physical Exam:        Vitals:  Blood pressure 118/69, pulse 102, temperature 98 2 °F (36 8 °C), temperature source Temporal, resp  rate 16, height 5' 6" (1 676 m), weight 45 6 kg (100 lb 8 5 oz), SpO2 94 %  Results from last 7 days  Lab Units 05/02/18  1021   PH ART  7 387   PCO2 ART mm Hg 41 9   PO2 ART mm Hg 80 5   HCO3 ART mmol/L 24 6   BASE EXC ART mmol/L -0 4   O2 CONTENT ART mL/dL 17 0   O2 HGB, ARTERIAL % 94 8   ABG SOURCE  Radial, Right   WICHO TEST  Yes       Imaging and other studies: I have personally reviewed pertinent reports        O2 Device: BiPap at this time     Plan  Pt  No longer in any distress  He keeps removing treatments not taking them  Will change to PRN per protocol  Respiratory Plan:  Moderate/Severe Distress pathway, Vent/NIV/HFNC        Resp Comments: pulse ox found in room ,pt not on it, pulled

## 2018-05-07 NOTE — ASSESSMENT & PLAN NOTE
Improved  Taper Solu-Medrol to 20 mg IV daily, can likely transition to prednisone tomorrow  Respiratory protocol  Continue bipap as tolerated  Pulmonology consulted, input appreciated

## 2018-05-07 NOTE — ASSESSMENT & PLAN NOTE
Malnutrition Findings:   Malnutrition type: Chronic illness  Degree of Malnutrition: Other severe protein calorie malnutrition   Multiple chronic conditions including COPD, CHF, tobacco abuse    BMI Findings:  BMI Classifications: Underweight < 18 5     Body mass index is 16 23 kg/m²       Nutrition consult  Resolved, completing most of the meals now

## 2018-05-08 LAB
ANION GAP SERPL CALCULATED.3IONS-SCNC: 2 MMOL/L (ref 4–13)
BASOPHILS # BLD MANUAL: 0 THOUSAND/UL (ref 0–0.1)
BASOPHILS NFR MAR MANUAL: 0 % (ref 0–1)
BUN SERPL-MCNC: 27 MG/DL (ref 5–25)
CALCIUM SERPL-MCNC: 8.5 MG/DL (ref 8.3–10.1)
CHLORIDE SERPL-SCNC: 102 MMOL/L (ref 100–108)
CO2 SERPL-SCNC: 35 MMOL/L (ref 21–32)
CREAT SERPL-MCNC: 0.63 MG/DL (ref 0.6–1.3)
EOSINOPHIL # BLD MANUAL: 0.39 THOUSAND/UL (ref 0–0.4)
EOSINOPHIL NFR BLD MANUAL: 2 % (ref 0–6)
ERYTHROCYTE [DISTWIDTH] IN BLOOD BY AUTOMATED COUNT: 13.9 % (ref 11.6–15.1)
GFR SERPL CREATININE-BSD FRML MDRD: 97 ML/MIN/1.73SQ M
GLUCOSE SERPL-MCNC: 70 MG/DL (ref 65–140)
HCT VFR BLD AUTO: 38.5 % (ref 36.5–49.3)
HGB BLD-MCNC: 12.7 G/DL (ref 12–17)
LYMPHOCYTES # BLD AUTO: 1.77 THOUSAND/UL (ref 0.6–4.47)
LYMPHOCYTES # BLD AUTO: 9 % (ref 14–44)
MCH RBC QN AUTO: 31 PG (ref 26.8–34.3)
MCHC RBC AUTO-ENTMCNC: 33 G/DL (ref 31.4–37.4)
MCV RBC AUTO: 94 FL (ref 82–98)
MONOCYTES # BLD AUTO: 0.79 THOUSAND/UL (ref 0–1.22)
MONOCYTES NFR BLD: 4 % (ref 4–12)
NEUTROPHILS # BLD MANUAL: 16.74 THOUSAND/UL (ref 1.85–7.62)
NEUTS SEG NFR BLD AUTO: 85 % (ref 43–75)
PLATELET # BLD AUTO: 220 THOUSANDS/UL (ref 149–390)
PLATELET BLD QL SMEAR: ADEQUATE
PMV BLD AUTO: 10.3 FL (ref 8.9–12.7)
POTASSIUM SERPL-SCNC: 3.9 MMOL/L (ref 3.5–5.3)
RBC # BLD AUTO: 4.1 MILLION/UL (ref 3.88–5.62)
SODIUM SERPL-SCNC: 139 MMOL/L (ref 136–145)
TOTAL CELLS COUNTED SPEC: 100
WBC # BLD AUTO: 19.69 THOUSAND/UL (ref 4.31–10.16)

## 2018-05-08 PROCEDURE — 85027 COMPLETE CBC AUTOMATED: CPT | Performed by: FAMILY MEDICINE

## 2018-05-08 PROCEDURE — 99232 SBSQ HOSP IP/OBS MODERATE 35: CPT | Performed by: INTERNAL MEDICINE

## 2018-05-08 PROCEDURE — 99232 SBSQ HOSP IP/OBS MODERATE 35: CPT | Performed by: FAMILY MEDICINE

## 2018-05-08 PROCEDURE — 85007 BL SMEAR W/DIFF WBC COUNT: CPT | Performed by: FAMILY MEDICINE

## 2018-05-08 PROCEDURE — 80048 BASIC METABOLIC PNL TOTAL CA: CPT | Performed by: FAMILY MEDICINE

## 2018-05-08 RX ORDER — ACETAMINOPHEN 325 MG/1
650 TABLET ORAL EVERY 6 HOURS PRN
Status: DISCONTINUED | OUTPATIENT
Start: 2018-05-08 | End: 2018-05-09 | Stop reason: HOSPADM

## 2018-05-08 RX ADMIN — ASPIRIN 325 MG: 325 TABLET, DELAYED RELEASE ORAL at 08:42

## 2018-05-08 RX ADMIN — ACETAZOLAMIDE 250 MG: 500 INJECTION, POWDER, LYOPHILIZED, FOR SOLUTION INTRAVENOUS at 08:43

## 2018-05-08 RX ADMIN — NICOTINE 21 MG: 21 PATCH, EXTENDED RELEASE TRANSDERMAL at 08:43

## 2018-05-08 RX ADMIN — FLUTICASONE PROPIONATE AND SALMETEROL 1 PUFF: 50; 250 POWDER RESPIRATORY (INHALATION) at 08:42

## 2018-05-08 RX ADMIN — ACETAMINOPHEN 650 MG: 325 TABLET, FILM COATED ORAL at 12:20

## 2018-05-08 RX ADMIN — ACETAZOLAMIDE 250 MG: 500 INJECTION, POWDER, LYOPHILIZED, FOR SOLUTION INTRAVENOUS at 20:35

## 2018-05-08 RX ADMIN — VANCOMYCIN HYDROCHLORIDE 1000 MG: 1 INJECTION, POWDER, LYOPHILIZED, FOR SOLUTION INTRAVENOUS at 02:13

## 2018-05-08 RX ADMIN — FLUTICASONE PROPIONATE AND SALMETEROL 1 PUFF: 50; 250 POWDER RESPIRATORY (INHALATION) at 20:41

## 2018-05-08 RX ADMIN — ENOXAPARIN SODIUM 40 MG: 40 INJECTION SUBCUTANEOUS at 08:42

## 2018-05-08 NOTE — PHYSICAL THERAPY NOTE
PT Note  Pt  Refused PT  States he didn't sleep and is not getting OOB  Nursing aware of Pt  Refusal  Encouraged participation  Will continue to follow

## 2018-05-08 NOTE — PROGRESS NOTES
Progress Note - Johnny Irvin 1945, 74 y o  male MRN: [id number]    Unit/Bed#: 424-01 Encounter: [id number]    Primary Care Provider: Janine Douglas PA-C   Date and time admitted to hospital: 5/1/2018  1:16 AM        Acute encephalopathy   Assessment & Plan    Most likely multifactorial secondary to acute respiratory failure and septic shock, resolved  Continue treatment of underlying conditions          Scrotal ulcer   Assessment & Plan    Improving  Transitioned to bactrim         Acute on chronic respiratory failure with hypoxia and hypercapnia (HCC)   Assessment & Plan    Likely multifactorial secondary to sepsis as well as acute on chronic severe COPD  Significantly improved, maintaining acceptable oxygen saturation on NC, continue oxygen supplementation above 88%  Respiratory protocol  Pulmonary input appreciated         COPD with acute exacerbation (HCC)   Assessment & Plan    Improved  Steroids discontinued  Follow up with Keith Bass as outpatient         * Septic shock ([facility] )   Assessment & Plan    Fever 102, leukocytosis, tachycardia, tachypnea, lactic acidosis, hypotension unresponsive to fluids and requiring pressors   Resolved  S/p PICC line placement   BP maintaining off pressors since 5/3  Unclear source, suspected secondary to tracheobronchitis vs scrotal ulcer  Transition to bactrim and check Procalcitonin                 Progress Note - Johnny Irvin 74 y o  male MRN: [id number]    Unit/Bed#: 424-01 Encounter: [id number]      Subjective:   Seen and examined @ bedside  Complains of left foot pain that is chronic     Objective:     Vitals:   Vitals:    05/08/18 0835   BP: 107/57   Pulse: 86   Resp: 16   Temp: 97 9 °F (36 6 °C)   SpO2: 93%     Body mass index is 16 4 kg/m²      Intake/Output Summary (Last 24 hours) at 05/08/18 1154  Last data filed at 05/08/18 1101   Gross per 24 hour   Intake              360 ml   Output             1450 ml   Net            -1090 ml       Physical Exam: /57 (BP Location: Right arm)   Pulse 86   Temp 97 9 °F (36 6 °C) (Temporal)   Resp 16   Ht 5' 6" (1 676 m)   Wt 46 1 kg (101 lb 10 1 oz)   SpO2 93%   BMI 16 40 kg/m²   General appearance: alert and oriented, in no acute distress  Head: Normocephalic, without obvious abnormality, atraumatic  Lungs: clear to auscultation bilaterally  Heart: regular rate and rhythm, S1, S2 normal, no murmur, click, rub or gallop  Abdomen: soft, non-tender; bowel sounds normal; no masses,  no organomegaly  Extremities: small callus noted on left great toe laterally  Pulses: 2+ and symmetric  Neurologic: Grossly normal     Invasive Devices     Peripherally Inserted Central Catheter Line            PICC Line 05/02/18 5 days                  Results from last 7 days  Lab Units 05/08/18 0442 05/07/18  0526 05/06/18  0541   WBC Thousand/uL 19 69* 19 43* 13 60*   HEMOGLOBIN g/dL 12 7 13 5 12 9   HEMATOCRIT % 38 5 40 7 40 0   PLATELETS Thousands/uL 220 231 221         Results from last 7 days  Lab Units 05/08/18  0442 05/07/18  0526 05/06/18  0541  05/02/18  0442   SODIUM mmol/L 139 140 140  < > 139   POTASSIUM mmol/L 3 9 4 3 4 0  < > 4 0   CHLORIDE mmol/L 102 100 102  < > 104   CO2 mmol/L 35* 41* 39*  < > 30   BUN mg/dL 27* 31* 21  < > 23   CREATININE mg/dL 0 63 0 66 0 57*  < > 0 78   CALCIUM mg/dL 8 5 8 7 8 8  < > 8 7   TOTAL PROTEIN g/dL  --   --   --   --  5 6*   BILIRUBIN TOTAL mg/dL  --   --   --   --  0 50   ALK PHOS U/L  --   --   --   --  56   ALT U/L  --   --   --   --  15   AST U/L  --   --   --   --  27   GLUCOSE RANDOM mg/dL 70 131 160*  < > 127   < > = values in this interval not displayed      Medication Administration - last 24 hours from 05/07/2018 1154 to 05/08/2018 1154       Date/Time Order Dose Route Action Action by     05/08/2018 0842 fluticasone-salmeterol (ADVAIR) 250-50 mcg/dose inhaler 1 puff 1 puff Inhalation Given Tez Dias RN     05/07/2018 9633 fluticasone-salmeterol (ADVAIR) 250-50 mcg/dose inhaler 1 puff 1 puff Inhalation Given Vania Contreras RN     05/07/2018 2234 cefepime (MAXIPIME) IVPB (premix) 2,000 mg 2,000 mg Intravenous [facility]     05/07/2018 1407 levalbuterol (Irl Heath) inhalation solution 1 25 mg 1 25 mg Nebulization Given Iqra Cerna, RT     05/07/2018 1407 ipratropium (ATROVENT) 0 02 % inhalation solution 0 5 mg 0 5 mg Nebulization Given Iqra Umanzorders, RT     05/08/2018 0842 aspirin (ECOTRIN) EC tablet 325 mg 325 mg Oral Given Kaylee Morrissey RN     05/08/2018 0843 nicotine (NICODERM CQ) 21 mg/24 hr TD 24 hr patch 21 mg 21 mg Transdermal Medication Applied Kaylee Morrissey RN     05/08/2018 8437 nicotine (NICODERM CQ) 21 mg/24 hr TD 24 hr patch 21 mg 21 mg Transdermal Patch Removed Kaylee Morrissey RN     05/08/2018 0213 vancomycin (VANCOCIN) 1,000 mg in sodium chloride 0 9 % 250 mL IVPB 1,000 mg Intravenous [facility]     05/07/2018 1306 vancomycin (VANCOCIN) 1,000 mg in sodium chloride 0 9 % 250 mL IVPB 1,000 mg Intravenous [address], RN     05/08/2018 2326 enoxaparin (LOVENOX) subcutaneous injection 40 mg 40 mg Subcutaneous Given Kaylee Morrissey RN     05/08/2018 2447 acetaZOLAMIDE (DIAMOX) injection 250 mg 250 mg Intravenous Given Kaylee Morrissey RN     05/07/2018 2221 acetaZOLAMIDE (DIAMOX) injection 250 mg 250 mg Intravenous Given Vania Contreras RN            Lab, Imaging and other studies: I have personally reviewed pertinent reports      VTE Pharmacologic Prophylaxis: Enoxaparin (Lovenox)  VTE Mechanical Prophylaxis: sequential compression device     Silas Fleming MD  5/8/2018,11:54 AM

## 2018-05-08 NOTE — PROGRESS NOTES
Cardiology Progress Note - Kendrick Posey 74 y o  male MRN: [id number]    Unit/Bed#: 424-01 Encounter: [id number]    Assessment and plan   1  Acute on chronic hypoxemic respiratory failure   2  Septic shock  Unclear source suspect pneumonia   3  COPD with acute exacerbation   4  Non STEMI type 2 secondary to hypotension and sepsis   4  Chronic diastolic congestive heart failure   5  Aortic stenosis moderate     Recommendations:   Overall he is stable from a cardiac standpoint  No further cardiac testing  Aortic stenosis appears moderate  No signs of volume overload on exam    Continue aspirin for now  Overall ejection fraction maintained  No further cardiac testing  Subjective:    No significant events overnight  Complains of feeling generalized weakness and fatigue  No chest pain or dyspnea  Patient is not on telemetry  ROS    Objective:   Vitals: Blood pressure 107/57, pulse 86, temperature 97 9 °F (36 6 °C), temperature source Temporal, resp  rate 16, height 5' 6" (1 676 m), weight 46 1 kg (101 lb 10 1 oz), SpO2 93 %  , Body mass index is 16 4 kg/m² , Orthostatic Blood Pressures      Most Recent Value   Blood Pressure  107/57 filed at 05/08/2018 0835   Patient Position - Orthostatic VS  Lying filed at 05/08/2018 6749         Systolic (54EHN), KLM:954 , Min:107 , IAI:217     Diastolic (68VYK), GWC:57, Min:57, Max:69      Intake/Output Summary (Last 24 hours) at 05/08/18 0917  Last data filed at 05/08/18 0221   Gross per 24 hour   Intake              600 ml   Output             1000 ml   Net             -400 ml     Weight (last 2 days)     Date/Time   Weight    05/08/18 0600  46 1 (101 63)    05/07/18 0532  45 6 (100 53)    05/06/18 0549  48 6 (107 14)                Telemetry Review: No significant arrhythmias seen on telemetry review  EKG personally reviewed by Roman Duque DO  Physical Exam   Constitutional: He is oriented to person, place, and time   He appears well-nourished  No distress  HENT:   Head: Atraumatic  Eyes: Conjunctivae are normal  Pupils are equal, round, and reactive to light  Neck: Neck supple  Cardiovascular: Normal rate, regular rhythm and normal heart sounds  Exam reveals no friction rub  No murmur heard  Pulmonary/Chest: Effort normal  No respiratory distress  He has decreased breath sounds  He has no wheezes  He has rhonchi  He has no rales  Abdominal: Bowel sounds are normal  He exhibits no distension  There is no tenderness  There is no rebound  Musculoskeletal: He exhibits no edema  Neurological: He is alert and oriented to person, place, and time  No cranial nerve deficit  Skin: Skin is warm and dry  No erythema  Nursing note and vitals reviewed          Laboratory Results:    Results from last 7 days  Lab Units 05/04/18  0541 05/02/18  1249 05/02/18  1011   TROPONIN I ng/mL 1 08* 2 66* 2 58*       CBC with diff:   Results from last 7 days  Lab Units 05/08/18  0442 05/07/18  0526 05/06/18  0541 05/05/18  0457 05/04/18  0541 05/03/18  0523 05/02/18  0442   WBC Thousand/uL 19 69* 19 43* 13 60* 10 69* 12 47* 17 18* 13 84*   HEMOGLOBIN g/dL 12 7 13 5 12 9 11 4* 11 1* 12 4 12 9   HEMATOCRIT % 38 5 40 7 40 0 35 6* 34 6* 37 4 39 0   MCV fL 94 93 94 95 95 94 94   PLATELETS Thousands/uL 220 231 221 197 189 231 206   MCH pg 31 0 30 9 30 1 30 4 30 6 31 2 31 1   MCHC g/dL 33 0 33 2 32 3 32 0 32 1 33 2 33 1   RDW % 13 9 13 9 13 5 13 7 13 6 13 3 13 5   MPV fL 10 3 10 2 10 3 10 7 10 2 10 4 10 1         CMP:  Results from last 7 days  Lab Units 05/08/18  0442 05/07/18  0526 05/06/18  0541 05/05/18  0457 05/04/18  0541 05/03/18  0524 05/02/18  0442   SODIUM mmol/L 139 140 140 141 140 139 139   POTASSIUM mmol/L 3 9 4 3 4 0 4 2 4 2 3 8 4 0   CHLORIDE mmol/L 102 100 102 105 105 104 104   CO2 mmol/L 35* 41* 39* 38* 35* 32 30   ANION GAP mmol/L 2* -1* -1* -2* 0* 3* 5   BUN mg/dL 27* 31* 21 21 18 23 23   CREATININE mg/dL 0 63 0 66 0 57* 0 52* 0 54* 0  76 0 78   GLUCOSE RANDOM mg/dL 70 131 160* 129 122 187* 127   CALCIUM mg/dL 8 5 8 7 8 8 8 7 8 6 8 5 8 7   AST U/L  --   --   --   --   --   --  27   ALT U/L  --   --   --   --   --   --  15   ALK PHOS U/L  --   --   --   --   --   --  56   TOTAL PROTEIN g/dL  --   --   --   --   --   --  5 6*   BILIRUBIN TOTAL mg/dL  --   --   --   --   --   --  0 50   EGFR ml/min/1 73sq m 97 95 101 105 103 90 89         BMP:  Results from last 7 days  Lab Units 18  0442 18  0526 18  0541 18  0457 18  0541 18  0524 18  0442   SODIUM mmol/L 139 140 140 141 140 139 139   POTASSIUM mmol/L 3 9 4 3 4 0 4 2 4 2 3 8 4 0   CHLORIDE mmol/L 102 100 102 105 105 104 104   CO2 mmol/L 35* 41* 39* 38* 35* 32 30   BUN mg/dL 27* 31* 21 21 18 23 23   CREATININE mg/dL 0 63 0 66 0 57* 0 52* 0 54* 0 76 0 78   GLUCOSE RANDOM mg/dL 70 131 160* 129 122 187* 127   CALCIUM mg/dL 8 5 8 7 8 8 8 7 8 6 8 5 8 7       BNP: No results for input(s): BNP in the last 72 hours  Magnesium:   Results from last 7 days  Lab Units 18  0442   MAGNESIUM mg/dL 1 7       Coags:   Results from last 7 days  Lab Units 18  0457 18  0541 18  2328 18  1638 18  0908 18  0232 18  1945  18  0442   PTT seconds 33 64* 62* 54* 45* 56* 51*  < > 32   INR   --   --   --   --   --   --   --   --  1 17*   < > = values in this interval not displayed      TSH:        Hemoglobin A1C       Lipid Profile:       Cardiac testing:   Results for orders placed during the hospital encounter of 18   Echo complete with contrast if indicated    Narrative [address] [place] [address]  [phone]    Transthoracic Echocardiogram  2D, M-mode, Doppler, and Color Doppler    Study date:  02-May-2018    Patient: Lindajo Sever  MR number: [id number]  Account number: [de-identified]  : 1945  Age: 74 years  Gender: Male  Status: Inpatient  Location: Bedside  Height: 66 in  Weight: 97 lb  BP: 80/ 51 mmHg    Indications: sepsis    Diagnoses: 038 8 - SEPTICEMIA NEC, 786 05 - SHORTNESS OF BREATH    Sonographer:  Checo Billings [facility], CCT  Primary Physician:  Purvi Bain PA-C  Referring Physician:  John Almaraz DO  Group:  [id number]  Interpreting Physician:  Andra Gutierrez MD    SUMMARY    PROCEDURE INFORMATION:  This was a technically difficult study  LEFT VENTRICLE:  Size was normal   Systolic function was normal  Ejection fraction was estimated to be 55 %  This study was inadequate for the evaluation of regional wall motion  Wall thickness was normal     MITRAL VALVE:  There was mild thickening  AORTIC VALVE:  There was moderate stenosis  Valve peak gradient was 31 05 mmHg  Valve mean gradient was 20 24 mmHg  TRICUSPID VALVE:  There was mild regurgitation  Estimated peak PA pressure was 25 mmHg  PROCEDURE: The procedure was performed at the bedside  This was a routine study  The transthoracic approach was used  The study included complete 2D imaging, M-mode, complete spectral Doppler, and color Doppler  The heart rate was 70 bpm,  at the start of the study  Echocardiographic views were limited due to combative patient  This was a technically difficult study  LEFT VENTRICLE: Size was normal  Systolic function was normal  Ejection fraction was estimated to be 55 %  This study was inadequate for the evaluation of regional wall motion  Wall thickness was normal     RIGHT VENTRICLE: The size was normal  Systolic function was normal  Wall thickness was normal     LEFT ATRIUM: Size was normal     RIGHT ATRIUM: Size was normal     MITRAL VALVE: There was mild thickening  DOPPLER: There was no significant regurgitation  AORTIC VALVE: The valve was not well visualized  DOPPLER: There was moderate stenosis  TRICUSPID VALVE: DOPPLER: There was mild regurgitation  Estimated peak PA pressure was 25 mmHg      PULMONIC VALVE: Not well visualized  PERICARDIUM: There was no pericardial effusion  The pericardium was normal in appearance  AORTA: The root exhibited normal size  MEASUREMENT TABLES    DOPPLER MEASUREMENTS  Aortic valve   (Reference normals)  Peak gradient   31 05 mmHg   (--)  Mean gradient   20 24 mmHg   (--)    SYSTEM MEASUREMENT TABLES    2D  %FS: 19 48 %  Ao Diam: 2 51 cm  EDV(Teich): 96 55 ml  EF(Teich): 40 14 %  ESV(Teich): 57 79 ml  IVSd: 0 94 cm  LA Diam: 3 2 cm  LVEDV MOD A4C: 61 86 ml  LVEF MOD A4C: 60 54 %  LVESV MOD A4C: 24 41 ml  LVIDd: 4 58 cm  LVIDs: 3 69 cm  LVLd A4C: 7 92 cm  LVLs A4C: 6 63 cm  LVOT Diam: 2 15 cm  LVPWd: 0 76 cm  SV MOD A4C: 37 45 ml  SV(Teich): 38 75 ml    CW  AV Env  Ti: 306 23 ms  AV VTI: 65 01 cm  AV Vmax: 2 79 m/s  AV Vmax: 2 78 m/s  AV Vmean: 2 12 m/s  AV maxP 05 mmHg  AV maxP 84 mmHg  AV meanP 24 mmHg  TR Vmax: 2 43 m/s  TR maxP 58 mmHg    PW  TELLY (VTI): 1 06 cm2  TELLY Vmax: 0 99 cm2  TELLY Vmax: 0 99 cm2  TELLY Vmax, Pt: 1 01 cm2  TELLY Vmax, Pt: 1 01 cm2  E' Sept: 0 07 m/s  E/E' Sept: 11 65  LVOT Env  Ti: 391 ms  LVOT VTI: 19 12 cm  LVOT Vmax: 0 76 m/s  LVOT Vmax: 0 78 m/s  LVOT Vmean: 0 49 m/s  LVOT maxP 32 mmHg  LVOT maxP 41 mmHg  LVOT meanP 12 mmHg  LVSV Dopp: 69 19 ml  MV A Ren: 0 92 m/s  MV Dec Merrick: 4 9 m/s2  MV DecT: 173 17 ms  MV E Ren: 0 85 m/s  MV E/A Ratio: 0 92    [employer] Accredited Echocardiography Laboratory    Prepared and electronically signed by    Manny Ingram MD  Signed 02-May-2018 15:27:00               Meds/Allergies   all current active meds have been reviewed  Prescriptions Prior to Admission   Medication    ergocalciferol (VITAMIN D2) 93,547 units    folic acid (FOLVITE) 1 mg tablet    Ipratropium-Albuterol (COMBIVENT RESPIMAT)  MCG/ACT AERS    Mometasone Furo-Formoterol Fum (DULERA) 200-5 MCG/ACT AERO    predniSONE 10 mg tablet    dextromethorphan-guaiFENesin (ROBITUSSIN DM)  mg/5 mL syrup    Incontinence Supply Disposable ([employer] CLEANSING WIPES) MISC    Incontinence Supply Disposable (DEPEND GUARDS FOR MEN) MISC    Incontinence Supply Disposable (DEPEND UNDERGARMENTS) MISC    ipratropium-albuterol (COMBIVENT RESPIMAT) inhaler          Assessment:  Principal Problem:    Septic shock (HCC)  Active Problems:    COPD with acute exacerbation (HCC)    Acute tracheobronchitis    Tobacco abuse    Severe protein-calorie malnutrition (HCC)    Acute on chronic respiratory failure with hypoxia and hypercapnia (HCC)    Scrotal ulcer    Chronic diastolic congestive heart failure (HCC)    Acute encephalopathy    NSTEMI (non-ST elevated myocardial infarction) ([facility]

## 2018-05-08 NOTE — ASSESSMENT & PLAN NOTE
Likely multifactorial secondary to sepsis as well as acute on chronic severe COPD  Significantly improved, maintaining acceptable oxygen saturation on NC, continue oxygen supplementation above 88%  Respiratory protocol  Pulmonary input appreciated

## 2018-05-08 NOTE — ASSESSMENT & PLAN NOTE
Fever 102, leukocytosis, tachycardia, tachypnea, lactic acidosis, hypotension unresponsive to fluids and requiring pressors   Resolved  S/p PICC line placement   BP maintaining off pressors since 5/3  Unclear source, suspected secondary to tracheobronchitis vs scrotal ulcer  Transition to bactrim and check Procalcitonin

## 2018-05-09 VITALS
SYSTOLIC BLOOD PRESSURE: 123 MMHG | HEIGHT: 66 IN | WEIGHT: 99.87 LBS | DIASTOLIC BLOOD PRESSURE: 87 MMHG | RESPIRATION RATE: 18 BRPM | BODY MASS INDEX: 16.05 KG/M2 | TEMPERATURE: 97.4 F | OXYGEN SATURATION: 95 % | HEART RATE: 112 BPM

## 2018-05-09 LAB
ANION GAP SERPL CALCULATED.3IONS-SCNC: 3 MMOL/L (ref 4–13)
BASOPHILS # BLD MANUAL: 0 THOUSAND/UL (ref 0–0.1)
BASOPHILS NFR MAR MANUAL: 0 % (ref 0–1)
BUN SERPL-MCNC: 24 MG/DL (ref 5–25)
CALCIUM SERPL-MCNC: 8.1 MG/DL (ref 8.3–10.1)
CHLORIDE SERPL-SCNC: 102 MMOL/L (ref 100–108)
CO2 SERPL-SCNC: 32 MMOL/L (ref 21–32)
CREAT SERPL-MCNC: 0.63 MG/DL (ref 0.6–1.3)
EOSINOPHIL # BLD MANUAL: 0.17 THOUSAND/UL (ref 0–0.4)
EOSINOPHIL NFR BLD MANUAL: 1 % (ref 0–6)
ERYTHROCYTE [DISTWIDTH] IN BLOOD BY AUTOMATED COUNT: 13.9 % (ref 11.6–15.1)
GFR SERPL CREATININE-BSD FRML MDRD: 97 ML/MIN/1.73SQ M
GLUCOSE SERPL-MCNC: 97 MG/DL (ref 65–140)
HCT VFR BLD AUTO: 39.8 % (ref 36.5–49.3)
HGB BLD-MCNC: 13.1 G/DL (ref 12–17)
LYMPHOCYTES # BLD AUTO: 12 % (ref 14–44)
LYMPHOCYTES # BLD AUTO: 2.06 THOUSAND/UL (ref 0.6–4.47)
MCH RBC QN AUTO: 30.6 PG (ref 26.8–34.3)
MCHC RBC AUTO-ENTMCNC: 32.9 G/DL (ref 31.4–37.4)
MCV RBC AUTO: 93 FL (ref 82–98)
MONOCYTES # BLD AUTO: 1.03 THOUSAND/UL (ref 0–1.22)
MONOCYTES NFR BLD: 6 % (ref 4–12)
NEUTROPHILS # BLD MANUAL: 13.88 THOUSAND/UL (ref 1.85–7.62)
NEUTS SEG NFR BLD AUTO: 81 % (ref 43–75)
PLATELET # BLD AUTO: 230 THOUSANDS/UL (ref 149–390)
PLATELET BLD QL SMEAR: ADEQUATE
PMV BLD AUTO: 10.2 FL (ref 8.9–12.7)
POTASSIUM SERPL-SCNC: 3.6 MMOL/L (ref 3.5–5.3)
PROCALCITONIN SERPL-MCNC: 0.15 NG/ML
RBC # BLD AUTO: 4.28 MILLION/UL (ref 3.88–5.62)
SODIUM SERPL-SCNC: 137 MMOL/L (ref 136–145)
TOTAL CELLS COUNTED SPEC: 100
WBC # BLD AUTO: 17.13 THOUSAND/UL (ref 4.31–10.16)

## 2018-05-09 PROCEDURE — 85007 BL SMEAR W/DIFF WBC COUNT: CPT | Performed by: FAMILY MEDICINE

## 2018-05-09 PROCEDURE — 80048 BASIC METABOLIC PNL TOTAL CA: CPT | Performed by: FAMILY MEDICINE

## 2018-05-09 PROCEDURE — 99239 HOSP IP/OBS DSCHRG MGMT >30: CPT | Performed by: FAMILY MEDICINE

## 2018-05-09 PROCEDURE — 94760 N-INVAS EAR/PLS OXIMETRY 1: CPT

## 2018-05-09 PROCEDURE — 97530 THERAPEUTIC ACTIVITIES: CPT

## 2018-05-09 PROCEDURE — 84145 PROCALCITONIN (PCT): CPT | Performed by: FAMILY MEDICINE

## 2018-05-09 PROCEDURE — 94640 AIRWAY INHALATION TREATMENT: CPT

## 2018-05-09 PROCEDURE — 85027 COMPLETE CBC AUTOMATED: CPT | Performed by: FAMILY MEDICINE

## 2018-05-09 RX ORDER — ASPIRIN 81 MG/1
81 TABLET ORAL DAILY
Refills: 0
Start: 2018-05-09

## 2018-05-09 RX ORDER — SULFAMETHOXAZOLE AND TRIMETHOPRIM 800; 160 MG/1; MG/1
1 TABLET ORAL EVERY 12 HOURS SCHEDULED
Qty: 8 TABLET | Refills: 0 | Status: SHIPPED | OUTPATIENT
Start: 2018-05-09 | End: 2018-05-13

## 2018-05-09 RX ADMIN — LEVALBUTEROL 1.25 MG: 1.25 SOLUTION, CONCENTRATE RESPIRATORY (INHALATION) at 03:56

## 2018-05-09 RX ADMIN — ACETAMINOPHEN 650 MG: 325 TABLET, FILM COATED ORAL at 03:31

## 2018-05-09 RX ADMIN — ASPIRIN 325 MG: 325 TABLET, DELAYED RELEASE ORAL at 09:38

## 2018-05-09 RX ADMIN — NICOTINE 21 MG: 21 PATCH, EXTENDED RELEASE TRANSDERMAL at 09:38

## 2018-05-09 RX ADMIN — IPRATROPIUM BROMIDE 0.5 MG: 0.5 SOLUTION RESPIRATORY (INHALATION) at 03:56

## 2018-05-09 RX ADMIN — ACETAZOLAMIDE 250 MG: 500 INJECTION, POWDER, LYOPHILIZED, FOR SOLUTION INTRAVENOUS at 09:38

## 2018-05-09 RX ADMIN — ENOXAPARIN SODIUM 40 MG: 40 INJECTION SUBCUTANEOUS at 09:38
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
## 2018-05-09 NOTE — CASE MANAGEMENT
Continued Stay Review    Date: 5/9/18 (NOTE FROM 5/8- NO NOTE YET FOR 5/9)    Vital Signs: /87 (BP Location: Right arm)   Pulse (!) 112   Temp (!) 97 4 °F (36 3 °C) (Temporal)   Resp 18   Ht 5' 6" (1 676 m)   Wt 45 3 kg (99 lb 13 9 oz)   SpO2 95%   BMI 16 12 kg/m²     Medications:   Scheduled Meds:   Current Facility-Administered Medications:  acetaminophen 650 mg Oral Q6H PRN Dali Blake MD   aspirin 325 mg Oral Daily Allyson Cannon MD   enoxaparin 40 mg Subcutaneous Q24H [facility] Christiana Schoreder MD   fluticasone-salmeterol 1 puff Inhalation Q12H [facility] Christiana Schroeder MD   ipratropium 0 5 mg Nebulization Q6H PRN Tommy Campbell MD   levalbuterol 1 25 mg Nebulization Q6H PRN Tommy Campbell MD   LORazepam 0 5 mg Intravenous Q4H PRN Tommy Campbell MD   morphine injection 2 mg Intravenous Q3H PRN Allyson Cannon MD   nicotine 21 mg Transdermal Daily Tommy Campbell MD   nitroglycerin 0 4 mg Sublingual Q5 Min PRN Allyson Cannon MD     Continuous Infusions:    PRN Meds:   Acetaminophen X2    Ipratropium X1    levalbuterol X1    LORazepam    morphine injection    nitroglycerin    Abnormal Labs/Diagnostic Results:   Lab Units 05/08/18  0442 05/07/18  0526 05/06/18  0541   WBC Thousand/uL 19 69* 19 43* 13 60*   HEMOGLOBIN g/dL 12 7 13 5 12 9   HEMATOCRIT % 38 5 40 7 40 0   PLATELETS Thousands/uL 220 231 221            Results from last 7 days  Lab Units 05/08/18  0442 05/07/18  0526 05/06/18  0541   05/02/18  0442   SODIUM mmol/L 139 140 140  < > 139   POTASSIUM mmol/L 3 9 4 3 4 0  < > 4 0   CHLORIDE mmol/L 102 100 102  < > 104   CO2 mmol/L 35* 41* 39*  < > 30   BUN mg/dL 27* 31* 21  < > 23   CREATININE mg/dL 0 63 0 66 0 57*  < > 0 78   CALCIUM mg/dL 8 5 8 7 8 8  < > 8 7   TOTAL PROTEIN g/dL  --   --   --   --  5 6*   BILIRUBIN TOTAL mg/dL  --   --   --   --  0 50   ALK PHOS U/L  --   --   --   --  56   ALT U/L  --   --   --   --  15   AST U/L  --   --   --   --  27   GLUCOSE RANDOM mg/dL 70 131 160*  < > 127   < > = values in this interval not displayed        Age/Sex: 74 y o  male     Assessment/Plan:        Acute encephalopathy   Assessment & Plan     Most likely multifactorial secondary to acute respiratory failure and septic shock, resolved  Continue treatment of underlying conditions             Scrotal ulcer   Assessment & Plan     Improving  Transitioned to bactrim           Acute on chronic respiratory failure with hypoxia and hypercapnia (HCC)   Assessment & Plan     Likely multifactorial secondary to sepsis as well as acute on chronic severe COPD  Significantly improved, maintaining acceptable oxygen saturation on NC, continue oxygen supplementation above 88%  Respiratory protocol  Pulmonary input appreciated           COPD with acute exacerbation (HCC)   Assessment & Plan     Improved  Steroids discontinued  Follow up with Ellen Norwood as outpatient           * Septic shock (HCC)   Assessment & Plan     Fever 102, leukocytosis, tachycardia, tachypnea, lactic acidosis, hypotension unresponsive to fluids and requiring pressors   Resolved  S/p PICC line placement   BP maintaining off pressors since 5/3  Unclear source, suspected secondary to tracheobronchitis vs scrotal ulcer  Transition to bactrim and check Procalcitonin                Discharge Plan: TO BE DETERMINED

## 2018-05-09 NOTE — OCCUPATIONAL THERAPY NOTE
OT Note     05/09/18 1121   Restrictions/Precautions   Other Precautions Contact/isolation; Chair Alarm; Bed Alarm;O2;Fall Risk   ADL   LB Dressing Assistance 1  Total Assistance   LB Dressing Deficit Don/doff R shoe;Don/doff L shoe   Bed Mobility   Supine to Sit 5  Supervision   Transfers   Sit to Stand 4  Minimal assistance   Additional items Assist x 1   Stand to Sit 5  Supervision   Additional items Armrests   Functional Mobility   Functional Mobility 4  Minimal assistance   Additional Comments Completes txfr EOB to recliner at bedside   Additional items (Declines use A device)   Activity Tolerance   Activity Tolerance (Tolerates tx session)   Assessment   Assessment Presents supine, agreeable to OOB to recliner  Completes as per above  SCDs applied and turned on, chair alarm activated  Call bell and phone in reach     Recommendation   Recommendation (Continue OT services )

## 2018-05-09 NOTE — DISCHARGE SUMMARY
Discharge Summary - Bridger Jerez 74 y o  male MRN: [id number]    Unit/Bed#: 424-01 Encounter: [id number]    Admission Date:   Admission Orders     Ordered        05/01/18 0837  Inpatient Admission  Once               Admitting Diagnosis: SOB (shortness of breath) [R06 02]  Scrotal abscess [N49 2]  Abscess of scrotal wall [N49 2]  Acute on chronic respiratory failure with hypoxia and hypercapnia (HCC) [J96 21, J96 22]    HPI: Bridger Jerez is a 74 y o  male who presents with shortness of breath  The patient was noted to be a poor historian the emergency room  He was placed on supplemental oxygen at 6 L via nasal cannula however, unfortunately, he has become confused and required BiPAP for improvement in his respiratory and mental status  On my encounter with the patient he is awake but lethargic, able to provide very limited history  Other than shortness of breath he does not provide other complaints or information  He denies any pain  He is noted to have a scrotal wound, however, he denies any associated pain  He is noted to be febrile on his arrival to the ICU, he is unable to a detail whether not he has been having fevers at home  Attempts to contact patient's primary listed contacts unsuccessful so far  Per ER documentation, patient apparently has baseline poor respiratory status  Procedures Performed:   Orders Placed This Encounter   Procedures    ED ECG Documentation Only    Incision and Drainage       Summary of Hospital Course:     Sepsis with septic shock POA 2/2 Scrotal abscess :  Patient is placed on broad-spectrum antibiotics, consultation with Urology and Infectious Disease was obtained  Ultimately decision was toe switch the patient to Bactrim DS 1 tab p o  b i d  to complete a 10 day course    Procalcitonin was trended and showed improvement after transitioned to Bactrim    Acute on chronic respiratory failure with hypoxia / COPD with acute exacerbation:  The patient has a history of severe COPD, he was placed on BiPAP, Pulmonary consult obtained, he was placed Solu-Medrol and frequent nebulizer treatments  Ultimately he improved with discontinuation of steroids and returned to baseline respiratory status    Non ST-elevation MI type 2 /chronic systolic and diastolic congestive heart failure:  Consultation with Cardiology was obtained, patient was placed on aspirin, he was initially placed on heparin drip which was subsequently discontinued  He initially required IV fluids  Acute Encephalopathy:  Multifactorial, resolved      The patient also had a cough likely contributing to his respiratory status  This was a either the likely source of his sepsis however I do feel that was less likely and more likely of the scrotal abscess  Significant Findings, Care, Treatment and Services Provided:     CXR : 1   Mild vascular congestion  2   Bilateral perihilar and lower lobe infiltrates, likely cardiogenic in nature   Follow-up recommended to rule out the presence of superimposed pneumonia  Complications: none    Discharge Diagnosis:   See above    Resolved Problems  Date Reviewed: 5/7/2018    None          Condition at Discharge: fair         Discharge instructions/Information to patient and family:   See after visit summary for information provided to patient and family  Provisions for Follow-Up Care:  See after visit summary for information related to follow-up care and any pertinent home health orders  PCP: Sheryl Norris PA-C    Disposition: Home    Planned Readmission: No    Discharge Statement   I spent 60 minutes discharging the patient  This time was spent on the day of discharge  I had direct contact with the patient on the day of discharge  Additional documentation is required if more than 30 minutes were spent on discharge  Discharge Medications:  See after visit summary for reconciled discharge medications provided to patient and family

## 2018-05-11 RX ORDER — ALBUTEROL SULFATE 2.5 MG/3ML
SOLUTION RESPIRATORY (INHALATION)
Refills: 0 | COMMUNITY
Start: 2018-03-03

## 2018-05-11 RX ORDER — BENZONATATE 200 MG/1
1 CAPSULE ORAL
COMMUNITY
Start: 2017-12-08

## 2018-05-14 ENCOUNTER — OFFICE VISIT (OUTPATIENT)
Dept: PULMONOLOGY | Facility: HOSPITAL | Age: 73
End: 2018-05-14
Payer: COMMERCIAL

## 2018-05-14 VITALS
BODY MASS INDEX: 15.59 KG/M2 | WEIGHT: 97 LBS | SYSTOLIC BLOOD PRESSURE: 118 MMHG | OXYGEN SATURATION: 93 % | HEIGHT: 66 IN | DIASTOLIC BLOOD PRESSURE: 60 MMHG | HEART RATE: 117 BPM

## 2018-05-14 DIAGNOSIS — J44.9 COPD (CHRONIC OBSTRUCTIVE PULMONARY DISEASE) (HCC): Primary | ICD-10-CM

## 2018-05-14 PROCEDURE — 4040F PNEUMOC VAC/ADMIN/RCVD: CPT | Performed by: INTERNAL MEDICINE

## 2018-05-14 PROCEDURE — 99214 OFFICE O/P EST MOD 30 MIN: CPT | Performed by: INTERNAL MEDICINE

## 2018-05-14 NOTE — PROGRESS NOTES
Office Progress Note - Pulmonary    Emmanuel Clveeland 74 y o  male MRN: [id number]    Encounter: [id number]      Assessment:  · Chronic obstructive pulmonary disease  · Chronic hypoxemic respiratory failure  · Failure to thrive  Plan:   · Albuterol/ipratropium nebulized q 6 hours  · Oxygen supplement  · Consult hospice liaison  · Follow-up as needed  Discussion: It seems the patient has overall health is declining  He is getting weaker and has lost weight  He even has a weak cough  He was not in distress today though  The patient wishes to proceed with hospice care at home where he lives now with his friend Toma Antonio  I have confirmed this which from the patient himself  We will consult the  and the hospice liaison  For now to continue the doing nebs every 6 hours  Continue the oxygen supplement for comfort  Subjective: The patient is here for a follow-up visit  He has been living with his friend Toma Antonio for about a year  He had frequent hospitalizations over the last few months  He has been losing weight  He is wheelchair bound  His breathing for the most part is stable  He is abusing his inhaler according to the friend  She has hiding his cigarettes away from him  However the patient is overall is becoming weaker  Today she said that the patient wants to proceed with hospice care and be comfortable and diet her home  Review of systems:  A 12 point system review is done and aside from what is stated above the rest of the review of systems is negative  Vitals: Blood pressure 118/60, pulse (!) 117, height 5' 6" (1 676 m), weight 44 kg (97 lb), SpO2 93 %  ,     Physical Exam  Gen: Awake, alert, oriented x 3, no acute distress  HEENT: Mucous membranes moist, no oral lesions, no thrush  NECK: No accessory muscle use, JVP not elevated  Cardiac: Regular, single S1, single S2, no murmurs, no rubs, no gallops  Lungs:  Decreased breath sounds    No wheezing or rhonchi  Abdomen: normoactive bowel sounds, soft nontender, nondistended, no rebound or rigidity, no guarding  Extremities: no cyanosis, no clubbing, no edema  Neuro:  Left-sided weakness  Skin:  No rash

## 2018-09-02 NOTE — PROGRESS NOTES
Progress Note - Pulmonary   [place] 74 y o  male MRN: [id number]  Unit/Bed#: 424-01 Encounter: [id number]      Assessment & Recommendations:  1  Sepsis with Septic Shock - POA - possibly secondary to scrotal abscess  · Remains off vasopressors, shock state resolved  · Continue antibiotics per ID and serial PCT checks    2  Very severe COPD with possible acute exacerbation  · Stop solumedrol now  · Continue advair - return to [facility] at discharge  · Continue duonebs three times daily  · Follow up with Dr Carlin Walter at discharge    3  Acute on chronic hypoxic and hypercarbic respiratory failure  · Continue O2 at 3L/min and titrate to keep SpO2 88-94%  · Trial of Diamox now and trend serum bicarb daily  · Can stop Diamox once serum bicarb is less than 40  · He has declined further trials of BiPAP therapy    4  Severe protein calorie malnutrition    5  Active tobacco abuse - tobacco cessation encouraged, cont NRT    Subjective:   Admission events, history and hospital course reviewed  He has been weaned off vasopressors and transferred out of ICU  He reports respiratory status is now at baseline, no sputum production, no hemoptysis, no pleurisy    Objective:     Vitals: Blood pressure 97/58, pulse 96, temperature 98 6 °F (37 °C), temperature source Temporal, resp  rate 18, height 5' 6" (1 676 m), weight 45 6 kg (100 lb 8 5 oz), SpO2 96 %  , 3LNC, Body mass index is 16 23 kg/m²        Intake/Output Summary (Last 24 hours) at 05/07/18 1618  Last data filed at 05/07/18 1501   Gross per 24 hour   Intake              600 ml   Output              825 ml   Net             -225 ml         Physical Exam  Gen: Elderly disheveled male, awake and conversant, no acute distress  HEENT: Mucous membranes moist, no oral lesions, no thrush  NECK: No accessory muscle use, JVP not elevated  Cardiac: Regular, single S1, single S2, no murmurs, no rubs, no gallops  Lungs: very diminished BS diffusely, no wheeze, no rales  Abdomen: normoactive bowel sounds, soft nontender, nondistended, no rebound or rigidity, no guarding  Extremities: no edema, very poor muscle mass    Labs: I have personally reviewed pertinent lab results  Results from last 7 days  Lab Units 05/07/18 0526 05/06/18 0541 05/05/18 0457 05/01/18 0227   WBC Thousand/uL 19 43* 13 60* 10 69*  < > 14 41*   HEMOGLOBIN g/dL 13 5 12 9 11 4*  < > 15 9   HEMATOCRIT % 40 7 40 0 35 6*  < > 48 6   PLATELETS Thousands/uL 231 221 197  < > 243   NEUTROS PCT %  --   --   --   --  86*   MONOS PCT %  --   --   --   --  10   MONO PCT MAN % 12 5 2*  < >  --    < > = values in this interval not displayed  Results from last 7 days  Lab Units 05/07/18 0526 05/06/18 0541 05/05/18 0457 05/02/18 0442 05/01/18 0227   SODIUM mmol/L 140 140 141  < > 139 138   POTASSIUM mmol/L 4 3 4 0 4 2  < > 4 0 4 2   CHLORIDE mmol/L 100 102 105  < > 104 99*   CO2 mmol/L 41* 39* 38*  < > 30 34*   BUN mg/dL 31* 21 21  < > 23 18   CREATININE mg/dL 0 66 0 57* 0 52*  < > 0 78 1 01   CALCIUM mg/dL 8 7 8 8 8 7  < > 8 7 9 3   TOTAL PROTEIN g/dL  --   --   --   --  5 6* 8 0   BILIRUBIN TOTAL mg/dL  --   --   --   --  0 50 0 60   ALK PHOS U/L  --   --   --   --  56 85   ALT U/L  --   --   --   --  15 19   AST U/L  --   --   --   --  27 21   GLUCOSE RANDOM mg/dL 131 160* 129  < > 127 127   < > = values in this interval not displayed  Results from last 7 days  Lab Units 05/02/18 0442 05/01/18 0227   MAGNESIUM mg/dL 1 7 1 7       Results from last 7 days  Lab Units 05/02/18 0442   PHOSPHORUS mg/dL 3 0        Results from last 7 days  Lab Units 05/05/18 0457 05/04/18 0541 05/03/18 2328  05/02/18  0442   INR   --   --   --   --  1 17*   PTT seconds 33 64* 62*  < > 32   < > = values in this interval not displayed      Results from last 7 days  Lab Units 05/02/18 0442   LACTIC ACID mmol/L 1 4       0  Lab Value Date/Time   TROPONINI 1 08 () 05/04/2018 0541   TROPONINI 2 66 () 05/02/2018 1249   TROPONINI 2 58 ([facility]) 05/02/2018 1011   TROPONINI 2 88 ([facility]) 05/02/2018 0442   TROPONINI <0 02 05/01/2018 0227   TROPONINI <0 02 03/06/2018 0031   TROPONINI <0 02 03/05/2018 2117   TROPONINI <0 02 11/10/2017 0536   TROPONINI <0 02 11/06/2017 2011   TROPONINI <0 02 11/06/2017 0520   TROPONINI <0 02 11/06/2017 0121   TROPONINI <0 02 11/05/2017 2130   TROPONINI <0 02 05/08/2017 2111   TROPONINI <0 02 05/08/2017 1838   TROPONINI <0 02 05/08/2017 1544   TROPONINI <0 02 10/06/2016 0201   TROPONINI <0 02 10/05/2016 2347   TROPONINI <0 02 10/05/2016 2049   TROPONINI <0 02 10/05/2016 0515   TROPONINI <0 02 10/05/2016 0057   TROPONINI <0 02 05/03/2016 2234       Microbiology:  Cdiff negative  Bld Cx NGTD  Sputum 1+ candida  FLU/RSV neg  Wound Cx 3+ MRDSA, 3+ Klebsiella    Imaging and other studies: I have personally reviewed pertinent reports  and I have personally reviewed pertinent films in PACS  5/4 CXR - right PICC in place, mild vascular congestion and blunted left CP angle, no dense infiltrates      Bossman Bravo DO, [facility] No

## 2022-07-15 NOTE — PROGRESS NOTES
Patient called back requesting Hydrocodone.   Progress Note - Karishma Fret 1945, 74 y o  male MRN: [id number]    Unit/Bed#: 424-01 Encounter: [id number]    Primary Care Provider: Avani Armas PA-C   Date and time admitted to hospital: 5/1/2018  1:16 AM        Acute on chronic respiratory failure with hypoxia and hypercapnia (HCC)   Assessment & Plan    Likely multifactorial secondary to sepsis as well as acute on chronic severe COPD  Significantly improved, maintaining acceptable oxygen saturation on NC, continue oxygen supplementation above 88%  Respiratory protocol  Consulted Pulmonology/Critical Care, input appreciated        * Septic shock (HCC)   Assessment & Plan    Fever 102, leukocytosis, tachycardia, tachypnea, lactic acidosis, hypotension unresponsive to fluids and requiring pressors   Resolved  S/p PICC line placement   BP maintaining off pressors since 5/3  Unclear source, suspected secondary to tracheobronchitis vs scrotal ulcer  Continue empiric treatment with cefepime and vancomycin  Procalcitonin trending down, continue to follow  ID consulted, input appreciated            COPD with acute exacerbation (HCC)   Assessment & Plan    Improved  Taper Solu-Medrol to 20 mg IV daily, can likely transition to prednisone tomorrow  Respiratory protocol  Continue bipap as tolerated  Pulmonology consulted, input appreciated        NSTEMI (non-ST elevated myocardial infarction) ([facility] [address] )   Assessment & Plan    Most likely type 2 secondary to hypotension  Troponin trended down   2D Echo reviewed, normal EF  Heparin discontinued  Cardiology input appreciated          Acute encephalopathy   Assessment & Plan    Most likely multifactorial secondary to acute respiratory failure and septic shock, resolved  Continue treatment of underlying conditions          Chronic diastolic congestive heart failure ([facility] [address] )   Assessment & Plan    In mild exacerbation which is improving, weight decreased  Due to increased in CO2 will hold off Lasix today to avoid contraction alkalosis, monitor BMP  Cardiology consulted  Monitor daily weights, intake and output          Scrotal ulcer   Assessment & Plan    Improving  Urology input appreciated, Urology evaluated and signed off  Wound cx noted for MRSA and Serratia marcescens  Continue with cefepime and vancomycin  Infectious Disease input appreciated, will continue to follow recommendations        Severe protein-calorie malnutrition ([facility] [address] )   Assessment & Plan    Malnutrition Findings:   Malnutrition type: Chronic illness  Degree of Malnutrition: Other severe protein calorie malnutrition   Multiple chronic conditions including COPD, CHF, tobacco abuse    BMI Findings:  BMI Classifications: Underweight < 18 5     Body mass index is 16 23 kg/m²  Nutrition consult  Resolved, completing most of the meals now        Tobacco abuse   Assessment & Plan     cessation  Offer nicotine patch        Acute tracheobronchitis   Assessment & Plan    POA  Possible cause of septic shock which is now resolved  Leukocytosis persist likely due to steroids, otherwise significant clinical improvement  Sputum cx noted for candida albicans unlikely to have caused clinical picture, no need to treat  Continue Cefepime/Vanco  Pulmonology on board            VTE Pharmacologic Prophylaxis:   Pharmacologic: Enoxaparin (Lovenox)  Mechanical VTE Prophylaxis in Place: Yes    Patient Centered Rounds: I have performed bedside rounds with nursing staff today  Discussions with Specialists or Other Care Team Provider: Multidisciplinary meeting    Education and Discussions with Family / Patient: Patient    Time Spent for Care: 30 minutes  More than 50% of total time spent on counseling and coordination of care as described above      Current Length of Stay: 6 day(s)    Current Patient Status: Inpatient   Certification Statement: The patient will continue to require additional inpatient hospital stay due to need for close monitoring, IV antibitocis    Discharge Plan: TBD    Code Status: Level 3 - DNAR and DNI      Subjective:   Patient seen and examined  He states that he feels better overall, improved breathing and cough  Denies scrotal pain  Has been afebrile  Good oral intake  No o/n events  Objective:     Vitals:   Temp (24hrs), Av 2 °F (36 8 °C), Min:97 9 °F (36 6 °C), Max:98 6 °F (37 °C)    HR:  [] 96  Resp:  [18] 18  BP: ()/(54-58) 97/58  SpO2:  [92 %-96 %] 94 %  Body mass index is 16 23 kg/m²  Input and Output Summary (last 24 hours): Intake/Output Summary (Last 24 hours) at 18 1135  Last data filed at 18 1802   Gross per 24 hour   Intake              600 ml   Output              750 ml   Net             -150 ml       Physical Exam:     Physical Exam   Constitutional: He is oriented to person, place, and time  He appears cachectic  No distress  HENT:   Head: Normocephalic and atraumatic  Eyes: Conjunctivae are normal    Neck: No JVD present  Cardiovascular: Regular rhythm  Pulmonary/Chest: Effort normal  No respiratory distress  He has no wheezes  He has rales  Abdominal: Soft  He exhibits no distension  There is no tenderness  There is no guarding  Musculoskeletal: He exhibits no edema  Neurological: He is alert and oriented to person, place, and time  Skin: Skin is warm and dry  Psychiatric: His speech is delayed  He is slowed  Additional Data:     Labs:      Results from last 7 days  Lab Units 18  0518  0227   WBC Thousand/uL 19 43*  < > 14 41*   HEMOGLOBIN g/dL 13 5  < > 15 9   HEMATOCRIT % 40 7  < > 48 6   PLATELETS Thousands/uL 231  < > 243   NEUTROS PCT %  --   --  86*   LYMPHS PCT %  --   --  4*   LYMPHO PCT % 4*  < >  --    MONOS PCT %  --   --  10   MONO PCT MAN % 12  < >  --    EOS PCT %  --   --  0   EOSINO PCT MANUAL % 0  < >  --    < > = values in this interval not displayed      Results from last 7 days  Lab Units 18  0526  18  4744 SODIUM mmol/L 140  < > 139   POTASSIUM mmol/L 4 3  < > 4 0   CHLORIDE mmol/L 100  < > 104   CO2 mmol/L 41*  < > 30   BUN mg/dL 31*  < > 23   CREATININE mg/dL 0 66  < > 0 78   CALCIUM mg/dL 8 7  < > 8 7   TOTAL PROTEIN g/dL  --   --  5 6*   BILIRUBIN TOTAL mg/dL  --   --  0 50   ALK PHOS U/L  --   --  56   ALT U/L  --   --  15   AST U/L  --   --  27   GLUCOSE RANDOM mg/dL 131  < > 127   < > = values in this interval not displayed  Results from last 7 days  Lab Units 05/02/18  0442   INR  1 17*                 * I Have Reviewed All Lab Data Listed Above  * Additional Pertinent Lab Tests Reviewed: [address] Admission Reviewed    Imaging:    Imaging Reports Reviewed Today Include: none today    Recent Cultures (last 7 days):       Results from last 7 days  Lab Units 05/04/18  0536 05/03/18  1638 05/01/18  1151 05/01/18  1103 05/01/18  0234 05/01/18  0227 05/01/18  0218   BLOOD CULTURE   --   --   --   --  No Growth After 5 Days  No Growth After 5 Days    --    SPUTUM CULTURE   --  1+ Growth of Candida sp  presumptively albicans*  --   --   --   --   --    GRAM STAIN RESULT   --  1+ Polys  2+ Budding yeast  --   --   --   --  1+ Polys  1+ Gram positive cocci in clusters   URINE CULTURE   --   --  No Growth <1000 cfu/mL  --   --   --   --    WOUND CULTURE   --   --   --   --   --   --  3+ Growth of Methicillin Resistant Staphylococcus aureus*  3+ Growth of Klebsiella pneumoniae*  3+ Growth of Serratia marcescens*   INFLUENZA A PCR   --   --   --  None Detected  --   --   --    INFLUENZA B PCR   --   --   --  None Detected  --   --   --    RSV PCR   --   --   --  None Detected  --   --   --    C DIFF TOXIN B  NEGATIVE for C difficle toxin by PCR    --   --   --   --   --   --        Last 24 Hours Medication List:     Current Facility-Administered Medications:  acetaminophen 650 mg Rectal Q6H PRN Val Voss MD    aspirin 325 mg Oral Daily Saige Haynes MD    cefepime 2,000 mg Intravenous Q12H Lorenza Alonso MD Last Rate: 2,000 mg (05/07/18 1048)   enoxaparin 40 mg Subcutaneous Q24H [facility] Christiana Schroeder MD    fluticasone-salmeterol 1 puff Inhalation Q12H [facility] Christiana Schroeder MD    ipratropium 0 5 mg Nebulization TID Lorenza Alonso MD    levalbuterol 1 25 mg Nebulization TID Lorenza Alonso MD    levalbuterol 1 25 mg Nebulization Q6H PRN Christiana Schroeder MD    LORazepam 0 5 mg Intravenous Q4H PRN Lorenza Alonso MD    methylPREDNISolone sodium succinate 20 mg Intravenous Daily Christiana Schroeder MD    morphine injection 2 mg Intravenous Q3H PRN Tani Raymundo MD    nicotine 21 mg Transdermal Daily Christiana Schroeder MD    nitroglycerin 0 4 mg Sublingual Q5 Min PRN Tani Raymundo MD    sodium chloride 3 mL Nebulization Q6H PRN Lorenza Alonso MD    vancomycin 1,000 mg Intravenous Q12H Lorenza Alonso MD Last Rate: 1,000 mg (05/07/18 0401)        Today, Patient Was Seen By: Shivani Ruth MD    ** Please Note: Dictation voice to text software may have been used in the creation of this document   **

## 2023-01-01 NOTE — SEPSIS NOTE
Sepsis Note   Penny Garza 74 y o  male MRN: [id number]  Unit/Bed#:  Encounter: [id number]            Initial Sepsis Screening     Row Name 05/01/18 1130                Is the patient's history suggestive of a new or worsening infection?         Suspected source of infection          Are two or more of the following signs & symptoms of infection both present and new to the patient?         Indicate SIRS criteria          If the answer is yes to both questions, suspicion of sepsis is present          If severe sepsis is present AND tissue hypoperfusion perists in the hour after fluid resuscitation or lactate > 4, the patient meets criteria for SEPTIC SHOCK          Are any of the following organ dysfunction criteria present within 6 hours of suspected infection and SIRS criteria that are NOT considered to be chronic conditions?         Organ dysfunction SBP < 90 mmHg; Lactate > 2 0 mmol/L;Acute respiratory failure (new need for invasive or non-invasive mechanical ventilation)  -PL        Date of presentation of severe sepsis 05/01/18  -PL        Time of presentation of severe sepsis 1130  -PL        Tissue hypoperfusion persists in the hour after crystalloid fluid administration, evidenced, by either: SBP < 90 mm/Hg ( ___ mm/Hg in comment field)  -PL        Was hypotension present within one hour of the conclusion of crystalloid fluid administration?  Yes  -PL        Date of presentation of septic shock          Time of presentation of septic shock            User Key  (r) = Recorded By, (t) = Taken By, (c) = Cosigned By    [address] Name Provider Babar Smith MD Physician Statement Selected

## 2025-01-13 NOTE — NUTRITION
11/06/17 1205   Assessment   Timepoint Initial  (consult: BMI < 19; FM wasting, speech)   Labs   List Completed Labs (alb 2 3; meds- lipitor, solumedrol)   Feeding Route   Swallow (speech consulted, no eval at this time, will monitor)   Adequacy of Intake   Nutrition Modality PO  (cardiac TLC 2 3g Na)   Estimated calorie intake compared to estimated need no intakes recorded at this time, predict pt to have fair intake during admission  will continue to monitor   Nutrition Prognosis   Potential Fair   Comorbid Concerns (potential malnutrition, BMI < 19, sepsis, hx stroke, COPD, )   Nutrition Considerations (will assess needs for education when able to speak with pt)   PES Statement   Problem Intake   Energy Balance (1) Predicted suboptimal energy intake NI-1 4   Related to Decreased Appetite; Inadequate diet   As evidenced by: BMI   Patient Nutrition Goals   Goal avoid weight loss;meet PO needs   Goal Status initiated   Timeframe to complete goal by next f/u   Recommendations/Interventions   Summary pt triggered for BMI < 18 and FM wasting  will refrain from filing BMI/malnutrition form at this time as unable to speak with pt, and insufficient data is present to quantify malnutrition, however it is most likely present  will adjust diet order to cardiac step 1 to liberalize menu and hopefully promote adequate intake  will review pt again tomorrow to interview and assess malnutrition status      Interventions Diet: order adjustment   Nutrition Recommendations Other (specify)  (adjust diet to cardiac step 1)   Nutrition Complexity Risk   Nutrition complexity level High risk   Nutrition review: 11/07/17  (interview as able, assess for malnuitrition)   Follow up date 11/10/17 Detail Level: Detailed Detail Level: Generalized Detail Level: Zone